# Patient Record
Sex: FEMALE | Race: WHITE | NOT HISPANIC OR LATINO | Employment: OTHER | URBAN - METROPOLITAN AREA
[De-identification: names, ages, dates, MRNs, and addresses within clinical notes are randomized per-mention and may not be internally consistent; named-entity substitution may affect disease eponyms.]

---

## 2018-04-21 ENCOUNTER — OFFICE VISIT (OUTPATIENT)
Dept: URGENT CARE | Facility: URGENT CARE | Age: 68
End: 2018-04-21
Payer: COMMERCIAL

## 2018-04-21 VITALS
TEMPERATURE: 97.7 F | BODY MASS INDEX: 34.95 KG/M2 | OXYGEN SATURATION: 96 % | DIASTOLIC BLOOD PRESSURE: 82 MMHG | WEIGHT: 210 LBS | SYSTOLIC BLOOD PRESSURE: 144 MMHG | HEART RATE: 69 BPM

## 2018-04-21 DIAGNOSIS — L03.314 CELLULITIS OF GROIN: Primary | ICD-10-CM

## 2018-04-21 DIAGNOSIS — N76.4 VULVAR ABSCESS: ICD-10-CM

## 2018-04-21 PROCEDURE — 56405 I&D VULVA/PERINEAL ABSCESS: CPT | Performed by: FAMILY MEDICINE

## 2018-04-21 PROCEDURE — 99202 OFFICE O/P NEW SF 15 MIN: CPT | Mod: 25 | Performed by: FAMILY MEDICINE

## 2018-04-21 RX ORDER — SULFAMETHOXAZOLE/TRIMETHOPRIM 800-160 MG
1 TABLET ORAL 2 TIMES DAILY
Qty: 20 TABLET | Refills: 0 | Status: SHIPPED | OUTPATIENT
Start: 2018-04-21 | End: 2021-02-12

## 2018-04-21 NOTE — PROGRESS NOTES
SUBJECTIVE:   Ely Pollack is a 68 year old female presenting with a chief complaint of   Chief Complaint   Patient presents with     Urgent Care     Derm Problem     x 3 days and has getting bigger.        She is a new patient of [facility].    Fam hx is negative for cardiovascular disease, but positive for arthritis    Review of Systems   Skin:        Complains of left vulvar swelling and erythema.  Symptoms over the last week increasing in discomfort and signs   All other systems reviewed and are negative.      Past Medical History:   Diagnosis Date     Arthritis      Hypertension     Cardiology No     Other chronic pain     Pain for 10 years     PONV (postoperative nausea and vomiting)      No family history on file.  Current Outpatient Prescriptions   Medication Sig Dispense Refill     sulfamethoxazole-trimethoprim (BACTRIM DS/SEPTRA DS) 800-160 MG per tablet Take 1 tablet by mouth 2 times daily 20 tablet 0     acetaminophen 650 MG 8 hour tablet Take 650 mg by mouth every 4 hours as needed for other (surgical pain) 100 tablet 0     aspirin  MG tablet Take 1 tablet (325 mg) by mouth daily with food 35 tablet 0     ATENOLOL PO Take 50 mg by mouth daily       CITALOPRAM HYDROBROMIDE PO Take 40 mg by mouth daily       enoxaparin (LOVENOX) 40 MG/0.4ML syringe Inject 0.4 mLs (40 mg) Subcutaneous every 24 hours 12 Syringe 0     hydrochlorothiazide 12.5 MG TABS Take 25 mg by mouth daily       IBUPROFEN PO Take 800 mg by mouth every 6 hours as needed for moderate pain       loratadine (CLARITIN) 10 MG tablet Take 10 mg by mouth daily       multivitamin, therapeutic with minerals (THERA-VIT-M) TABS Take 1 tablet by mouth daily       ondansetron (ZOFRAN-ODT) 4 MG disintegrating tablet Take 1 tablet (4 mg) by mouth every 6 hours as needed for nausea 14 tablet 0     oxyCODONE (ROXICODONE) 5 MG immediate release tablet Take 1-2 tablets (5-10 mg) by mouth every 3 hours as needed for moderate to severe pain 90 tablet 0      potassium chloride jeevan er (K-DUR) Take 10 mEq by mouth daily       senna-docusate (SENOKOT-S;PERICOLACE) 8.6-50 MG per tablet Take 1-2 tablets by mouth 2 times daily 30 tablet 0     Social History   Substance Use Topics     Smoking status: Never Smoker     Smokeless tobacco: Never Used     Alcohol use Yes      Comment: 3 drinks weekly       OBJECTIVE  /82 (BP Location: Right arm, Patient Position: Chair, Cuff Size: Adult Large)  Pulse 69  Temp 97.7  F (36.5  C) (Oral)  Wt 210 lb (95.3 kg)  SpO2 96%  Breastfeeding? No  BMI 34.95 kg/m2    Physical Exam   Constitutional: She appears well-developed.   HENT:   Head: Normocephalic.   Eyes: EOM are normal. Pupils are equal, round, and reactive to light.   Neck: Normal range of motion.   Cardiovascular: Normal rate and regular rhythm.    Pulmonary/Chest: Effort normal.   Abdominal: Soft.   Genitourinary:   Genitourinary Comments: External genitalia appears to be abnormal swelling along the external labia on the left.  There is significant tenderness and swelling swelling that encompasses the upper to the lower labia.    More towards the inner aspect of the external labia there appears to be a gland is not draining.   Musculoskeletal: Normal range of motion.   Neurological: She is alert.   Skin: She is not diaphoretic.       Labs:  No results found for this or any previous visit (from the past 24 hour(s)).    X-Ray was not done.    ASSESSMENT:      ICD-10-CM    1. Cellulitis of groin L03.314 sulfamethoxazole-trimethoprim (BACTRIM DS/SEPTRA DS) 800-160 MG per tablet   2. Vulvar abscess N76.4 sulfamethoxazole-trimethoprim (BACTRIM DS/SEPTRA DS) 800-160 MG per tablet    3. Erythema    She is taking the procedure room there she was placed in lithotomy position and the area was cleaned prepped and draped.    It was anesthetized with lidocaine without epinephrine.  We then entered the area using a #11 blade and were able to move significant amount of pustular  material.    No packing was placed in the wound              Medical Decision Making:    Differential Diagnosis:  Bartholin gland infection, cellulitis, vulvar infection.    Serious Comorbid Conditions:  Adult:  None    PLAN:    1. Antibiotics  2. Sitz baths  3. Call in am with update.    Followup:    She comes in because of the infection and the fact that she will be going on a trip within the short period of time.    I would have this rechecked in the next few days.    There are no Patient Instructions on file for this visit.

## 2018-04-21 NOTE — NURSING NOTE
"Chief Complaint   Patient presents with     Urgent Care     Derm Problem     x 3 days and has getting bigger.        Initial /82 (BP Location: Right arm, Patient Position: Chair, Cuff Size: Adult Large)  Pulse 69  Temp 97.7  F (36.5  C) (Oral)  Wt 210 lb (95.3 kg)  SpO2 96%  Breastfeeding? No  BMI 34.95 kg/m2 Estimated body mass index is 34.95 kg/(m^2) as calculated from the following:    Height as of 3/29/16: 5' 5\" (1.651 m).    Weight as of this encounter: 210 lb (95.3 kg).  Medication Reconciliation: complete     Deepika Grissom MA   "

## 2018-04-21 NOTE — MR AVS SNAPSHOT
"              After Visit Summary   2018    Ely Pollack    MRN: [id number]           Patient Information     Date Of Birth          1950        Visit Information        Provider Department      2018 11:25 AM Simon Shaw MD [facility]        Today's Diagnoses     Cellulitis of groin    -  1    Vulvar abscess           Follow-ups after your visit        Who to contact     If you have questions or need follow up information about today's clinic visit or your schedule please contact [facility] directly at [phone].  Normal or non-critical lab and imaging results will be communicated to you by Definienshart, letter or phone within 4 business days after the clinic has received the results. If you do not hear from us within 7 days, please contact the clinic through BladeLogict or phone. If you have a critical or abnormal lab result, we will notify you by phone as soon as possible.  Submit refill requests through [email or web address] or call your pharmacy and they will forward the refill request to us. Please allow 3 business days for your refill to be completed.          Additional Information About Your Visit        MyChart Information     [email or web address] lets you send messages to your doctor, view your test results, renew your prescriptions, schedule appointments and more. To sign up, go to [email or web address] . Click on \"Log in\" on the left side of the screen, which will take you to the Welcome page. Then click on \"Sign up Now\" on the right side of the page.     You will be asked to enter the access code listed below, as well as some personal information. Please follow the directions to create your username and password.     Your access code is: [email or web address]  Expires: 2018  3:02 PM     Your access code will  in 90 days. If you need help or a new code, please call your [facility] clinic or [phone].        Care EveryWhere ID     This is your Care EveryWhere ID. This " could be used by other organizations to access your [facility] medical records  [id number]        Your Vitals Were     Pulse Temperature Pulse Oximetry Breastfeeding? BMI (Body Mass Index)       69 97.7  F (36.5  C) (Oral) 96% No 34.95 kg/m2        Blood Pressure from Last 3 Encounters:   04/21/18 144/82   04/01/16 101/61    Weight from Last 3 Encounters:   04/21/18 210 lb (95.3 kg)   03/29/16 201 lb (91.2 kg)              We Performed the Following     DRAIN SKIN ABSCESS SIMPLE/SINGLE          Today's Medication Changes          These changes are accurate as of 4/21/18 11:59 PM.  If you have any questions, ask your nurse or doctor.               Start taking these medicines.        Dose/Directions    sulfamethoxazole-trimethoprim 800-160 MG per tablet   Commonly known as:  BACTRIM DS/SEPTRA DS   Used for:  Cellulitis of groin, Vulvar abscess   Started by:  Simon Shaw MD        Dose:  1 tablet   Take 1 tablet by mouth 2 times daily   Quantity:  20 tablet   Refills:  0            Where to get your medicines      These medications were sent to [id number] - [place], [place] - [address] [address]   [address] [facility] [address]     Phone:  [phone]     sulfamethoxazole-trimethoprim 800-160 MG per tablet                Primary Care Provider Office Phone # Fax #    Rakesh Ortiz [phone]       [facility] [id number] [place] [id number]        Equal Access to Services     RONALD GANDARA AH: [facility], adiel ch, qaybta kaalradha michaels alan rodriguez [facility] vaughn. So [facility] [phone].    ATENCIÓN: Si habla español, tiene a zaidi disposición servicios gratuitos de asistencia lingüística. Llame al [phone].    We comply with applicable federal civil rights laws and [place] laws. We do not discriminate on the basis of race, color, national origin, age, disability, sex, sexual orientation, or gender  identity.            Thank you!     Thank you for choosing [facility]  for your care. Our goal is always to provide you with excellent care. Hearing back from our patients is one way we can continue to improve our services. Please take a few minutes to complete the written survey that you may receive in the mail after your visit with us. Thank you!             Your Updated Medication List - Protect others around you: Learn how to safely use, store and throw away your medicines at www.disposemymeds.org.          This list is accurate as of 4/21/18 11:59 PM.  Always use your most recent med list.                   Brand Name Dispense Instructions for use Diagnosis    acetaminophen 650 MG 8 hour tablet     100 tablet    Take 650 mg by mouth every 4 hours as needed for other (surgical pain)    History of bilateral knee arthroplasty       aspirin 325 MG EC tablet     35 tablet    Take 1 tablet (325 mg) by mouth daily with food    History of bilateral knee arthroplasty       ATENOLOL PO      Take 50 mg by mouth daily        CITALOPRAM HYDROBROMIDE PO      Take 40 mg by mouth daily        enoxaparin 40 MG/0.4ML injection    LOVENOX    12 Syringe    Inject 0.4 mLs (40 mg) Subcutaneous every 24 hours    History of bilateral knee arthroplasty       hydrochlorothiazide 12.5 MG Tabs tablet      Take 25 mg by mouth daily        IBUPROFEN PO      Take 800 mg by mouth every 6 hours as needed for moderate pain        loratadine 10 MG tablet    CLARITIN     Take 10 mg by mouth daily        multivitamin, therapeutic with minerals Tabs tablet      Take 1 tablet by mouth daily        ondansetron 4 MG ODT tab    ZOFRAN-ODT    14 tablet    Take 1 tablet (4 mg) by mouth every 6 hours as needed for nausea    History of bilateral knee arthroplasty       oxyCODONE IR 5 MG tablet    ROXICODONE    90 tablet    Take 1-2 tablets (5-10 mg) by mouth every 3 hours as needed for moderate to severe pain    History of bilateral  knee arthroplasty       potassium chloride jeevan er    K-DUR     Take 10 mEq by mouth daily        senna-docusate 8.6-50 MG per tablet    SENOKOT-S;PERICOLACE    30 tablet    Take 1-2 tablets by mouth 2 times daily    History of bilateral knee arthroplasty       sulfamethoxazole-trimethoprim 800-160 MG per tablet    BACTRIM DS/SEPTRA DS    20 tablet    Take 1 tablet by mouth 2 times daily    Cellulitis of groin, Vulvar abscess

## 2019-12-06 ENCOUNTER — HOSPITAL ENCOUNTER (OUTPATIENT)
Dept: MAMMOGRAPHY | Facility: CLINIC | Age: 69
Discharge: HOME | End: 2019-12-06
Attending: FAMILY MEDICINE | Admitting: FAMILY MEDICINE
Payer: COMMERCIAL

## 2019-12-06 DIAGNOSIS — Z12.31 VISIT FOR SCREENING MAMMOGRAM: ICD-10-CM

## 2019-12-06 PROCEDURE — 77063 BREAST TOMOSYNTHESIS BI: CPT

## 2021-01-25 ENCOUNTER — TRANSFERRED RECORDS (OUTPATIENT)
Dept: FAMILY MEDICINE | Facility: CLINIC | Age: 71
End: 2021-01-25

## 2021-02-12 ENCOUNTER — OFFICE VISIT (OUTPATIENT)
Dept: FAMILY MEDICINE | Facility: CLINIC | Age: 71
End: 2021-02-12

## 2021-02-12 VITALS
HEIGHT: 66 IN | SYSTOLIC BLOOD PRESSURE: 136 MMHG | DIASTOLIC BLOOD PRESSURE: 72 MMHG | BODY MASS INDEX: 32.98 KG/M2 | HEART RATE: 64 BPM | WEIGHT: 205.2 LBS | RESPIRATION RATE: 20 BRPM | TEMPERATURE: 97.8 F

## 2021-02-12 DIAGNOSIS — Z12.11 SCREEN FOR COLON CANCER: ICD-10-CM

## 2021-02-12 DIAGNOSIS — M54.2 CHRONIC NECK PAIN: ICD-10-CM

## 2021-02-12 DIAGNOSIS — Z91.030 BEE STING ALLERGY: ICD-10-CM

## 2021-02-12 DIAGNOSIS — Z00.00 ROUTINE GENERAL MEDICAL EXAMINATION AT A HEALTH CARE FACILITY: Primary | ICD-10-CM

## 2021-02-12 DIAGNOSIS — R25.2 LEG CRAMPS: ICD-10-CM

## 2021-02-12 DIAGNOSIS — Z78.0 ASYMPTOMATIC POSTMENOPAUSAL STATUS: ICD-10-CM

## 2021-02-12 DIAGNOSIS — I10 ESSENTIAL HYPERTENSION: ICD-10-CM

## 2021-02-12 DIAGNOSIS — G89.29 CHRONIC BACK PAIN, UNSPECIFIED BACK LOCATION, UNSPECIFIED BACK PAIN LATERALITY: ICD-10-CM

## 2021-02-12 DIAGNOSIS — G89.29 CHRONIC NECK PAIN: ICD-10-CM

## 2021-02-12 DIAGNOSIS — F32.89 OTHER DEPRESSION: ICD-10-CM

## 2021-02-12 DIAGNOSIS — M54.9 CHRONIC BACK PAIN, UNSPECIFIED BACK LOCATION, UNSPECIFIED BACK PAIN LATERALITY: ICD-10-CM

## 2021-02-12 DIAGNOSIS — F41.9 ANXIETY: ICD-10-CM

## 2021-02-12 DIAGNOSIS — Z11.59 ENCOUNTER FOR HEPATITIS C SCREENING TEST FOR LOW RISK PATIENT: ICD-10-CM

## 2021-02-12 DIAGNOSIS — Z12.31 VISIT FOR SCREENING MAMMOGRAM: ICD-10-CM

## 2021-02-12 LAB
BUN SERPL-MCNC: 16 MG/DL (ref 7–25)
BUN/CREATININE RATIO: 21.3 (ref 6–22)
CALCIUM SERPL-MCNC: 9.8 MG/DL (ref 8.6–10.3)
CHLORIDE SERPLBLD-SCNC: 96.7 MMOL/L (ref 98–110)
CHOLEST SERPL-MCNC: 180 MG/DL (ref 0–199)
CHOLEST/HDLC SERPL: 3 {RATIO} (ref 0–5)
CO2 SERPL-SCNC: 31.8 MMOL/L (ref 20–32)
CREAT SERPL-MCNC: 0.75 MG/DL (ref 0.6–1.3)
GLUCOSE SERPL-MCNC: 108 MG/DL (ref 60–99)
HDLC SERPL-MCNC: 61 MG/DL (ref 40–150)
LDLC SERPL CALC-MCNC: 94 MG/DL (ref 0–130)
POTASSIUM SERPL-SCNC: 4.74 MMOL/L (ref 3.5–5.3)
SODIUM SERPL-SCNC: 135.9 MMOL/L (ref 135–146)
TRIGL SERPL-MCNC: 125 MG/DL (ref 0–149)

## 2021-02-12 PROCEDURE — 99387 INIT PM E/M NEW PAT 65+ YRS: CPT | Performed by: FAMILY MEDICINE

## 2021-02-12 PROCEDURE — 80061 LIPID PANEL: CPT | Performed by: FAMILY MEDICINE

## 2021-02-12 PROCEDURE — 36415 COLL VENOUS BLD VENIPUNCTURE: CPT | Performed by: FAMILY MEDICINE

## 2021-02-12 PROCEDURE — 80048 BASIC METABOLIC PNL TOTAL CA: CPT | Performed by: FAMILY MEDICINE

## 2021-02-12 RX ORDER — BUSPIRONE HYDROCHLORIDE 15 MG/1
7.5 TABLET ORAL 2 TIMES DAILY
Qty: 90 TABLET | Refills: 1 | Status: SHIPPED | OUTPATIENT
Start: 2021-02-12 | End: 2021-10-13

## 2021-02-12 RX ORDER — ATENOLOL 50 MG/1
50 TABLET ORAL DAILY
Qty: 90 TABLET | Refills: 1 | Status: SHIPPED | OUTPATIENT
Start: 2021-02-12 | End: 2021-10-13

## 2021-02-12 RX ORDER — BUSPIRONE HYDROCHLORIDE 15 MG/1
0.5 TABLET ORAL 2 TIMES DAILY
COMMUNITY
Start: 2021-01-27 | End: 2021-02-12

## 2021-02-12 RX ORDER — POTASSIUM CHLORIDE 1500 MG/1
20 TABLET, EXTENDED RELEASE ORAL DAILY
COMMUNITY
Start: 2020-12-06 | End: 2021-02-12

## 2021-02-12 RX ORDER — CITALOPRAM HYDROBROMIDE 40 MG/1
40 TABLET ORAL DAILY
COMMUNITY
Start: 2020-11-23 | End: 2021-02-12

## 2021-02-12 RX ORDER — CITALOPRAM HYDROBROMIDE 40 MG/1
40 TABLET ORAL DAILY
Qty: 90 TABLET | Refills: 1 | Status: SHIPPED | OUTPATIENT
Start: 2021-02-12 | End: 2021-10-13

## 2021-02-12 RX ORDER — EPINEPHRINE 0.3 MG/.3ML
3 INJECTION SUBCUTANEOUS
COMMUNITY
Start: 2021-02-05 | End: 2021-02-12

## 2021-02-12 RX ORDER — HYDROCHLOROTHIAZIDE 25 MG/1
25 TABLET ORAL DAILY
COMMUNITY
Start: 2021-01-27 | End: 2021-02-12

## 2021-02-12 RX ORDER — POTASSIUM CHLORIDE 1500 MG/1
20 TABLET, EXTENDED RELEASE ORAL DAILY
Qty: 90 TABLET | Refills: 1 | Status: SHIPPED | OUTPATIENT
Start: 2021-02-12 | End: 2021-10-13

## 2021-02-12 RX ORDER — HYDROCHLOROTHIAZIDE 25 MG/1
25 TABLET ORAL DAILY
Qty: 90 TABLET | Refills: 1 | Status: SHIPPED | OUTPATIENT
Start: 2021-02-12 | End: 2021-10-13

## 2021-02-12 RX ORDER — ATENOLOL 50 MG/1
50 TABLET ORAL DAILY
COMMUNITY
Start: 2019-09-24 | End: 2021-02-12

## 2021-02-12 RX ORDER — EPINEPHRINE 0.3 MG/.3ML
0.3 INJECTION SUBCUTANEOUS PRN
COMMUNITY
Start: 2021-02-12 | End: 2024-04-30

## 2021-02-12 SDOH — HEALTH STABILITY: MENTAL HEALTH: HOW OFTEN DO YOU HAVE A DRINK CONTAINING ALCOHOL?: 2-3 TIMES A WEEK

## 2021-02-12 SDOH — HEALTH STABILITY: MENTAL HEALTH: HOW MANY STANDARD DRINKS CONTAINING ALCOHOL DO YOU HAVE ON A TYPICAL DAY?: 1 OR 2

## 2021-02-12 SDOH — HEALTH STABILITY: MENTAL HEALTH: HOW OFTEN DO YOU HAVE 6 OR MORE DRINKS ON ONE OCCASION?: NEVER

## 2021-02-12 ASSESSMENT — ANXIETY QUESTIONNAIRES
6. BECOMING EASILY ANNOYED OR IRRITABLE: SEVERAL DAYS
7. FEELING AFRAID AS IF SOMETHING AWFUL MIGHT HAPPEN: NOT AT ALL
5. BEING SO RESTLESS THAT IT IS HARD TO SIT STILL: NOT AT ALL
1. FEELING NERVOUS, ANXIOUS, OR ON EDGE: SEVERAL DAYS
3. WORRYING TOO MUCH ABOUT DIFFERENT THINGS: SEVERAL DAYS
GAD7 TOTAL SCORE: 3
2. NOT BEING ABLE TO STOP OR CONTROL WORRYING: NOT AT ALL
IF YOU CHECKED OFF ANY PROBLEMS ON THIS QUESTIONNAIRE, HOW DIFFICULT HAVE THESE PROBLEMS MADE IT FOR YOU TO DO YOUR WORK, TAKE CARE OF THINGS AT HOME, OR GET ALONG WITH OTHER PEOPLE: NOT DIFFICULT AT ALL

## 2021-02-12 ASSESSMENT — PATIENT HEALTH QUESTIONNAIRE - PHQ9
SUM OF ALL RESPONSES TO PHQ QUESTIONS 1-9: 2
5. POOR APPETITE OR OVEREATING: NOT AT ALL

## 2021-02-12 ASSESSMENT — MIFFLIN-ST. JEOR: SCORE: 1469.59

## 2021-02-12 NOTE — PATIENT INSTRUCTIONS
Preventive Health Recommendations    See your health care provider every year to    Review health changes.     Discuss preventive care.      Review your medicines if your doctor has prescribed any.      You no longer need a yearly Pap test unless you've had an abnormal Pap test in the past 10 years. If you have vaginal symptoms, such as bleeding or discharge, be sure to talk with your provider about a Pap test.      Every 1 to 2 years, have a mammogram.  If you are over 69, talk with your health care provider about whether or not you want to continue having screening mammograms.      Every 10 years, have a colonoscopy. Or, have a yearly FIT test (stool test). These exams will check for colon cancer.       Have a cholesterol test every 5 years, or more often if your doctor advises it.       Have a diabetes test (fasting glucose) every three years. If you are at risk for diabetes, you should have this test more often.       At age 65, have a bone density scan (DEXA) to check for osteoporosis (brittle bone disease).    Shots:    Get a flu shot each year.    Get a tetanus shot every 10 years.    Talk to your doctor about your pneumonia vaccines. There are now two you should receive - Pneumovax (PPSV 23) and Prevnar (PCV 13).    Talk to your pharmacist about the shingles vaccine.    Talk to your doctor about the hepatitis B vaccine.    Nutrition:     Eat at least 5 servings of fruits and vegetables each day.      Eat whole-grain bread, whole-wheat pasta and brown rice instead of white grains and rice.      Get adequate about Calcium and Vitamin D.     Lifestyle    Exercise at least 150 minutes a week (30 minutes a day, 5 days a week). This will help you control your weight and prevent disease.      Limit alcohol to one drink per day.      No smoking.       Wear sunscreen to prevent skin cancer.       See your dentist twice a year for an exam and cleaning.      See your eye doctor every 1 to 2 years to screen for  conditions such as glaucoma, macular degeneration, cataracts, etc.    Personalized Prevention Plan  You are due for the preventive services outlined below.  Your care team is available to assist you in scheduling these services.  If you have already completed any of these items, please share that information with your care team to update in your medical record.    Health Maintenance Due   Topic Date Due     Osteoporosis Screening  1950     Discuss Advance Care Planning  1950     Colorectal Cancer Screening  04/13/1962     Hepatitis C Screening  04/13/1970     Diptheria Tetanus Pertussis (DTAP/TDAP/TD) Vaccine (1 - Tdap) 04/13/1977     Cholesterol Lab  04/13/1997     Annual Wellness Visit  04/13/2017     FALL RISK ASSESSMENT  04/13/2017     Pneumococcal Vaccine (1 of 1 - PPSV23) 04/13/2017     PHQ-2  01/01/2021     ASSESSMENT/PLAN:   1. Routine general medical examination at a health care facility    - Lipid Panel (BFP)    2. Essential hypertension  Blood pressure is controlled, continue medications. Refilled. checklabs.  - VENOUS COLLECTION  - Basic Metabolic Panel (BFP)  - atenolol (TENORMIN) 50 MG tablet; Take 1 tablet (50 mg) by mouth daily  Dispense: 90 tablet; Refill: 1  - hydrochlorothiazide (HYDRODIURIL) 25 MG tablet; Take 1 tablet (25 mg) by mouth daily  Dispense: 90 tablet; Refill: 1  - KLOR-CON 20 MEQ CR tablet; Take 1 tablet (20 mEq) by mouth daily  Dispense: 90 tablet; Refill: 1    3. Chronic back pain, unspecified back location, unspecified back pain laterality  Sees ortho.    4. Bee sting allergy  epipen.    5. Other depression  Controlled on medications, refilled.  - busPIRone (BUSPAR) 15 MG tablet; Take 0.5 tablets (7.5 mg) by mouth 2 times daily  Dispense: 90 tablet; Refill: 1  - citalopram (CELEXA) 40 MG tablet; Take 1 tablet (40 mg) by mouth daily  Dispense: 90 tablet; Refill: 1    6. Anxiety  Controlled on medications, refilled.  - busPIRone (BUSPAR) 15 MG tablet; Take 0.5 tablets  "(7.5 mg) by mouth 2 times daily  Dispense: 90 tablet; Refill: 1  - citalopram (CELEXA) 40 MG tablet; Take 1 tablet (40 mg) by mouth daily  Dispense: 90 tablet; Refill: 1    7. Leg cramps  Taking mg. Check labs.  - Magnesium (QUEST)    8. Chronic neck pain  Seeing ortho.    9. Screen for colon cancer    - COLOGNEVIN(EXACT SCIENCES)    Patient has been advised of split billing requirements and indicates understanding: Yes  COUNSELING:   Reviewed preventive health counseling, as reflected in patient instructions       Regular exercise       Healthy diet/nutrition    Estimated body mass index is 33.63 kg/m  as calculated from the following:    Height as of this encounter: 1.664 m (5' 5.5\").    Weight as of this encounter: 93.1 kg (205 lb 3.2 oz).    Weight management plan: Discussed healthy diet and exercise guidelines    She reports that she has never smoked. She has never used smokeless tobacco.    Recommended tetanus shot.  dexa scan.      Noemi Kirkpatrick MD  [facility]    "

## 2021-02-12 NOTE — PROGRESS NOTES
SUBJECTIVE:   CC: Ely Pollack is an 70 year old woman who presents for preventive health visit.       Patient has been advised of split billing requirements and indicates understanding: Yes  Healthy Habits:    Do you get at least three servings of calcium containing foods daily (dairy, green leafy vegetables, etc.)? yes    Amount of exercise or daily activities, outside of work: water aerobics, walking, bike    Problems taking medications regularly No    Medication side effects: No    Have you had an eye exam in the past two years? No, will go in soon    Do you see a dentist twice per year? yes    Do you have sleep apnea, excessive snoring or daytime drowsiness?no  Today's PHQ-2 Score:   PHQ-2 ( 1999 [employer]) 2/12/2021   Q1: Little interest or pleasure in doing things 0   Q2: Feeling down, depressed or hopeless 0   PHQ-2 Score 0       Do you feel safe in your environment? Yes    Have you ever done Advance Care Planning? (For example, a Health Directive, POLST, or a discussion with a medical provider or your loved ones about your wishes): Yes, patient states has an Advance Care Planning document and will bring a copy to the clinic.    Social History     Tobacco Use     Smoking status: Never Smoker     Smokeless tobacco: Never Used   Substance Use Topics     Alcohol use: Yes     Frequency: 2-3 times a week     Drinks per session: 1 or 2     Binge frequency: Never     Comment: 3 drinks weekly     If you drink alcohol do you typically have >3 drinks per day or >7 drinks per week? No  1 glass wine 5x/week                   Reviewed orders with patient.  Reviewed health maintenance and updated orders accordingly - Yes  BP Readings from Last 3 Encounters:   02/12/21 136/72   04/21/18 144/82   04/01/16 101/61    Wt Readings from Last 3 Encounters:   02/12/21 93.1 kg (205 lb 3.2 oz)   04/21/18 95.3 kg (210 lb)   03/29/16 91.2 kg (201 lb)                  Patient Active Problem List   Diagnosis     History of bilateral  knee arthroplasty     Essential hypertension     Anxiety     Other depression     Chronic back pain, unspecified back location, unspecified back pain laterality     Bee sting allergy     Leg cramps     Chronic neck pain     Past Surgical History:   Procedure Laterality Date     ARTHROPLASTY KNEE BILATERAL Bilateral 3/29/2016    Procedure: ARTHROPLASTY KNEE BILATERAL;  Surgeon: Darryn Cortez MD;  Location: [facility]     AS DECOMPRESS SPINAL CORD,1 SEG      Thorasic     AS SPINAL FUSION,ANT,EA ADNL LEVEL      Thorasic     BUNIONECTOMY Left        Social History     Tobacco Use     Smoking status: Never Smoker     Smokeless tobacco: Never Used   Substance Use Topics     Alcohol use: Yes     Frequency: 2-3 times a week     Drinks per session: 1 or 2     Binge frequency: Never     Comment: 3 drinks weekly     No family history on file.      Current Outpatient Medications   Medication Sig Dispense Refill     Albuterol Sulfate (VENTOLIN HFA IN) Inhale 2 puffs into the lungs every 4 hours as needed       aspirin (ASA) 81 MG chewable tablet 2 tablets daily       atenolol (TENORMIN) 50 MG tablet Take 1 tablet (50 mg) by mouth daily 90 tablet 1     busPIRone (BUSPAR) 15 MG tablet Take 0.5 tablets (7.5 mg) by mouth 2 times daily 90 tablet 1     citalopram (CELEXA) 40 MG tablet Take 1 tablet (40 mg) by mouth daily 90 tablet 1     EPINEPHrine (ANY BX GENERIC EQUIV) 0.3 MG/0.3ML injection 2-pack Inject 0.3 mLs (0.3 mg) into the muscle as needed for anaphylaxis       hydrochlorothiazide (HYDRODIURIL) 25 MG tablet Take 1 tablet (25 mg) by mouth daily 90 tablet 1     KLOR-CON 20 MEQ CR tablet Take 1 tablet (20 mEq) by mouth daily 90 tablet 1     MAGNESIUM GLUCONATE  mg daily       potassium chloride jeevan er (K-DUR) Take 10 mEq by mouth daily         Breast CA Risk Screening:    Pertinent mammograms are reviewed under the imaging tab.    Pertinent mammograms are reviewed under the imaging tab.  History of abnormal Pap smear: NO  "- age 67 - no recent vaginal bleeding.     Reviewed and updated as needed this visit by clinical staff  Tobacco  Allergies               Reviewed and updated as needed this visit by Provider                Past Medical History:   Diagnosis Date     Arthritis      Hypertension     Cardiology No     Other chronic pain     Pain for 10 years     PONV (postoperative nausea and vomiting)       Past Surgical History:   Procedure Laterality Date     ARTHROPLASTY KNEE BILATERAL Bilateral 3/29/2016    Procedure: ARTHROPLASTY KNEE BILATERAL;  Surgeon: Darryn Cortez MD;  Location: [facility]     AS DECOMPRESS SPINAL CORD,1 SEG      Thorasic     AS SPINAL FUSION,ANT,EA ADNL LEVEL      Thorasic     BUNIONECTOMY Left        ROS:  CONSTITUTIONAL: NEGATIVE for fever, chills, change in weight  INTEGUMENTARY/SKIN: NEGATIVE for worrisome rashes, moles or lesions  EYES: NEGATIVE for vision changes or irritation  ENT: NEGATIVE for ear, mouth and throat problems  RESP: NEGATIVE for significant cough or SOB  BREAST: NEGATIVE for masses, tenderness or discharge  CV: NEGATIVE for chest pain, palpitations or peripheral edema  GI: NEGATIVE for nausea, abdominal pain, heartburn, or change in bowel habits  : NEGATIVE for unusual urinary or vaginal symptoms. No vaginal bleeding.  MUSCULOSKELETAL: NEGATIVE for significant arthralgias or myalgia  NEURO: NEGATIVE for weakness, dizziness or paresthesias  PSYCHIATRIC: NEGATIVE for changes in mood or affect  Except sleep problems, vision changes, back muscle, armnumbness.     OBJECTIVE:   /72 (BP Location: Left arm, Patient Position: Chair, Cuff Size: Adult Large)   Pulse 64   Temp 97.8  F (36.6  C)   Resp 20   Ht 1.664 m (5' 5.5\")   Wt 93.1 kg (205 lb 3.2 oz)   BMI 33.63 kg/m    EXAM:  GENERAL: healthy, alert and no distress  EYES: Eyes grossly normal to inspection, PERRL and conjunctivae and sclerae normal  HENT: ear canals and TM's normal, nose and mouth without ulcers or " lesions  NECK: no adenopathy, no asymmetry, masses, or scars and thyroid normal to palpation  RESP: lungs clear to auscultation - no rales, rhonchi or wheezes  BREAST: normal without masses, tenderness or nipple discharge and no palpable axillary masses or adenopathy  CV: regular rate and rhythm, normal S1 S2, no S3 or S4, no murmur, click or rub, no peripheral edema and peripheral pulses strong  ABDOMEN: soft, nontender, no hepatosplenomegaly, no masses and bowel sounds normal   (female): normal female external genitalia, normal urethral meatus, vaginal mucosa pink, moist, well rugated, and normal cervix/adnexa/uterus without masses or discharge  MS: no gross musculoskeletal defects noted, no edema  SKIN: no suspicious lesions or rashes  NEURO: Normal strength and tone, mentation intact and speech normal  PSYCH: mentation appears normal, affect normal/bright  LYMPH: no cervical, supraclavicular, axillary, or inguinal adenopathy    Diagnostic Test Results:  Labs reviewed in Epic  No results found for this or any previous visit (from the past 24 hour(s)).    ASSESSMENT/PLAN:   1. Routine general medical examination at a health care facility    - Lipid Panel (BFP)    2. Essential hypertension  Blood pressure is controlled, continue medications. Refilled. checklabs.  - VENOUS COLLECTION  - Basic Metabolic Panel (BFP)  - atenolol (TENORMIN) 50 MG tablet; Take 1 tablet (50 mg) by mouth daily  Dispense: 90 tablet; Refill: 1  - hydrochlorothiazide (HYDRODIURIL) 25 MG tablet; Take 1 tablet (25 mg) by mouth daily  Dispense: 90 tablet; Refill: 1  - KLOR-CON 20 MEQ CR tablet; Take 1 tablet (20 mEq) by mouth daily  Dispense: 90 tablet; Refill: 1    3. Chronic back pain, unspecified back location, unspecified back pain laterality  Sees ortho.    4. Bee sting allergy  epipen.    5. Other depression  Controlled on medications, refilled.  - busPIRone (BUSPAR) 15 MG tablet; Take 0.5 tablets (7.5 mg) by mouth 2 times daily   "Dispense: 90 tablet; Refill: 1  - citalopram (CELEXA) 40 MG tablet; Take 1 tablet (40 mg) by mouth daily  Dispense: 90 tablet; Refill: 1    6. Anxiety  Controlled on medications, refilled.  - busPIRone (BUSPAR) 15 MG tablet; Take 0.5 tablets (7.5 mg) by mouth 2 times daily  Dispense: 90 tablet; Refill: 1  - citalopram (CELEXA) 40 MG tablet; Take 1 tablet (40 mg) by mouth daily  Dispense: 90 tablet; Refill: 1    7. Leg cramps  Taking mg. Check labs.  - Magnesium (QUEST)    8. Chronic neck pain  Seeing ortho.    9. Screen for colon cancer    - CHUY[employer])    Patient has been advised of split billing requirements and indicates understanding: Yes  COUNSELING:   Reviewed preventive health counseling, as reflected in patient instructions       Regular exercise       Healthy diet/nutrition    Estimated body mass index is 33.63 kg/m  as calculated from the following:    Height as of this encounter: 1.664 m (5' 5.5\").    Weight as of this encounter: 93.1 kg (205 lb 3.2 oz).    Weight management plan: Discussed healthy diet and exercise guidelines    She reports that she has never smoked. She has never used smokeless tobacco.    Recommended tetanus shot.  dexa scan.      Noemi Kirkpatrick MD  [facility]  "

## 2021-02-13 ASSESSMENT — ANXIETY QUESTIONNAIRES: GAD7 TOTAL SCORE: 3

## 2021-02-15 LAB
HCV AB - QUEST: NORMAL
MAGNESIUM SERPL-MCNC: 1.9 MG/DL (ref 1.5–2.5)
SIGNAL TO CUT OFF - QUEST: 0.01

## 2021-02-23 ENCOUNTER — TRANSFERRED RECORDS (OUTPATIENT)
Dept: FAMILY MEDICINE | Facility: CLINIC | Age: 71
End: 2021-02-23

## 2021-02-23 LAB — COLOGUARD-ABSTRACT: NEGATIVE

## 2021-07-02 DIAGNOSIS — F32.89 OTHER DEPRESSION: ICD-10-CM

## 2021-07-02 DIAGNOSIS — I10 ESSENTIAL HYPERTENSION: ICD-10-CM

## 2021-07-02 DIAGNOSIS — F41.9 ANXIETY: ICD-10-CM

## 2021-07-02 RX ORDER — CITALOPRAM HYDROBROMIDE 40 MG/1
TABLET ORAL
Qty: 90 TABLET | Refills: 1 | COMMUNITY
Start: 2021-07-02

## 2021-07-02 RX ORDER — ATENOLOL 50 MG/1
TABLET ORAL
Qty: 90 TABLET | Refills: 1 | COMMUNITY
Start: 2021-07-02

## 2021-07-02 NOTE — TELEPHONE ENCOUNTER
Received incoming refill request for  Pending Prescriptions:                       Disp   Refills    atenolol (TENORMIN) 50 MG tablet [Pharmac*90 tab*1            Sig: TAKE 1 TABLET BY MOUTH EVERY DAY    citalopram (CELEXA) 40 MG tablet [Pharmac*90 tab*1            Sig: TAKE 1 TABLET BY MOUTH EVERY DAY    Patient last had a refill of these medications on 2/12/2021 for a 6 month supply so no refill should be needed until August.

## 2021-08-08 DIAGNOSIS — F41.9 ANXIETY: ICD-10-CM

## 2021-08-08 DIAGNOSIS — F32.89 OTHER DEPRESSION: ICD-10-CM

## 2021-08-08 DIAGNOSIS — I10 ESSENTIAL HYPERTENSION: ICD-10-CM

## 2021-08-09 RX ORDER — BUSPIRONE HYDROCHLORIDE 15 MG/1
TABLET ORAL
Qty: 90 TABLET | Refills: 1 | COMMUNITY
Start: 2021-08-09

## 2021-08-09 RX ORDER — POTASSIUM CHLORIDE 1500 MG/1
TABLET, EXTENDED RELEASE ORAL
Qty: 90 TABLET | Refills: 1 | COMMUNITY
Start: 2021-08-09

## 2021-08-09 NOTE — TELEPHONE ENCOUNTER
2/12/2021, pt is now due for 6 month OV.     Refused Prescriptions:                       Disp   Refills    busPIRone (BUSPAR) 15 MG tablet [Pharmacy *90 tab*1        Sig: TAKE 1/2 TABLET (7.5 MG) BY MOUTH 2 TIMES DAILY  Refused By: DALI TAY  Reason for Refusal: Patient needs appointment  Reason for Refusal Comment: should call if needs a short supply     KLOR-CON 20 MEQ CR tablet [Pharmacy Med Na*90 tab*1        Sig: TAKE 1 TABLET BY MOUTH DAILY  Refused By: DALI TAY  Reason for Refusal: Patient needs appointment

## 2021-09-04 ENCOUNTER — HEALTH MAINTENANCE LETTER (OUTPATIENT)
Age: 71
End: 2021-09-04

## 2021-10-11 NOTE — PROGRESS NOTES
"Assessment & Plan   Problem List Items Addressed This Visit        Circulatory    Essential hypertension    Relevant Medications    KLOR-CON 20 MEQ CR tablet    hydrochlorothiazide (HYDRODIURIL) 25 MG tablet    atenolol (TENORMIN) 50 MG tablet    Other Relevant Orders    Basic Metabolic Panel (BFP)    VENOUS COLLECTION (Completed)       Behavioral    Other depression    Relevant Medications    citalopram (CELEXA) 40 MG tablet    busPIRone (BUSPAR) 15 MG tablet       Other    Anxiety    Relevant Medications    citalopram (CELEXA) 40 MG tablet    busPIRone (BUSPAR) 15 MG tablet         1. Essential hypertension  Check labs, continue medications.  - KLOR-CON 20 MEQ CR tablet; Take 1 tablet (20 mEq) by mouth daily  Dispense: 90 tablet; Refill: 1  - hydrochlorothiazide (HYDRODIURIL) 25 MG tablet; Take 1 tablet (25 mg) by mouth daily  Dispense: 90 tablet; Refill: 1  - atenolol (TENORMIN) 50 MG tablet; Take 1 tablet (50 mg) by mouth daily  Dispense: 90 tablet; Refill: 1  - Basic Metabolic Panel (BFP)  - VENOUS COLLECTION    2. Other depression  Controlled, refilled.  - citalopram (CELEXA) 40 MG tablet; Take 1 tablet (40 mg) by mouth daily  Dispense: 90 tablet; Refill: 1  - busPIRone (BUSPAR) 15 MG tablet; Take 0.5 tablets (7.5 mg) by mouth 2 times daily  Dispense: 90 tablet; Refill: 1    3. Anxiety  Controlled.  - citalopram (CELEXA) 40 MG tablet; Take 1 tablet (40 mg) by mouth daily  Dispense: 90 tablet; Refill: 1  - busPIRone (BUSPAR) 15 MG tablet; Take 0.5 tablets (7.5 mg) by mouth 2 times daily  Dispense: 90 tablet; Refill: 1           BMI:   Estimated body mass index is 34.45 kg/m  as calculated from the following:    Height as of this encounter: 1.664 m (5' 5.5\").    Weight as of this encounter: 95.3 kg (210 lb 3.2 oz).         FUTURE APPOINTMENTS:       - Follow-up visit in 6 months    No follow-ups on file.    Noemi Kirkpatrick MD  [facility]    Subjective     Nursing Notes:   China Vasques CMA " " 10/13/2021 10:01 AM  Signed  Chief Complaint   Patient presents with     Recheck Medication     fasting today, refill medications     Pre-visit Screening:  Immunizations:  up to date  Colonoscopy:  is up to date  Mammogram: is up to date  Asthma Action Test/Plan:  NA  PHQ9:  Done today  GAD7:  DOne today  Questioned patient about current smoking habits Pt. has never smoked.  Ok to leave detailed message on voice mail for today's visit only Yes, phone # [phone]           Ely Pollack is a 71 year old female who presents to clinic today for the following health issues   HPI     Here to followup on her medications and do labs. Is doing well on medications for blood pressure, potassium, depression/anxiety. Would like refills    Review of Systems   Constitutional, HEENT, cardiovascular, pulmonary, gi and gu systems are negative, except as otherwise noted.  Except easy bruising, joint pain      Objective    /76 (BP Location: Right arm, Patient Position: Sitting, Cuff Size: Adult Large)   Pulse 59   Temp 97.2  F (36.2  C) (Temporal)   Ht 1.664 m (5' 5.5\")   Wt 95.3 kg (210 lb 3.2 oz)   SpO2 97%   BMI 34.45 kg/m    Body mass index is 34.45 kg/m .  Physical Exam   GENERAL: healthy, alert and no distress  RESP: lungs clear to auscultation - no rales, rhonchi or wheezes  CV: regular rate and rhythm, normal S1 S2, no S3 or S4, no murmur, click or rub, no peripheral edema and peripheral pulses strong  ABDOMEN: soft, nontender, no hepatosplenomegaly, no masses and bowel sounds normal  MS: no gross musculoskeletal defects noted, no edema  NEURO: Normal strength and tone, mentation intact and speech normal  PSYCH: mentation appears normal, affect normal/bright    No results found for any visits on 10/13/21.      "

## 2021-10-13 ENCOUNTER — OFFICE VISIT (OUTPATIENT)
Dept: FAMILY MEDICINE | Facility: CLINIC | Age: 71
End: 2021-10-13

## 2021-10-13 VITALS
WEIGHT: 210.2 LBS | TEMPERATURE: 97.2 F | BODY MASS INDEX: 33.78 KG/M2 | OXYGEN SATURATION: 97 % | SYSTOLIC BLOOD PRESSURE: 128 MMHG | DIASTOLIC BLOOD PRESSURE: 76 MMHG | HEIGHT: 66 IN | HEART RATE: 59 BPM

## 2021-10-13 DIAGNOSIS — F41.9 ANXIETY: ICD-10-CM

## 2021-10-13 DIAGNOSIS — I10 ESSENTIAL HYPERTENSION: ICD-10-CM

## 2021-10-13 DIAGNOSIS — F32.89 OTHER DEPRESSION: ICD-10-CM

## 2021-10-13 LAB
BUN SERPL-MCNC: 20 MG/DL (ref 7–25)
BUN/CREATININE RATIO: 27.4 (ref 6–22)
CALCIUM SERPL-MCNC: 9.3 MG/DL (ref 8.6–10.3)
CHLORIDE SERPLBLD-SCNC: 98.6 MMOL/L (ref 98–110)
CO2 SERPL-SCNC: 31.2 MMOL/L (ref 20–32)
CREAT SERPL-MCNC: 0.73 MG/DL (ref 0.6–1.3)
GLUCOSE SERPL-MCNC: 107 MG/DL (ref 60–99)
POTASSIUM SERPL-SCNC: 4.75 MMOL/L (ref 3.5–5.3)
SODIUM SERPL-SCNC: 136.7 MMOL/L (ref 135–146)

## 2021-10-13 PROCEDURE — 80048 BASIC METABOLIC PNL TOTAL CA: CPT | Performed by: FAMILY MEDICINE

## 2021-10-13 PROCEDURE — 99213 OFFICE O/P EST LOW 20 MIN: CPT | Performed by: FAMILY MEDICINE

## 2021-10-13 PROCEDURE — 36415 COLL VENOUS BLD VENIPUNCTURE: CPT | Performed by: FAMILY MEDICINE

## 2021-10-13 RX ORDER — BUSPIRONE HYDROCHLORIDE 15 MG/1
7.5 TABLET ORAL 2 TIMES DAILY
Qty: 90 TABLET | Refills: 1 | Status: SHIPPED | OUTPATIENT
Start: 2021-10-13 | End: 2022-04-05

## 2021-10-13 RX ORDER — ATENOLOL 50 MG/1
50 TABLET ORAL DAILY
Qty: 90 TABLET | Refills: 1 | Status: SHIPPED | OUTPATIENT
Start: 2021-10-13 | End: 2022-04-05

## 2021-10-13 RX ORDER — HYDROCHLOROTHIAZIDE 25 MG/1
25 TABLET ORAL DAILY
Qty: 90 TABLET | Refills: 1 | Status: SHIPPED | OUTPATIENT
Start: 2021-10-13 | End: 2022-04-05

## 2021-10-13 RX ORDER — CITALOPRAM HYDROBROMIDE 40 MG/1
40 TABLET ORAL DAILY
Qty: 90 TABLET | Refills: 1 | Status: SHIPPED | OUTPATIENT
Start: 2021-10-13 | End: 2022-04-05

## 2021-10-13 RX ORDER — POTASSIUM CHLORIDE 1500 MG/1
20 TABLET, EXTENDED RELEASE ORAL DAILY
Qty: 90 TABLET | Refills: 1 | Status: SHIPPED | OUTPATIENT
Start: 2021-10-13 | End: 2022-04-05

## 2021-10-13 ASSESSMENT — PATIENT HEALTH QUESTIONNAIRE - PHQ9
SUM OF ALL RESPONSES TO PHQ QUESTIONS 1-9: 1
5. POOR APPETITE OR OVEREATING: NOT AT ALL

## 2021-10-13 ASSESSMENT — ANXIETY QUESTIONNAIRES
IF YOU CHECKED OFF ANY PROBLEMS ON THIS QUESTIONNAIRE, HOW DIFFICULT HAVE THESE PROBLEMS MADE IT FOR YOU TO DO YOUR WORK, TAKE CARE OF THINGS AT HOME, OR GET ALONG WITH OTHER PEOPLE: NOT DIFFICULT AT ALL
2. NOT BEING ABLE TO STOP OR CONTROL WORRYING: SEVERAL DAYS
7. FEELING AFRAID AS IF SOMETHING AWFUL MIGHT HAPPEN: SEVERAL DAYS
3. WORRYING TOO MUCH ABOUT DIFFERENT THINGS: SEVERAL DAYS
5. BEING SO RESTLESS THAT IT IS HARD TO SIT STILL: NOT AT ALL
GAD7 TOTAL SCORE: 4
1. FEELING NERVOUS, ANXIOUS, OR ON EDGE: SEVERAL DAYS
6. BECOMING EASILY ANNOYED OR IRRITABLE: NOT AT ALL

## 2021-10-13 ASSESSMENT — MIFFLIN-ST. JEOR: SCORE: 1487.27

## 2021-10-13 NOTE — NURSING NOTE
Chief Complaint   Patient presents with     Recheck Medication     fasting today, refill medications     Pre-visit Screening:  Immunizations:  up to date  Colonoscopy:  is up to date  Mammogram: is up to date  Asthma Action Test/Plan:  NA  PHQ9:  Done today  GAD7:  DOne today  Questioned patient about current smoking habits Pt. has never smoked.  Ok to leave detailed message on voice mail for today's visit only Yes, phone # [phone]

## 2021-10-13 NOTE — PATIENT INSTRUCTIONS
"Assessment & Plan   Problem List Items Addressed This Visit        Circulatory    Essential hypertension    Relevant Medications    KLOR-CON 20 MEQ CR tablet    hydrochlorothiazide (HYDRODIURIL) 25 MG tablet    atenolol (TENORMIN) 50 MG tablet    Other Relevant Orders    Basic Metabolic Panel (BFP)    VENOUS COLLECTION (Completed)       Behavioral    Other depression    Relevant Medications    citalopram (CELEXA) 40 MG tablet    busPIRone (BUSPAR) 15 MG tablet       Other    Anxiety    Relevant Medications    citalopram (CELEXA) 40 MG tablet    busPIRone (BUSPAR) 15 MG tablet         1. Essential hypertension  Check labs, continue medications.  - KLOR-CON 20 MEQ CR tablet; Take 1 tablet (20 mEq) by mouth daily  Dispense: 90 tablet; Refill: 1  - hydrochlorothiazide (HYDRODIURIL) 25 MG tablet; Take 1 tablet (25 mg) by mouth daily  Dispense: 90 tablet; Refill: 1  - atenolol (TENORMIN) 50 MG tablet; Take 1 tablet (50 mg) by mouth daily  Dispense: 90 tablet; Refill: 1  - Basic Metabolic Panel (BFP)  - VENOUS COLLECTION    2. Other depression  Controlled, refilled.  - citalopram (CELEXA) 40 MG tablet; Take 1 tablet (40 mg) by mouth daily  Dispense: 90 tablet; Refill: 1  - busPIRone (BUSPAR) 15 MG tablet; Take 0.5 tablets (7.5 mg) by mouth 2 times daily  Dispense: 90 tablet; Refill: 1    3. Anxiety  Controlled.  - citalopram (CELEXA) 40 MG tablet; Take 1 tablet (40 mg) by mouth daily  Dispense: 90 tablet; Refill: 1  - busPIRone (BUSPAR) 15 MG tablet; Take 0.5 tablets (7.5 mg) by mouth 2 times daily  Dispense: 90 tablet; Refill: 1           BMI:   Estimated body mass index is 34.45 kg/m  as calculated from the following:    Height as of this encounter: 1.664 m (5' 5.5\").    Weight as of this encounter: 95.3 kg (210 lb 3.2 oz).         FUTURE APPOINTMENTS:       - Follow-up visit in 6 months    No follow-ups on file.    Noemi Kirkpatrick MD  [facility]  "

## 2021-10-14 ASSESSMENT — ANXIETY QUESTIONNAIRES: GAD7 TOTAL SCORE: 4

## 2021-12-10 ENCOUNTER — IMMUNIZATION (OUTPATIENT)
Dept: FAMILY MEDICINE | Facility: CLINIC | Age: 71
End: 2021-12-10
Payer: COMMERCIAL

## 2021-12-10 DIAGNOSIS — Z23 NEED FOR PROPHYLACTIC VACCINATION AND INOCULATION AGAINST INFLUENZA: Primary | ICD-10-CM

## 2021-12-10 PROCEDURE — 90662 IIV NO PRSV INCREASED AG IM: CPT

## 2021-12-10 PROCEDURE — G0008 ADMIN INFLUENZA VIRUS VAC: HCPCS

## 2022-02-01 DIAGNOSIS — J30.89 SEASONAL ALLERGIC RHINITIS DUE TO OTHER ALLERGIC TRIGGER: Primary | ICD-10-CM

## 2022-02-01 RX ORDER — ALBUTEROL SULFATE 90 UG/1
2 AEROSOL, METERED RESPIRATORY (INHALATION) EVERY 6 HOURS
Qty: 18 G | Refills: 1 | Status: SHIPPED | OUTPATIENT
Start: 2022-02-01 | End: 2022-04-05

## 2022-02-01 NOTE — TELEPHONE ENCOUNTER
Patient called in and is asking for a refill of  Pending Prescriptions:                       Disp   Refills    albuterol (PROAIR HFA/PROVENTIL HFA/LOU*18 g                Sig: Inhale 2 puffs into the lungs every 6 hours    Sent into the pharmacy. She is going to [place] for a month and uses her inhaler for allergies and they become severe when she is in [place]. She was getting it filled by Dr. Ortiz at her old clinic but the one that she has is  and the pharmacy needs a new prescription in order to fill it for her.

## 2022-03-07 ENCOUNTER — OFFICE VISIT (OUTPATIENT)
Dept: FAMILY MEDICINE | Facility: CLINIC | Age: 72
End: 2022-03-07

## 2022-03-07 ENCOUNTER — TELEPHONE (OUTPATIENT)
Dept: FAMILY MEDICINE | Facility: CLINIC | Age: 72
End: 2022-03-07

## 2022-03-07 VITALS
HEIGHT: 66 IN | DIASTOLIC BLOOD PRESSURE: 80 MMHG | OXYGEN SATURATION: 97 % | SYSTOLIC BLOOD PRESSURE: 126 MMHG | WEIGHT: 210.4 LBS | BODY MASS INDEX: 33.82 KG/M2 | HEART RATE: 65 BPM | TEMPERATURE: 97.7 F

## 2022-03-07 DIAGNOSIS — L02.212 ABSCESS OF BACK: Primary | ICD-10-CM

## 2022-03-07 PROBLEM — H52.223 REGULAR ASTIGMATISM, BILATERAL: Status: ACTIVE | Noted: 2019-11-26

## 2022-03-07 PROBLEM — C44.729 SQUAMOUS CELL CARCINOMA OF LOWER LEG, LEFT: Status: ACTIVE | Noted: 2017-12-06

## 2022-03-07 PROBLEM — H35.363: Status: ACTIVE | Noted: 2019-11-26

## 2022-03-07 PROBLEM — M54.12 CERVICAL RADICULOPATHY AT C7: Status: ACTIVE | Noted: 2020-12-13

## 2022-03-07 PROBLEM — Z85.828 HISTORY OF SQUAMOUS CELL CARCINOMA OF SKIN: Status: ACTIVE | Noted: 2017-11-02

## 2022-03-07 PROBLEM — F33.1 MAJOR DEPRESSIVE DISORDER, RECURRENT EPISODE, MODERATE (H): Status: ACTIVE | Noted: 2017-09-29

## 2022-03-07 PROBLEM — H52.4 PRESBYOPIA: Status: ACTIVE | Noted: 2019-11-26

## 2022-03-07 PROBLEM — H02.831 DERMATOCHALASIS OF BOTH UPPER EYELIDS: Status: ACTIVE | Noted: 2019-11-26

## 2022-03-07 PROBLEM — H25.13 NUCLEAR SCLEROTIC CATARACT, BILATERAL: Status: ACTIVE | Noted: 2021-05-04

## 2022-03-07 PROBLEM — H02.834 DERMATOCHALASIS OF BOTH UPPER EYELIDS: Status: ACTIVE | Noted: 2019-11-26

## 2022-03-07 PROBLEM — D31.31 CHOROIDAL NEVUS, RIGHT EYE: Status: ACTIVE | Noted: 2019-11-26

## 2022-03-07 PROCEDURE — 10060 I&D ABSCESS SIMPLE/SINGLE: CPT | Performed by: STUDENT IN AN ORGANIZED HEALTH CARE EDUCATION/TRAINING PROGRAM

## 2022-03-07 PROCEDURE — 99213 OFFICE O/P EST LOW 20 MIN: CPT | Mod: 25 | Performed by: STUDENT IN AN ORGANIZED HEALTH CARE EDUCATION/TRAINING PROGRAM

## 2022-03-07 NOTE — PROGRESS NOTES
"Assessment & Plan     1. Abscess of back  Loculated abscess, no systemic sx. Discussed tx options, pt elected to proceed with drainage. Successful I&D here today, patient to return in 3-4 days for reeval and packing exchange. Reaosns to follow-up sooner reviewed.   - DRAIN SKIN ABSCESS SIMPLE/SINGLE     Ramo Zamora MD, [facility]       Subjective     Ely Pollack is a 71 year old female who presents to clinic today for the following health issues:    HPI   Chief Complaint   Patient presents with     Derm Problem     boil like lump on upper back near right shoulder blade, red bump that is now coming to a \"head\", tender and rubs on clothing, painful to sleep on, slight tinglin on left shoulder, noticed a few weeks ag       Skin Lesion  Onset/Duration: last week noted painful bump left upper back. Seems to be more painful, possibly coming to a head now. No f/c, n/v. No hx of similar. No recent swimming or known exposures. Was in [place] but no international travel.         Objective    /80 (BP Location: Left arm, Patient Position: Sitting, Cuff Size: Adult Large)   Pulse 65   Temp 97.7  F (36.5  C) (Temporal)   Ht 1.664 m (5' 5.5\")   Wt 95.4 kg (210 lb 6.4 oz)   SpO2 97%   BMI 34.48 kg/m    Body mass index is 34.48 kg/m .  Physical Exam   Alert, NAD nontoxic  NC/AT  Sclerae anicteric  Regular  Resp nonlabored  Skin warm and dry, Indurated lesion over left scapula.  Size 4x6 cm. There is   surrounding erythema and tenderness.   No focal neuro deficits. Speech intact. Normal gait.  Appropriate affect    Bedside ultrasound demonstrates discrete hypoechoic fluid collection with loculations.             PROCEDURE:   After discussion of treatment options, patient elected to proceed with I&D.   Written informed consent was obtained, using alcohol for cleansing and 1% Lidocaine with epinephrine for anesthetic, with sterile technique, a 1.5 cm incision was made into the abscess " cavity which was then drained of purulent material. Blunt probe used to break up loculations.  The cavity was then packed with iodoform guaze. Procedure well tolerated.  Dressing applied and wound care instructions provided. Patient will return for packing exchange in 3-4 days, sooner with any concerns.

## 2022-03-07 NOTE — TELEPHONE ENCOUNTER
Ely R Anderson called the clinic support line with the following:    Has a few painful lumps the size of quarters on her back since coming back from [place].     Helped her make an OV

## 2022-03-07 NOTE — Clinical Note
I just entered simple abscess [place] code, but do the loculations and packing increase coding to complicated abscess?

## 2022-03-07 NOTE — NURSING NOTE
"Chief Complaint   Patient presents with     Derm Problem     boil like lump on upper back near right shoulder blade, red bump that is now coming to a \"head\", tender and rubs on clothing, painful to sleep on, slight tinglin on left shoulder, noticed a few weeks ag     Pre-visit Screening:  Immunizations:  up to date  Colonoscopy:  is up to date  Mammogram: is up to date  Asthma Action Test/Plan:  NA  PHQ9:  NA  GAD7:  NA  Questioned patient about current smoking habits Pt. has never smoked.  Ok to leave detailed message on voice mail for today's visit only Yes, phone # [phone]      "

## 2022-03-11 ENCOUNTER — OFFICE VISIT (OUTPATIENT)
Dept: FAMILY MEDICINE | Facility: CLINIC | Age: 72
End: 2022-03-11

## 2022-03-11 VITALS
OXYGEN SATURATION: 95 % | SYSTOLIC BLOOD PRESSURE: 140 MMHG | DIASTOLIC BLOOD PRESSURE: 80 MMHG | TEMPERATURE: 96.4 F | HEART RATE: 98 BPM

## 2022-03-11 DIAGNOSIS — Z48.01 ENCOUNTER FOR CHANGE OR REMOVAL OF SURGICAL WOUND DRESSING: Primary | ICD-10-CM

## 2022-03-11 NOTE — PROGRESS NOTES
"    Assessment & Plan     Encounter for change or removal of surgical wound dressing  Reminded she can shower with Tegaderm but to avoid scrubbing area    Use OTC Delsym and PRN albuterol for her cough, reminded to deep breathe to improve lungs, will re-evaluate Tuesday if this isn't improving. Informed this is likely due to her changing climate from returning from [place].    RTC Tuesday 3/15 for possible repacking and dressing change    No follow-ups on file.    Ben Jewell PA-C  [facility]    Subjective   Ely is a 71 year old who presents for the following health issues     HPI     Patient had I and D of her right upper back with Dr. Zamora on 3/7/22. She hasn't changed the bandage since visit. No fevers, chills, chest pain, SOB or other abnormal symptom. The bandage is intact. She does report some subjective wheezing and slight cough with she attributes to her asthma. She recently returned from [place] from a vacation.     Cleaned area with alcohol wipe. Removed Tegaderm, gauze covering and iodoform gauze. Repacked wound with approx 2\" iodoform gauze, covered with sterile gauze, and replaced Tegaderm dressing. Reminded to go to ER or call clinic if she experiences fevers/chills, night sweats, SOB or chest pain.    Review of Systems   CONSTITUTIONAL: NEGATIVE for fever, chills, change in weight  RESP:cough-productive and Hx asthma  CV: NEGATIVE for chest pain, palpitations or peripheral edema  GI: NEGATIVE for nausea, abdominal pain, heartburn, or change in bowel habits      Objective    BP (!) 140/80 (BP Location: Right arm, Patient Position: Sitting, Cuff Size: Adult Large)   Pulse 98   Temp (!) 96.4  F (35.8  C)   SpO2 95%   There is no height or weight on file to calculate BMI.  Physical Exam   GENERAL: healthy, alert and no distress  RESP: few crackles noted in R lower lung field, decreased with coughing. Patient has mild cough during exam  MS: no gross musculoskeletal defects noted, no " edema  Wound: mild erythema noted around incision site. No pus or bleeding noted. Bandage is intact with Tegaderm covering site.

## 2022-03-14 ENCOUNTER — OFFICE VISIT (OUTPATIENT)
Dept: FAMILY MEDICINE | Facility: CLINIC | Age: 72
End: 2022-03-14

## 2022-03-14 VITALS
HEART RATE: 59 BPM | HEIGHT: 66 IN | DIASTOLIC BLOOD PRESSURE: 77 MMHG | TEMPERATURE: 97.2 F | SYSTOLIC BLOOD PRESSURE: 143 MMHG | BODY MASS INDEX: 33.75 KG/M2 | OXYGEN SATURATION: 97 % | WEIGHT: 210 LBS

## 2022-03-14 DIAGNOSIS — Z48.01 ENCOUNTER FOR CHANGE OR REMOVAL OF SURGICAL WOUND DRESSING: Primary | ICD-10-CM

## 2022-03-14 NOTE — PROGRESS NOTES
"  Assessment & Plan     Encounter for change or removal of surgical wound dressing  Reminded she can shower with Tegaderm but to avoid scrubbing area    -Reminded to go to ER or call clinic if she experiences fevers/chills, night sweats, SOB, pain at site of dressing or chest pain.    -Cough continued over the weekend, she has had success with doubling her antihistamine in the past which she states she will do now. Also encouraged to try Allegra along with current antihistamine. Consider office visit if this doesn't help her cough.    RTC Friday 3/18 for wound packing and dressing change    No follow-ups on file.    Ben Jewell PA-C  [facility]    Subjective   Ely is a 71 year old who presents for the following health issues     HPI     Patient had I and D of her right upper back with Dr. Zamora on 3/7/22. She hasn't changed the bandage since visit. No fevers, chills, chest pain, SOB or other abnormal symptom. The bandage is intact. She reports her cough continued over the weekend but is dry now. Is using her albuterol 3x a day for the last few days and Delsym as needed. She feels this is due to her allergies and is wondering about doubling her antihistamine dosage. She recently returned from [place] from a vacation.      Cleaned area with alcohol wipe. Removed Tegaderm, gauze covering and iodoform gauze (around 4\"). Repacked wound with approx 2\" iodoform gauze, covered with non-stick pad and replaced Tegaderm dressing.     Review of Systems     CONSTITUTIONAL: NEGATIVE for fever, chills, change in weight  RESP:cough-dry and Hx asthma  CV: NEGATIVE for chest pain, palpitations or peripheral edema  GI: NEGATIVE for nausea, abdominal pain, heartburn, or change in bowel habits      Objective    BP (!) 143/77 (BP Location: Right arm, Patient Position: Sitting, Cuff Size: Adult Large)   Pulse 59   Temp 97.2  F (36.2  C) (Temporal)   Ht 1.664 m (5' 5.5\")   Wt 95.3 kg (210 lb)   SpO2 97%   BMI 34.41 " kg/m    Body mass index is 34.41 kg/m .  Physical Exam   GENERAL: healthy, alert and no distress  RESP: Lungs clear to auscultation RERE, no abnormal sounds noted. Patient has mild dry cough during exam  MS: no gross musculoskeletal defects noted, no edema  Wound: mild erythema noted around incision site. No pus or bleeding noted. Bandage is intact with Tegaderm covering site.

## 2022-03-14 NOTE — NURSING NOTE
Chief Complaint   Patient presents with     Wound Check     recheck/ possibly repack abcess on the back

## 2022-03-18 ENCOUNTER — OFFICE VISIT (OUTPATIENT)
Dept: FAMILY MEDICINE | Facility: CLINIC | Age: 72
End: 2022-03-18

## 2022-03-18 VITALS
DIASTOLIC BLOOD PRESSURE: 82 MMHG | BODY MASS INDEX: 34.41 KG/M2 | SYSTOLIC BLOOD PRESSURE: 136 MMHG | TEMPERATURE: 98 F | HEART RATE: 91 BPM | RESPIRATION RATE: 20 BRPM | OXYGEN SATURATION: 95 % | WEIGHT: 210 LBS

## 2022-03-18 DIAGNOSIS — L02.212 ABSCESS OF BACK: Primary | ICD-10-CM

## 2022-03-18 PROCEDURE — 99213 OFFICE O/P EST LOW 20 MIN: CPT | Performed by: STUDENT IN AN ORGANIZED HEALTH CARE EDUCATION/TRAINING PROGRAM

## 2022-03-18 NOTE — NURSING NOTE
Chief Complaint   Patient presents with     Follow Up     follow up on wound on back, needs to be repacked, still having some drainage       Pre-visit Screening:  Immunizations:  up to date  Colonoscopy:  is up to date  Mammogram: is up to date  Asthma Action Test/Plan:  Will do another time  PHQ9:  Will do another time   GAD7:  Will do another time  Questioned patient about current smoking habits Pt. has never smoked.  Ok to leave detailed message on voice mail for today's visit only Yes, phone # [phone]

## 2022-03-22 ENCOUNTER — OFFICE VISIT (OUTPATIENT)
Dept: FAMILY MEDICINE | Facility: CLINIC | Age: 72
End: 2022-03-22

## 2022-03-22 VITALS
DIASTOLIC BLOOD PRESSURE: 78 MMHG | BODY MASS INDEX: 34.41 KG/M2 | RESPIRATION RATE: 20 BRPM | WEIGHT: 210 LBS | OXYGEN SATURATION: 98 % | SYSTOLIC BLOOD PRESSURE: 134 MMHG | HEART RATE: 58 BPM | TEMPERATURE: 97.1 F

## 2022-03-22 DIAGNOSIS — L02.212 ABSCESS OF BACK: Primary | ICD-10-CM

## 2022-03-22 DIAGNOSIS — Z48.01 ENCOUNTER FOR CHANGE OR REMOVAL OF SURGICAL WOUND DRESSING: ICD-10-CM

## 2022-03-22 PROCEDURE — 99213 OFFICE O/P EST LOW 20 MIN: CPT | Performed by: STUDENT IN AN ORGANIZED HEALTH CARE EDUCATION/TRAINING PROGRAM

## 2022-03-22 NOTE — PROGRESS NOTES
Assessment & Plan     1. Abscess of back  Continues to improve, clinically doing well. Cavity size decreasing. Ongoing wound cares reviewed, follow-up early next week for recheck.     Ramo Zamora MD, [facility]       Subjective     Ely Pollack is a 71 year old female who presents to clinic today for the following health issues:    HPI   Chief Complaint   Patient presents with     Follow Up     follow up on wound on back, needs to be repacked, still having some drainage      Follow-up abscess. Doing well, no fevers/chills. Pain improving. Compliant with wound cares.         Objective    /82 (BP Location: Left arm, Patient Position: Sitting, Cuff Size: Adult Large)   Pulse 91   Temp 98  F (36.7  C) (Temporal)   Resp 20   Wt 95.3 kg (210 lb)   SpO2 95%   BMI 34.41 kg/m    Body mass index is 34.41 kg/m .  Physical Exam   Alert, NAD  NC/AT  Sclerae anicteric  Regular  Resp nonlabored  No focal neuro deficits. Speech intact. Normal gait.  Appropriate affect    Skin warm and dry, minimal erythema surrounding incision with packing intact. Minimal surrounding ttp. Area was cleansed with alcohol and iodoform guaze packing exchanged. Tolerated well.

## 2022-03-22 NOTE — NURSING NOTE
Chief Complaint   Patient presents with     Follow Up     follow up on wound of back, possibly needs a repacking           Statement Selected

## 2022-03-22 NOTE — PROGRESS NOTES
Assessment & Plan     1. Abscess of back  2. Encounter for change or removal of surgical wound dressing  Continues to improve, clinically doing well. Cavity size decreasing. Ongoing wound cares reviewed, follow-up 3-4 days for recheck or sooner prn.     Ramo Zamora MD, [facility]       Subjective     Ely Pollack is a 71 year old female who presents to clinic today for the following health issues:    HPI   Chief Complaint   Patient presents with     Follow Up     follow up on wound of back, possibly needs a repacking       Follow-up abscess. Last seen 3/18. Doing well, no fevers/chills. Pain improving. Compliant with wound cares, had some scant drainage out of bandage last night.         Objective    /78 (BP Location: Left arm, Patient Position: Sitting, Cuff Size: Adult Large)   Pulse 58   Temp 97.1  F (36.2  C) (Temporal)   Resp 20   Wt 95.3 kg (210 lb)   SpO2 98%   BMI 34.41 kg/m    Body mass index is 34.41 kg/m .  Physical Exam   Alert, NAD  NC/AT  Sclerae anicteric  Regular  Resp nonlabored  No focal neuro deficits. Speech intact. Normal gait.  Appropriate affect    Skin warm and dry, decreased erythema surrounding incision with packing intact. No surrounding ttp. Area was cleansed with alcohol and iodoform guaze packing exchanged, ~3cm used today. Tolerated well.

## 2022-03-25 ENCOUNTER — OFFICE VISIT (OUTPATIENT)
Dept: FAMILY MEDICINE | Facility: CLINIC | Age: 72
End: 2022-03-25

## 2022-03-25 VITALS
RESPIRATION RATE: 20 BRPM | SYSTOLIC BLOOD PRESSURE: 126 MMHG | OXYGEN SATURATION: 95 % | HEART RATE: 57 BPM | DIASTOLIC BLOOD PRESSURE: 86 MMHG | TEMPERATURE: 98 F | BODY MASS INDEX: 34.41 KG/M2 | WEIGHT: 210 LBS

## 2022-03-25 DIAGNOSIS — L02.212 ABSCESS OF BACK: Primary | ICD-10-CM

## 2022-03-25 DIAGNOSIS — Z48.01 ENCOUNTER FOR CHANGE OR REMOVAL OF SURGICAL WOUND DRESSING: ICD-10-CM

## 2022-03-25 DIAGNOSIS — I10 ESSENTIAL HYPERTENSION: ICD-10-CM

## 2022-03-25 DIAGNOSIS — F32.89 OTHER DEPRESSION: ICD-10-CM

## 2022-03-25 DIAGNOSIS — F41.9 ANXIETY: ICD-10-CM

## 2022-03-25 PROCEDURE — 99213 OFFICE O/P EST LOW 20 MIN: CPT | Performed by: STUDENT IN AN ORGANIZED HEALTH CARE EDUCATION/TRAINING PROGRAM

## 2022-03-25 RX ORDER — BUSPIRONE HYDROCHLORIDE 15 MG/1
TABLET ORAL
Qty: 90 TABLET | Refills: 1 | COMMUNITY
Start: 2022-03-25

## 2022-03-25 RX ORDER — ATENOLOL 50 MG/1
TABLET ORAL
Qty: 90 TABLET | Refills: 1 | COMMUNITY
Start: 2022-03-25

## 2022-03-25 RX ORDER — CITALOPRAM HYDROBROMIDE 40 MG/1
TABLET ORAL
Qty: 90 TABLET | Refills: 1 | COMMUNITY
Start: 2022-03-25

## 2022-03-25 NOTE — PROGRESS NOTES
Assessment & Plan     1. Abscess of back  2. Encounter for change or removal of surgical wound dressing  Continued improvement, clinically doing well. Cavity size decreasing. Ongoing wound cares reviewed, follow-up 3-4 days for recheck or sooner prn. Anticipate 1-2 additional visits.     Ramo Zamora MD, [facility]       Subjective     Ely Pollack is a 71 year old female who presents to clinic today for the following health issues:    HPI   Chief Complaint   Patient presents with     Follow Up     repack of open wound on back       Follow-up abscess. Last seen 3/22. Doing well, no fevers/chills. Pain minimal. Compliant with wound cares, no drainage or redness.         Objective    /86 (BP Location: Left arm, Patient Position: Sitting, Cuff Size: Adult Large)   Pulse 57   Temp 98  F (36.7  C) (Temporal)   Resp 20   Wt 95.3 kg (210 lb)   SpO2 95%   BMI 34.41 kg/m    Body mass index is 34.41 kg/m .  Physical Exam   Alert, NAD  NC/AT  Sclerae anicteric  Regular  Resp nonlabored  No focal neuro deficits. Speech intact. Normal gait.  Appropriate affect    Skin warm and dry, decreased erythema surrounding incision with packing intact. No surrounding ttp. Area was cleansed with alcohol and iodoform guaze packing exchanged, ~2cm used today. Tolerated well.

## 2022-03-25 NOTE — NURSING NOTE
Chief Complaint   Patient presents with     Follow Up     repack of open wound on back      Pre-visit Screening:  Immunizations:  up to date  Colonoscopy:  is up to date  Mammogram: is up to date  Asthma Action Test/Plan:  NA  PHQ9:  NA  GAD7:  NA  Questioned patient about current smoking habits Pt. has never smoked.  Ok to leave detailed message on voice mail for today's visit only Yes, phone # [phone]

## 2022-03-25 NOTE — TELEPHONE ENCOUNTER
Ely Pollack is requesting a refill of:    Refused Prescriptions:                       Disp   Refills    atenolol (TENORMIN) 50 MG tablet [Pharmacy*90 tab*1        Sig: TAKE 1 TABLET BY MOUTH EVERY DAY  Refused By: HECTOR QUIÑONEZ  Reason for Refusal: Patient needs appointment    citalopram (CELEXA) 40 MG tablet [Pharmacy*90 tab*1        Sig: TAKE 1 TABLET BY MOUTH EVERY DAY  Refused By: HECTOR QUIÑONEZ  Reason for Refusal: Patient needs appointment    busPIRone (BUSPAR) 15 MG tablet [Pharmacy *90 tab*1        Sig: TAKE 0.5 TABLETS BY MOUTH 2 TIMES DAILY  Refused By: HECTOR QUIÑONEZ  Reason for Refusal: Patient needs appointment    Will check with pt if she needs extension

## 2022-03-29 ENCOUNTER — OFFICE VISIT (OUTPATIENT)
Dept: FAMILY MEDICINE | Facility: CLINIC | Age: 72
End: 2022-03-29

## 2022-03-29 VITALS
DIASTOLIC BLOOD PRESSURE: 86 MMHG | OXYGEN SATURATION: 95 % | TEMPERATURE: 98 F | HEART RATE: 53 BPM | WEIGHT: 210 LBS | BODY MASS INDEX: 34.41 KG/M2 | SYSTOLIC BLOOD PRESSURE: 134 MMHG | RESPIRATION RATE: 20 BRPM

## 2022-03-29 DIAGNOSIS — L02.212 ABSCESS OF BACK: Primary | ICD-10-CM

## 2022-03-29 DIAGNOSIS — Z48.01 ENCOUNTER FOR CHANGE OR REMOVAL OF SURGICAL WOUND DRESSING: ICD-10-CM

## 2022-03-29 PROCEDURE — 99213 OFFICE O/P EST LOW 20 MIN: CPT | Performed by: STUDENT IN AN ORGANIZED HEALTH CARE EDUCATION/TRAINING PROGRAM

## 2022-03-29 NOTE — NURSING NOTE
Chief Complaint   Patient presents with     Follow Up     repacking of wound on back, starting to become better, no concerns     Pre-visit Screening:  Immunizations:  up to date  Colonoscopy:  is up to date  Mammogram: is up to date  Asthma Action Test/Plan:  NA  PHQ9:  NA  GAD7:  NA  Questioned patient about current smoking habits Pt. has never smoked.  Ok to leave detailed message on voice mail for today's visit only yes, phone # [phone]

## 2022-03-29 NOTE — PROGRESS NOTES
Assessment & Plan     1. Abscess of back  2. Encounter for change or removal of surgical wound dressing  Continued improvement, clinically doing well. Cavity size continues to decrease. Ongoing wound cares reviewed, follow-up 3-4 days for recheck or sooner prn. Anticipate 1 additional visit.     Ramo Zamora MD, [facility]       Subjective     Ely Pollack is a 71 year old female who presents to clinic today for the following health issues:    HPI   Chief Complaint   Patient presents with     Follow Up     repacking of wound on back, starting to become better, no concerns      Follow-up abscess. Last seen 3/25. Doing well, no fevers/chills. Pain none. Compliant with wound cares, no drainage or redness.         Objective    /86 (BP Location: Left arm, Patient Position: Sitting, Cuff Size: Adult Large)   Pulse 53   Temp 98  F (36.7  C) (Temporal)   Resp 20   Wt 95.3 kg (210 lb)   SpO2 95%   BMI 34.41 kg/m    Body mass index is 34.41 kg/m .  Physical Exam   Alert, NAD  NC/AT  Sclerae anicteric  Regular  Resp nonlabored  No focal neuro deficits. Speech intact. Normal gait.  Appropriate affect    Skin warm and dry, decreased erythema surrounding incision with packing intact. No surrounding ttp. Area was cleansed with alcohol and iodoform guaze packing exchanged, ~2cm used today. Tolerated well.

## 2022-04-01 ENCOUNTER — OFFICE VISIT (OUTPATIENT)
Dept: FAMILY MEDICINE | Facility: CLINIC | Age: 72
End: 2022-04-01

## 2022-04-01 VITALS
WEIGHT: 210 LBS | OXYGEN SATURATION: 98 % | TEMPERATURE: 98 F | SYSTOLIC BLOOD PRESSURE: 122 MMHG | DIASTOLIC BLOOD PRESSURE: 78 MMHG | BODY MASS INDEX: 33.75 KG/M2 | HEIGHT: 66 IN | HEART RATE: 53 BPM

## 2022-04-01 DIAGNOSIS — L02.212 ABSCESS OF BACK: Primary | ICD-10-CM

## 2022-04-01 DIAGNOSIS — Z48.01 ENCOUNTER FOR CHANGE OR REMOVAL OF SURGICAL WOUND DRESSING: ICD-10-CM

## 2022-04-01 PROCEDURE — 99213 OFFICE O/P EST LOW 20 MIN: CPT | Performed by: STUDENT IN AN ORGANIZED HEALTH CARE EDUCATION/TRAINING PROGRAM

## 2022-04-01 NOTE — PROGRESS NOTES
"Assessment & Plan     1. Abscess of back  2. Encounter for change or removal of surgical wound dressing  Continued improvement, clinically doing well. Cavity size continues to decrease but progress slowed, discussed option of stopping packing vs continuing and pt would prefer to continue to ensure complete healing by secondary intention. Ongoing wound cares reviewed, follow-up next week for recheck or sooner prn. Anticipate 1 additional visit.     Ramo Zamora MD, [facility]       Subjective     Ely Pollack is a 71 year old female who presents to clinic today for the following health issues:    HPI   Chief Complaint   Patient presents with     Derm Problem     recheck wound on her back, continued packing it      Follow-up abscess. Last seen 3/29. Doing well, no fevers/chills. No pain. Some light discharge noted on guaze. Compliant with wound cares, no drainage or redness.         Objective    /78 (BP Location: Left arm, Patient Position: Sitting, Cuff Size: Adult Regular)   Pulse 53   Temp 98  F (36.7  C) (Temporal)   Ht 1.664 m (5' 5.5\")   Wt 95.3 kg (210 lb)   SpO2 98%   BMI 34.41 kg/m    Body mass index is 34.41 kg/m .  Physical Exam   Alert, NAD  NC/AT  Sclerae anicteric  Regular  Resp nonlabored  No focal neuro deficits. Speech intact. Normal gait.  Appropriate affect    Skin warm and dry, no surrounding erythema now, incision with packing intact. No surrounding ttp. Area was cleansed with alcohol and iodoform guaze packing exchanged, ~2cm again used today. Tolerated well.        " Other (Free Text): Picture taken to send to Dr. Marquez's office. Information faxed to his office. Detail Level: Simple Note Text (......Xxx Chief Complaint.): This diagnosis correlates with the

## 2022-04-01 NOTE — PROGRESS NOTES
SUBJECTIVE:   CC: Ely Pollack is an 71 year old woman who presents for preventive health visit.       Patient has been advised of split billing requirements and indicates understanding: Yes  HPI    Here for a physical. She is doing well.    Depression: and anxiety, controlled on the medications, due for refills.  Asthma--using albuterol as needed. Using it once a day, some days not at all.  Hypertension--controlled today, doesn't check it at home. Is doing well on the medications, due for labs and refills. No problems with side effectc.        Today's PHQ-2 Score:   PHQ-2 ( 1999 [employer]) 3/7/2022   Q1: Little interest or pleasure in doing things 0   Q2: Feeling down, depressed or hopeless 0   PHQ-2 Score 0   PHQ-2 Total Score (12-17 Years)- Positive if 3 or more points; Administer PHQ-A if positive -         Do you feel safe in your environment? Yes        Social History     Tobacco Use     Smoking status: Never Smoker     Smokeless tobacco: Never Used   Substance Use Topics     Alcohol use: Yes     Comment: 3 drinks weekly     No concerns about alcohol use.    No flowsheet data found.    Reviewed orders with patient.  Reviewed health maintenance and updated orders accordingly - Yes  BP Readings from Last 3 Encounters:   04/05/22 128/82   04/01/22 122/78   03/29/22 134/86    Wt Readings from Last 3 Encounters:   04/05/22 95.3 kg (210 lb)   04/01/22 95.3 kg (210 lb)   03/29/22 95.3 kg (210 lb)                  Patient Active Problem List   Diagnosis     History of bilateral knee arthroplasty     Essential hypertension     Anxiety     Other depression     Chronic back pain, unspecified back location, unspecified back pain laterality     Bee sting allergy     Leg cramps     Chronic neck pain     Asthma     Cervical radiculopathy at C7     Choroidal nevus, right eye     Degenerative arthritis of lumbar spine     Degenerative drusen, bilateral     Dermatochalasis of both upper eyelids     History of squamous cell  carcinoma of skin     Major depressive disorder, recurrent episode, moderate (H)     Nuclear sclerotic cataract, bilateral     Presbyopia     Regular astigmatism, bilateral     Squamous cell carcinoma of lower leg, left     Past Surgical History:   Procedure Laterality Date     ARTHROPLASTY KNEE BILATERAL Bilateral 3/29/2016    Procedure: ARTHROPLASTY KNEE BILATERAL;  Surgeon: Darryn Cortez MD;  Location: [facility] OR     AS DECOMPRESS SPINAL CORD,1 SEG      Thorasic     AS SPINAL FUSION,ANT,EA ADNL LEVEL      Thorasic     BUNIONECTOMY Left        Social History     Tobacco Use     Smoking status: Never Smoker     Smokeless tobacco: Never Used   Substance Use Topics     Alcohol use: Yes     Comment: 3 drinks weekly     No family history on file.      Current Outpatient Medications   Medication Sig Dispense Refill     albuterol (PROAIR HFA/PROVENTIL HFA/VENTOLIN HFA) 108 (90 Base) MCG/ACT inhaler Inhale 2 puffs into the lungs every 6 hours 18 g 1     aspirin (ASA) 81 MG chewable tablet 2 tablets daily       atenolol (TENORMIN) 50 MG tablet Take 1 tablet (50 mg) by mouth daily 90 tablet 1     busPIRone (BUSPAR) 15 MG tablet Take 0.5 tablets (7.5 mg) by mouth 2 times daily 90 tablet 1     citalopram (CELEXA) 40 MG tablet Take 1 tablet (40 mg) by mouth daily 90 tablet 1     EPINEPHrine (ANY BX GENERIC EQUIV) 0.3 MG/0.3ML injection 2-pack Inject 0.3 mg into the muscle as needed for anaphylaxis        hydrochlorothiazide (HYDRODIURIL) 25 MG tablet Take 1 tablet (25 mg) by mouth daily 90 tablet 1     KLOR-CON 20 MEQ CR tablet Take 1 tablet (20 mEq) by mouth daily 90 tablet 1     MAGNESIUM GLUCONATE  mg daily       potassium chloride jeevan er (K-DUR) Take 10 mEq by mouth daily         Breast Cancer Screening:    FHS-7: No flowsheet data found.  Mammogram--last week    Pertinent mammograms are reviewed under the imaging tab.    History of abnormal Pap smear: history normal. No recent vaginal bleeding.     Reviewed and  "updated as needed this visit by clinical staff   Tobacco  Allergies   Problems             Reviewed and updated as needed this visit by Provider                 Past Medical History:   Diagnosis Date     Arthritis      Hypertension     Cardiology No     Other chronic pain     Pain for 10 years     PONV (postoperative nausea and vomiting)       Past Surgical History:   Procedure Laterality Date     ARTHROPLASTY KNEE BILATERAL Bilateral 3/29/2016    Procedure: ARTHROPLASTY KNEE BILATERAL;  Surgeon: Darryn Cortez MD;  Location: [facility] OR     AS DECOMPRESS SPINAL CORD,1 SEG      Thorasic     AS SPINAL FUSION,ANT,EA ADNL LEVEL      Thorasic     BUNIONECTOMY Left        Review of Systems  CONSTITUTIONAL: NEGATIVE for fever, chills, change in weight  INTEGUMENTARY/SKIN: NEGATIVE for worrisome rashes, moles or lesions  EYES: NEGATIVE for vision changes or irritation  ENT: NEGATIVE for ear, mouth and throat problems  RESP: NEGATIVE for significant cough or SOB  BREAST: NEGATIVE for masses, tenderness or discharge  CV: NEGATIVE for chest pain, palpitations or peripheral edema  GI: NEGATIVE for nausea, abdominal pain, heartburn, or change in bowel habits  : NEGATIVE for unusual urinary or vaginal symptoms. No vaginal bleeding.  MUSCULOSKELETAL: NEGATIVE for significant arthralgias or myalgia  NEURO: NEGATIVE for weakness, dizziness or paresthesias  PSYCHIATRIC: NEGATIVE for changes in mood or affect      OBJECTIVE:   /82 (BP Location: Right arm, Patient Position: Sitting, Cuff Size: Adult Large)   Pulse 65   Temp 97.9  F (36.6  C) (Temporal)   Ht 1.664 m (5' 5.5\")   Wt 95.3 kg (210 lb)   SpO2 95%   BMI 34.41 kg/m    Physical Exam          ASSESSMENT/PLAN:   1. Essential hypertension  Controlled, refilled medications, check labs.  - KLOR-CON 20 MEQ CR tablet; Take 1 tablet (20 mEq) by mouth daily  Dispense: 90 tablet; Refill: 1  - hydrochlorothiazide (HYDRODIURIL) 25 MG tablet; Take 1 tablet (25 mg) by mouth " "daily  Dispense: 90 tablet; Refill: 1  - atenolol (TENORMIN) 50 MG tablet; Take 1 tablet (50 mg) by mouth daily  Dispense: 90 tablet; Refill: 1  - VENOUS COLLECTION  - Comprehensive Metobolic Panel (BFP)  - Lipid Panel (BFP)    2. Other depression  Controlled on medications, refilled.  - citalopram (CELEXA) 40 MG tablet; Take 1 tablet (40 mg) by mouth daily  Dispense: 90 tablet; Refill: 1  - busPIRone (BUSPAR) 15 MG tablet; Take 0.5 tablets (7.5 mg) by mouth 2 times daily  Dispense: 90 tablet; Refill: 1  - DEPRESSION ACTION PLAN (DAP)    3. Anxiety  Controlled.  - citalopram (CELEXA) 40 MG tablet; Take 1 tablet (40 mg) by mouth daily  Dispense: 90 tablet; Refill: 1  - busPIRone (BUSPAR) 15 MG tablet; Take 0.5 tablets (7.5 mg) by mouth 2 times daily  Dispense: 90 tablet; Refill: 1  - DEPRESSION ACTION PLAN (DAP)    4. Seasonal allergic rhinitis due to other allergic trigger  Controlled. If she is needing the albuterol inhaler consistently daily come in to start a steroid inhaler.discussed.  - albuterol (PROAIR HFA/PROVENTIL HFA/VENTOLIN HFA) 108 (90 Base) MCG/ACT inhaler; Inhale 2 puffs into the lungs every 6 hours  Dispense: 18 g; Refill: 1  - Asthma Action Plan (AAP)        COUNSELING:  Reviewed preventive health counseling, as reflected in patient instructions       Regular exercise       Healthy diet/nutrition    Estimated body mass index is 34.41 kg/m  as calculated from the following:    Height as of this encounter: 1.664 m (5' 5.5\").    Weight as of this encounter: 95.3 kg (210 lb).    Weight management plan: Discussed healthy diet and exercise guidelines    She reports that she has never smoked. She has never used smokeless tobacco.        Noemi Kirkpatrick MD  [facility]  "

## 2022-04-04 DIAGNOSIS — I10 ESSENTIAL HYPERTENSION: ICD-10-CM

## 2022-04-05 ENCOUNTER — OFFICE VISIT (OUTPATIENT)
Dept: FAMILY MEDICINE | Facility: CLINIC | Age: 72
End: 2022-04-05

## 2022-04-05 VITALS
HEIGHT: 66 IN | SYSTOLIC BLOOD PRESSURE: 128 MMHG | HEART RATE: 65 BPM | BODY MASS INDEX: 33.75 KG/M2 | OXYGEN SATURATION: 95 % | TEMPERATURE: 97.9 F | DIASTOLIC BLOOD PRESSURE: 82 MMHG | WEIGHT: 210 LBS

## 2022-04-05 DIAGNOSIS — F41.9 ANXIETY: ICD-10-CM

## 2022-04-05 DIAGNOSIS — Z00.00 ROUTINE GENERAL MEDICAL EXAMINATION AT A HEALTH CARE FACILITY: Primary | ICD-10-CM

## 2022-04-05 DIAGNOSIS — R73.09 HIGH GLUCOSE: ICD-10-CM

## 2022-04-05 DIAGNOSIS — F32.89 OTHER DEPRESSION: ICD-10-CM

## 2022-04-05 DIAGNOSIS — J30.89 SEASONAL ALLERGIC RHINITIS DUE TO OTHER ALLERGIC TRIGGER: ICD-10-CM

## 2022-04-05 DIAGNOSIS — I10 ESSENTIAL HYPERTENSION: ICD-10-CM

## 2022-04-05 LAB
ALBUMIN SERPL-MCNC: 4.4 G/DL (ref 3.6–5.1)
ALBUMIN/GLOB SERPL: 1.5 {RATIO} (ref 1–2.5)
ALP SERPL-CCNC: 85 U/L (ref 33–130)
ALT 1742-6: 25 U/L (ref 0–32)
AST 1920-8: 20 U/L (ref 0–35)
BILIRUB SERPL-MCNC: 1 MG/DL (ref 0.2–1.2)
BUN SERPL-MCNC: 13 MG/DL (ref 7–25)
BUN/CREATININE RATIO: 18.6 (ref 6–22)
CALCIUM SERPL-MCNC: 9.2 MG/DL (ref 8.6–10.3)
CHLORIDE SERPLBLD-SCNC: 95.5 MMOL/L (ref 98–110)
CHOLEST SERPL-MCNC: 185 MG/DL (ref 0–199)
CHOLEST/HDLC SERPL: 3 {RATIO} (ref 0–5)
CO2 SERPL-SCNC: 30.9 MMOL/L (ref 20–32)
CREAT SERPL-MCNC: 0.7 MG/DL (ref 0.6–1.3)
GLOBULIN, CALCULATED - QUEST: 2.9 (ref 1.9–3.7)
GLUCOSE SERPL-MCNC: 113 MG/DL (ref 60–99)
HBA1C MFR BLD: 5.9 % (ref 4–7)
HDLC SERPL-MCNC: 68 MG/DL (ref 40–150)
LDLC SERPL CALC-MCNC: 98 MG/DL (ref 0–130)
POTASSIUM SERPL-SCNC: 4.62 MMOL/L (ref 3.5–5.3)
PROT SERPL-MCNC: 7.3 G/DL (ref 6.1–8.1)
SODIUM SERPL-SCNC: 134.4 MMOL/L (ref 135–146)
TRIGL SERPL-MCNC: 95 MG/DL (ref 0–149)

## 2022-04-05 PROCEDURE — 83036 HEMOGLOBIN GLYCOSYLATED A1C: CPT | Performed by: FAMILY MEDICINE

## 2022-04-05 PROCEDURE — 36415 COLL VENOUS BLD VENIPUNCTURE: CPT | Performed by: FAMILY MEDICINE

## 2022-04-05 PROCEDURE — 99397 PER PM REEVAL EST PAT 65+ YR: CPT | Performed by: FAMILY MEDICINE

## 2022-04-05 PROCEDURE — 80061 LIPID PANEL: CPT | Performed by: FAMILY MEDICINE

## 2022-04-05 PROCEDURE — 80053 COMPREHEN METABOLIC PANEL: CPT | Performed by: FAMILY MEDICINE

## 2022-04-05 RX ORDER — CITALOPRAM HYDROBROMIDE 40 MG/1
40 TABLET ORAL DAILY
Qty: 90 TABLET | Refills: 1 | Status: SHIPPED | OUTPATIENT
Start: 2022-04-05 | End: 2022-10-13

## 2022-04-05 RX ORDER — HYDROCHLOROTHIAZIDE 25 MG/1
25 TABLET ORAL DAILY
Qty: 90 TABLET | Refills: 1 | Status: SHIPPED | OUTPATIENT
Start: 2022-04-05 | End: 2022-10-13

## 2022-04-05 RX ORDER — ATENOLOL 50 MG/1
50 TABLET ORAL DAILY
Qty: 90 TABLET | Refills: 1 | Status: SHIPPED | OUTPATIENT
Start: 2022-04-05 | End: 2022-10-13

## 2022-04-05 RX ORDER — POTASSIUM CHLORIDE 1500 MG/1
20 TABLET, EXTENDED RELEASE ORAL DAILY
Qty: 90 TABLET | Refills: 1 | Status: SHIPPED | OUTPATIENT
Start: 2022-04-05 | End: 2023-06-05

## 2022-04-05 RX ORDER — BUSPIRONE HYDROCHLORIDE 15 MG/1
7.5 TABLET ORAL 2 TIMES DAILY
Qty: 90 TABLET | Refills: 1 | Status: SHIPPED | OUTPATIENT
Start: 2022-04-05 | End: 2022-10-13

## 2022-04-05 RX ORDER — POTASSIUM CHLORIDE 1500 MG/1
TABLET, EXTENDED RELEASE ORAL
Qty: 90 TABLET | Refills: 1 | COMMUNITY
Start: 2022-04-05

## 2022-04-05 RX ORDER — ALBUTEROL SULFATE 90 UG/1
2 AEROSOL, METERED RESPIRATORY (INHALATION) EVERY 6 HOURS
Qty: 18 G | Refills: 1 | Status: SHIPPED | OUTPATIENT
Start: 2022-04-05 | End: 2022-11-29

## 2022-04-05 ASSESSMENT — ANXIETY QUESTIONNAIRES
7. FEELING AFRAID AS IF SOMETHING AWFUL MIGHT HAPPEN: NOT AT ALL
5. BEING SO RESTLESS THAT IT IS HARD TO SIT STILL: NOT AT ALL
IF YOU CHECKED OFF ANY PROBLEMS ON THIS QUESTIONNAIRE, HOW DIFFICULT HAVE THESE PROBLEMS MADE IT FOR YOU TO DO YOUR WORK, TAKE CARE OF THINGS AT HOME, OR GET ALONG WITH OTHER PEOPLE: NOT DIFFICULT AT ALL
6. BECOMING EASILY ANNOYED OR IRRITABLE: SEVERAL DAYS
3. WORRYING TOO MUCH ABOUT DIFFERENT THINGS: SEVERAL DAYS
2. NOT BEING ABLE TO STOP OR CONTROL WORRYING: NOT AT ALL
1. FEELING NERVOUS, ANXIOUS, OR ON EDGE: SEVERAL DAYS
GAD7 TOTAL SCORE: 3

## 2022-04-05 ASSESSMENT — PATIENT HEALTH QUESTIONNAIRE - PHQ9
5. POOR APPETITE OR OVEREATING: NOT AT ALL
SUM OF ALL RESPONSES TO PHQ QUESTIONS 1-9: 2

## 2022-04-05 ASSESSMENT — ASTHMA QUESTIONNAIRES: ACT_TOTALSCORE: 19

## 2022-04-05 NOTE — PATIENT INSTRUCTIONS
Preventive Health Recommendations    See your health care provider every year to    Review health changes.     Discuss preventive care.      Review your medicines if your doctor has prescribed any.      You no longer need a yearly Pap test unless you've had an abnormal Pap test in the past 10 years. If you have vaginal symptoms, such as bleeding or discharge, be sure to talk with your provider about a Pap test.      Every 1 to 2 years, have a mammogram.  If you are over 69, talk with your health care provider about whether or not you want to continue having screening mammograms.      Every 10 years, have a colonoscopy. Or, have a yearly FIT test (stool test). These exams will check for colon cancer.       Have a cholesterol test every 5 years, or more often if your doctor advises it.       Have a diabetes test (fasting glucose) every three years. If you are at risk for diabetes, you should have this test more often.       At age 65, have a bone density scan (DEXA) to check for osteoporosis (brittle bone disease).    Shots:    Get a flu shot each year.    Get a tetanus shot every 10 years.    Talk to your doctor about your pneumonia vaccines. There are now two you should receive - Pneumovax (PPSV 23) and Prevnar (PCV 13).    Talk to your pharmacist about the shingles vaccine.    Talk to your doctor about the hepatitis B vaccine.    Nutrition:     Eat at least 5 servings of fruits and vegetables each day.      Eat whole-grain bread, whole-wheat pasta and brown rice instead of white grains and rice.      Get adequate about Calcium and Vitamin D.     Lifestyle    Exercise at least 150 minutes a week (30 minutes a day, 5 days a week). This will help you control your weight and prevent disease.      Limit alcohol to one drink per day.      No smoking.       Wear sunscreen to prevent skin cancer.       See your dentist twice a year for an exam and cleaning.      See your eye doctor every 1 to 2 years to screen for  conditions such as glaucoma, macular degeneration, cataracts, etc.    Personalized Prevention Plan  You are due for the preventive services outlined below.  Your care team is available to assist you in scheduling these services.  If you have already completed any of these items, please share that information with your care team to update in your medical record.    Health Maintenance Due   Topic Date Due     Annual Wellness Visit  02/12/2022     ASSESSMENT/PLAN:   1. Essential hypertension  Controlled, refilled medications, check labs.  - KLOR-CON 20 MEQ CR tablet; Take 1 tablet (20 mEq) by mouth daily  Dispense: 90 tablet; Refill: 1  - hydrochlorothiazide (HYDRODIURIL) 25 MG tablet; Take 1 tablet (25 mg) by mouth daily  Dispense: 90 tablet; Refill: 1  - atenolol (TENORMIN) 50 MG tablet; Take 1 tablet (50 mg) by mouth daily  Dispense: 90 tablet; Refill: 1  - VENOUS COLLECTION  - Comprehensive Metobolic Panel (BFP)  - Lipid Panel (BFP)    2. Other depression  Controlled on medications, refilled.  - citalopram (CELEXA) 40 MG tablet; Take 1 tablet (40 mg) by mouth daily  Dispense: 90 tablet; Refill: 1  - busPIRone (BUSPAR) 15 MG tablet; Take 0.5 tablets (7.5 mg) by mouth 2 times daily  Dispense: 90 tablet; Refill: 1  - DEPRESSION ACTION PLAN (DAP)    3. Anxiety  Controlled.  - citalopram (CELEXA) 40 MG tablet; Take 1 tablet (40 mg) by mouth daily  Dispense: 90 tablet; Refill: 1  - busPIRone (BUSPAR) 15 MG tablet; Take 0.5 tablets (7.5 mg) by mouth 2 times daily  Dispense: 90 tablet; Refill: 1  - DEPRESSION ACTION PLAN (DAP)    4. Seasonal allergic rhinitis due to other allergic trigger  Controlled. If she is needing the albuterol inhaler consistently daily come in to start a steroid inhaler.discussed.  - albuterol (PROAIR HFA/PROVENTIL HFA/VENTOLIN HFA) 108 (90 Base) MCG/ACT inhaler; Inhale 2 puffs into the lungs every 6 hours  Dispense: 18 g; Refill: 1  - Asthma Action Plan (AAP)        COUNSELING:  Reviewed preventive  "health counseling, as reflected in patient instructions       Regular exercise       Healthy diet/nutrition    Estimated body mass index is 34.41 kg/m  as calculated from the following:    Height as of this encounter: 1.664 m (5' 5.5\").    Weight as of this encounter: 95.3 kg (210 lb).    Weight management plan: Discussed healthy diet and exercise guidelines    She reports that she has never smoked. She has never used smokeless tobacco.        Noemi Kirkpatrick MD  [facility]  "

## 2022-04-05 NOTE — NURSING NOTE
Chief Complaint   Patient presents with     Physical     fasting today      Recheck Medication     refill medications      Pre-visit Screening:  Immunizations:  up to date  Colonoscopy:  is up to date  Mammogram: is up to date  Asthma Action Test/Plan:  NA  PHQ9:  NA  GAD7:  NA  Questioned patient about current smoking habits Pt. has never smoked.  Ok to leave detailed message on voice mail for today's visit only Yes, phone # [phone]

## 2022-04-05 NOTE — LETTER
My Asthma Action Plan    Name: Ely Pollack   YOB: 1950  Date: 4/5/2022   My doctor: Noemi Kirkpatrick MD   My clinic: [facility]        My Rescue Medicine:   Albuterol inhaler (Proair/Ventolin/Proventil HFA)  2-4 puffs EVERY 4 HOURS as needed. Use a spacer if recommended by your provider.   My Asthma Severity:   Intermittent / Exercise Induced  Know your asthma triggers: .             GREEN ZONE   Good Control    I feel good    No cough or wheeze    Can work, sleep and play without asthma symptoms       Take your asthma control medicine every day.     1. If exercise triggers your asthma, take your rescue medication    15 minutes before exercise or sports, and    During exercise if you have asthma symptoms  2. Spacer to use with inhaler: If you have a spacer, make sure to use it with your inhaler             YELLOW ZONE Getting Worse  I have ANY of these:    I do not feel good    Cough or wheeze    Chest feels tight    Wake up at night   1. Keep taking your Green Zone medications  2. Start taking your rescue medicine:    every 20 minutes for up to 1 hour. Then every 4 hours for 24-48 hours.  3. If you stay in the Yellow Zone for more than 12-24 hours, contact your doctor.  4. If you do not return to the Green Zone in 12-24 hours or you get worse, start taking your oral steroid medicine if prescribed by your provider.           RED ZONE Medical Alert - Get Help  I have ANY of these:    I feel awful    Medicine is not helping    Breathing getting harder    Trouble walking or talking    Nose opens wide to breathe       1. Take your rescue medicine NOW  2. If your provider has prescribed an oral steroid medicine, start taking it NOW  3. Call your doctor NOW  4. If you are still in the Red Zone after 20 minutes and you have not reached your doctor:    Take your rescue medicine again and    Call 911 or go to the emergency room right away    See your regular doctor within 2 weeks of an  Emergency Room or Urgent Care visit for follow-up treatment.          Annual Reminders:  Meet with Asthma Educator,  Flu Shot in the Fall, consider Pneumonia Vaccination for patients with asthma (aged 19 and older).    Pharmacy: [facility] [id number] [place] [address]    Electronically signed by Noemi Kirkpatrick MD   Date: 04/05/22                    Asthma Triggers  How To Control Things That Make Your Asthma Worse    Triggers are things that make your asthma worse.  Look at the list below to help you find your triggers and   what you can do about them. You can help prevent asthma flare-ups by staying away from your triggers.      Trigger                                                          What you can do   Cigarette Smoke  Tobacco smoke can make asthma worse. Do not allow smoking in your home, car or around you.  Be sure no one smokes at a child s day care or school.  If you smoke, ask your health care provider for ways to help you quit.  Ask family members to quit too.  Ask your health care provider for a referral to Quit Plan to help you quit smoking, or call 6-869-974-PLAN.     Colds, Flu, Bronchitis  These are common triggers of asthma. Wash your hands often.  Don t touch your eyes, nose or mouth.  Get a flu shot every year.     Dust Mites  These are tiny bugs that live in cloth or carpet. They are too small to see. Wash sheets and blankets in hot water every week.   Encase pillows and mattress in dust mite proof covers.  Avoid having carpet if you can. If you have carpet, vacuum weekly.   Use a dust mask and HEPA vacuum.   Pollen and Outdoor Mold  Some people are allergic to trees, grass, or weed pollen, or molds. Try to keep your windows closed.  Limit time out doors when pollen count is high.   Ask you health care provider about taking medicine during allergy season.     Animal Dander  Some people are allergic to skin flakes, urine or saliva from pets with fur or feathers. Keep pets with  fur or feathers out of your home.    If you can t keep the pet outdoors, then keep the pet out of your bedroom.  Keep the bedroom door closed.  Keep pets off cloth furniture and away from stuffed toys.     Mice, Rats, and Cockroaches  Some people are allergic to the waste from these pests.   Cover food and garbage.  Clean up spills and food crumbs.  Store grease in the refrigerator.   Keep food out of the bedroom.   Indoor Mold  This can be a trigger if your home has high moisture. Fix leaking faucets, pipes, or other sources of water.   Clean moldy surfaces.  Dehumidify basement if it is damp and smelly.   Smoke, Strong Odors, and Sprays  These can reduce air quality. Stay away from strong odors and sprays, such as perfume, powder, hair spray, paints, smoke incense, paint, cleaning products, candles and new carpet.   Exercise or Sports  Some people with asthma have this trigger. Be active!  Ask your doctor about taking medicine before sports or exercise to prevent symptoms.    Warm up for 5-10 minutes before and after sports or exercise.     Other Triggers of Asthma  Cold air:  Cover your nose and mouth with a scarf.  Sometimes laughing or crying can be a trigger.  Some medicines and food can trigger asthma.

## 2022-04-05 NOTE — TELEPHONE ENCOUNTER
Ely Plolack is requesting a refill of:    Refused Prescriptions:                       Disp   Refills    KLOR-CON 20 MEQ CR tablet [Pharmacy Med Na*90 tab*1        Sig: TAKE 1 TABLET BY MOUTH DAILY  Refused By: HECTOR QUIÑONEZ  Reason for Refusal: Patient needs appointment    Pt has appt today, will refill then

## 2022-04-05 NOTE — LETTER
My Depression Action Plan  Name: Ely Pollack   Date of Birth 1950  Date: 4/5/2022    My doctor: Noemi Kirkpatrick   My clinic: [facility]  [address] [place] [address]  [address]  [facility] [id number] [phone]          GREEN    ZONE   Good Control    What it looks like:     Things are going generally well. You have normal ups and downs. You may even feel depressed from time to time, but bad moods usually last less than a day.   What you need to do:  1. Continue to care for yourself (see self care plan)  2. Check your depression survival kit and update it as needed  3. Follow your physician s recommendations including any medication.  4. Do not stop taking medication unless you consult with your physician first.           YELLOW         ZONE Getting Worse    What it looks like:     Depression is starting to interfere with your life.     It may be hard to get out of bed; you may be starting to isolate yourself from others.    Symptoms of depression are starting to last most all day and this has happened for several days.     You may have suicidal thoughts but they are not constant.   What you need to do:     1. Call your care team. Your response to treatment will improve if you keep your care team informed of your progress. Yellow periods are signs an adjustment may need to be made.     2. Continue your self-care.  Just get dressed and ready for the day.  Don't give yourself time to talk yourself out of it.    3. Talk to someone in your support network.    4. Open up your Depression Self-Care Plan/Wellness Kit.           RED    ZONE Medical Alert - Get Help    What it looks like:     Depression is seriously interfering with your life.     You may experience these or other symptoms: You can t get out of bed most days, can t work or engage in other necessary activities, you have trouble taking care of basic hygiene, or basic responsibilities, thoughts of suicide or death that  will not go away, self-injurious behavior.     What you need to do:  1. Call your care team and request a same-day appointment. If they are not available (weekends or after hours) call your local crisis line, emergency room or 911.          Depression Self-Care Plan / Wellness Kit    Many people find that medication and therapy are helpful treatments for managing depression. In addition, making small changes to your everyday life can help to boost your mood and improve your wellbeing. Below are some tips for you to consider. Be sure to talk with your medical provider and/or behavioral health consultant if your symptoms are worsening or not improving.     Sleep   Sleep hygiene  means all of the habits that support good, restful sleep. It includes maintaining a consistent bedtime and wake time, using your bedroom only for sleeping or sex, and keeping the bedroom dark and free of distractions like a computer, smartphone, or television.     Develop a Healthy Routine  Maintain good hygiene. Get out of bed in the morning, make your bed, brush your teeth, take a shower, and get dressed. Don t spend too much time viewing media that makes you feel stressed. Find time to relax each day.    Exercise  Get some form of exercise every day. This will help reduce pain and release endorphins, the  feel good  chemicals in your brain. It can be as simple as just going for a walk or doing some gardening, anything that will get you moving.      Diet  Strive to eat healthy foods, including fruits and vegetables. Drink plenty of water. Avoid excessive sugar, caffeine, alcohol, and other mood-altering substances.     Stay Connected with Others  Stay in touch with friends and family members.    Manage Your Mood  Try deep breathing, massage therapy, biofeedback, or meditation. Take part in fun activities when you can. Try to find something to smile about each day.     Psychotherapy  Be open to working with a therapist if your provider  recommends it.     Medication  Be sure to take your medication as prescribed. Most anti-depressants need to be taken every day. It usually takes several weeks for medications to work. Not all medicines work for all people. It is important to follow-up with your provider to make sure you have a treatment plan that is working for you. Do not stop your medication abruptly without first discussing it with your provider.    Crisis Resources   These hotlines are for both adults and children. They and are open 24 hours a day, 7 days a week unless noted otherwise.      National Suicide Prevention Lifeline   9-792-889-QZOI (4772)      Crisis Text Line    www.crisistextline.org  Text HOME to 269186 from anywhere in the [place], anytime, about any type of crisis. A live, trained crisis counselor will receive the text and respond quickly.      [place] Lifeline for LGBTQ Youth  A national crisis intervention and suicide lifeline for LGBTQ youth under 25. Provides a safe place to talk without judgement. Call 1-211.672.4147; text START to 074565 or visit www.thetrevorproject.org to talk to a trained counselor.      For [facility] crisis numbers, visit the [facility] website at:  https://[place].gov/dhs/people-we-serve/adults/health-care/mental-health/resources/crisis-contacts.jsp

## 2022-04-06 ASSESSMENT — ANXIETY QUESTIONNAIRES: GAD7 TOTAL SCORE: 3

## 2022-04-07 ENCOUNTER — OFFICE VISIT (OUTPATIENT)
Dept: FAMILY MEDICINE | Facility: CLINIC | Age: 72
End: 2022-04-07

## 2022-04-07 VITALS
OXYGEN SATURATION: 97 % | WEIGHT: 210 LBS | SYSTOLIC BLOOD PRESSURE: 132 MMHG | HEART RATE: 66 BPM | DIASTOLIC BLOOD PRESSURE: 72 MMHG | RESPIRATION RATE: 20 BRPM | TEMPERATURE: 98 F | BODY MASS INDEX: 34.41 KG/M2

## 2022-04-07 DIAGNOSIS — L02.212 ABSCESS OF BACK: Primary | ICD-10-CM

## 2022-04-07 DIAGNOSIS — Z48.01 ENCOUNTER FOR CHANGE OR REMOVAL OF SURGICAL WOUND DRESSING: ICD-10-CM

## 2022-04-07 PROCEDURE — 99213 OFFICE O/P EST LOW 20 MIN: CPT | Performed by: STUDENT IN AN ORGANIZED HEALTH CARE EDUCATION/TRAINING PROGRAM

## 2022-04-07 NOTE — NURSING NOTE
Chief Complaint   Patient presents with     Follow Up     repacking wound on back      Pre-visit Screening:  Immunizations:  up to date  Colonoscopy:  is up to date  Mammogram: is up to date  Asthma Action Test/Plan:  NA  PHQ9:  NA  GAD7:  NA  Questioned patient about current smoking habits Pt. has never smoked.  Ok to leave detailed message on voice mail for today's visit only yes, phone # [phone]

## 2022-04-07 NOTE — PATIENT INSTRUCTIONS
Remove bandage and gauze strip in 3-4 days, then just keep covered with bandaid until healed    Follow-up with any new pain, drainage, etc

## 2022-04-07 NOTE — PROGRESS NOTES
Assessment & Plan     1. Abscess of back  2. Encounter for change or removal of surgical wound dressing  Continued improvement, clinically doing well. Cavity size continues to decrease, repacked today with small strip but pt ok to remove at home and not follow-up again unless concerns.    Ramo Zamora MD, [facility]       Subjective     Ely Pollack is a 71 year old female who presents to clinic today for the following health issues:    HPI   Chief Complaint   Patient presents with     Follow Up     repacking wound on back       Follow-up abscess. Last seen 4/1. Doing well, no fevers/chills. No pain. Minimal discharge noted on guaze. Compliant with wound cares, no drainage or redness.         Objective    /72 (BP Location: Left arm, Patient Position: Sitting, Cuff Size: Adult Large)   Pulse 66   Temp 98  F (36.7  C) (Temporal)   Resp 20   Wt 95.3 kg (210 lb)   SpO2 97%   BMI 34.41 kg/m    Body mass index is 34.41 kg/m .  Physical Exam   Alert, NAD  NC/AT  Sclerae anicteric  Regular  Resp nonlabored  No focal neuro deficits. Speech intact. Normal gait.  Appropriate affect    Skin warm and dry, no surrounding erythema now, incision with packing intact. No surrounding ttp. Area was cleansed with alcohol and iodoform guaze packing exchanged, ~2cm again used today. Tolerated well.

## 2022-07-06 ENCOUNTER — OFFICE VISIT (OUTPATIENT)
Dept: FAMILY MEDICINE | Facility: CLINIC | Age: 72
End: 2022-07-06

## 2022-07-06 VITALS
SYSTOLIC BLOOD PRESSURE: 130 MMHG | BODY MASS INDEX: 33.75 KG/M2 | OXYGEN SATURATION: 97 % | WEIGHT: 210 LBS | HEIGHT: 66 IN | DIASTOLIC BLOOD PRESSURE: 70 MMHG | HEART RATE: 62 BPM | TEMPERATURE: 97.2 F

## 2022-07-06 DIAGNOSIS — J01.00 ACUTE NON-RECURRENT MAXILLARY SINUSITIS: ICD-10-CM

## 2022-07-06 DIAGNOSIS — R09.81 NASAL CONGESTION: Primary | ICD-10-CM

## 2022-07-06 LAB — COVID-19: NEGATIVE

## 2022-07-06 PROCEDURE — 99213 OFFICE O/P EST LOW 20 MIN: CPT | Performed by: PHYSICIAN ASSISTANT

## 2022-07-06 PROCEDURE — 87635 SARS-COV-2 COVID-19 AMP PRB: CPT | Performed by: PHYSICIAN ASSISTANT

## 2022-07-06 PROCEDURE — G2023 SPECIMEN COLLECT COVID-19: HCPCS | Performed by: PHYSICIAN ASSISTANT

## 2022-07-06 NOTE — NURSING NOTE
Chief Complaint   Patient presents with     Sinus Problem     Pt has had sinus congestion and cough for two weeks.   Symptoms have gotten worse, sleeping is very uncomfortable regarding the coughing.  Pt tested neg for covid yesterday.  No fever.     Cough         Pre-visit Screening:  Immunizations:  up to date  Colonoscopy:  is up to date  Mammogram: is up to date  Asthma Action Test/Plan:  NA  PHQ9:  NA  GAD7:  NA  Questioned patient about current smoking habits Pt. has never smoked.  Ok to leave detailed message on voice mail for today's visit only Yes, phone # [phone]

## 2022-07-06 NOTE — PROGRESS NOTES
"CC: Sick    History:  2 weeks ago, developed sinus congestion and cough. As time has gone on, has gotten worse, getting drainage, headache, pressure over both cheeks.  Cough is dry, but feels like it should be productive from drainage. No teeth pain. Sleeping has been difficult due to cough. No fever, sweats, chills, body aches, sore throat.     Did take 2 negative Covid-19 at home.     Has been using generic Claritin, albuterol inhaler. Did try Zrytec in the past, which wasn't as effective. Has been trying Flonase for the past several days.     Does have asthma, that tends to be seasonal. Has been using albuterol inhaler more.    PMH, MEDICATIONS, ALLERGIES, SOCIAL AND FAMILY HISTORY in [facility] and reviewed by me personally.    ROS negative other than the symptoms noted above in the HPI.        Examination   /70 (BP Location: Left arm, Patient Position: Sitting, Cuff Size: Adult Large)   Pulse 62   Temp 97.2  F (36.2  C) (Temporal)   Ht 1.664 m (5' 5.5\")   Wt 95.3 kg (210 lb)   SpO2 97%   BMI 34.41 kg/m         Constitutional: Sitting comfortably, in no acute distress. Vital signs noted  Ears: external canals and TMs free of abnormalities  Nose: patent, without mucosal abnormalities  Mouth and throat: without erythema or lesions of the mucosa  Neck:  no adenopathy, trachea midline and normal to palpation, thyroid normal to palpation  Cardiovascular:  regular rate and rhythm, no murmurs, clicks, or gallops  Respiratory:  normal respiratory rate and rhythm, lungs clear to auscultation  SKIN: No jaundice/pallor/rash.   Psychiatric: mentation appears normal and affect normal/bright        A/P    ICD-10-CM    1. Nasal congestion  R09.81 COVID-19 (BFP)   2. Acute non-recurrent maxillary sinusitis  J01.00 amoxicillin-clavulanate (AUGMENTIN) 875-125 MG tablet       DISCUSSION:  Covid-19 test today is negative.    Given duration of symptoms, recommended she complete 10 day course of Augmentin. She should take this " twice daily with food, and should consider taking probiotic or eating yogurt in between. Warned her of possible side effects including loose stool.  Provided her with recommendations on OTC therapies based on symptoms. She should contact me in 1 week if symptoms are not improving, or sooner with any intolerable side effects or worsening symptoms.    follow up visit: As needed    Melissa Montaño PA-C  [facility]

## 2022-07-08 ENCOUNTER — TELEPHONE (OUTPATIENT)
Dept: FAMILY MEDICINE | Facility: CLINIC | Age: 72
End: 2022-07-08

## 2022-07-08 DIAGNOSIS — J01.00 ACUTE NON-RECURRENT MAXILLARY SINUSITIS: Primary | ICD-10-CM

## 2022-07-08 RX ORDER — AMOXICILLIN 875 MG
875 TABLET ORAL 2 TIMES DAILY
Qty: 20 TABLET | Refills: 0 | Status: SHIPPED | OUTPATIENT
Start: 2022-07-08 | End: 2022-07-18

## 2022-07-08 NOTE — TELEPHONE ENCOUNTER
Ely called regarding the message below.   Melissa, could you send in the amoxicillin when you get a chance? Thanks!

## 2022-07-08 NOTE — TELEPHONE ENCOUNTER
Pt called and reported diarrhea on Augmentin. Will switch to amoxicillin, and recommended Imodium, BRAT diet. Contact us next week if not improving, and could switch again if needed. Added clavulanic acid to allergies. Has done amoxicillin in the past.

## 2022-09-20 ENCOUNTER — LAB (OUTPATIENT)
Dept: LAB | Facility: CLINIC | Age: 72
End: 2022-09-20
Payer: COMMERCIAL

## 2022-09-20 DIAGNOSIS — Z96.659 PAIN DUE TO TOTAL KNEE REPLACEMENT (H): Primary | ICD-10-CM

## 2022-09-20 DIAGNOSIS — T84.84XA PAIN DUE TO TOTAL KNEE REPLACEMENT (H): Primary | ICD-10-CM

## 2022-09-20 LAB
CRP SERPL-MCNC: 4.42 MG/L
ERYTHROCYTE [SEDIMENTATION RATE] IN BLOOD BY WESTERGREN METHOD: 10 MM/HR (ref 0–30)

## 2022-09-20 PROCEDURE — 85652 RBC SED RATE AUTOMATED: CPT

## 2022-09-20 PROCEDURE — 36415 COLL VENOUS BLD VENIPUNCTURE: CPT

## 2022-09-20 PROCEDURE — 86140 C-REACTIVE PROTEIN: CPT

## 2022-10-08 DIAGNOSIS — F32.89 OTHER DEPRESSION: ICD-10-CM

## 2022-10-08 DIAGNOSIS — I10 ESSENTIAL HYPERTENSION: ICD-10-CM

## 2022-10-08 DIAGNOSIS — F41.9 ANXIETY: ICD-10-CM

## 2022-10-10 RX ORDER — BUSPIRONE HYDROCHLORIDE 15 MG/1
7.5 TABLET ORAL 2 TIMES DAILY
Qty: 90 TABLET | Refills: 1 | COMMUNITY
Start: 2022-10-10

## 2022-10-10 RX ORDER — CITALOPRAM HYDROBROMIDE 40 MG/1
TABLET ORAL
Qty: 90 TABLET | Refills: 1 | COMMUNITY
Start: 2022-10-10

## 2022-10-10 RX ORDER — HYDROCHLOROTHIAZIDE 25 MG/1
TABLET ORAL
Qty: 90 TABLET | Refills: 1 | COMMUNITY
Start: 2022-10-10

## 2022-10-10 RX ORDER — ATENOLOL 50 MG/1
TABLET ORAL
Qty: 90 TABLET | Refills: 1 | COMMUNITY
Start: 2022-10-10

## 2022-10-10 NOTE — TELEPHONE ENCOUNTER
Ely Pollack is requesting a refill of:    Refused Prescriptions:                       Disp   Refills    hydrochlorothiazide (HYDRODIURIL) 25 MG ta*90 tab*1        Sig: TAKE 1 TABLET BY MOUTH EVERY DAY  Refused By: HECTOR QUIÑONEZ  Reason for Refusal: Patient needs appointment    citalopram (CELEXA) 40 MG tablet [Pharmacy*90 tab*1        Sig: TAKE 1 TABLET BY MOUTH EVERY DAY  Refused By: HECTOR QUIÑONEZ  Reason for Refusal: Patient needs appointment    atenolol (TENORMIN) 50 MG tablet [Pharmacy*90 tab*1        Sig: TAKE 1 TABLET BY MOUTH EVERY DAY  Refused By: HECTOR QUIÑONEZ  Reason for Refusal: Patient needs appointment    busPIRone (BUSPAR) 15 MG tablet [Pharmacy *90 tab*1        Sig: TAKE 0.5 TABLETS (7.5 MG) BY MOUTH 2 TIMES DAILY  Refused By: HECTOR QUIÑONEZ  Reason for Refusal: Patient needs appointment    Pt due for OV, sent [employer] message

## 2022-10-13 ENCOUNTER — OFFICE VISIT (OUTPATIENT)
Dept: FAMILY MEDICINE | Facility: CLINIC | Age: 72
End: 2022-10-13

## 2022-10-13 VITALS
WEIGHT: 209.8 LBS | OXYGEN SATURATION: 95 % | HEART RATE: 54 BPM | DIASTOLIC BLOOD PRESSURE: 84 MMHG | HEIGHT: 68 IN | SYSTOLIC BLOOD PRESSURE: 134 MMHG | BODY MASS INDEX: 31.8 KG/M2 | TEMPERATURE: 96.4 F

## 2022-10-13 DIAGNOSIS — R73.03 PRE-DIABETES: Primary | ICD-10-CM

## 2022-10-13 DIAGNOSIS — F41.9 ANXIETY: ICD-10-CM

## 2022-10-13 DIAGNOSIS — F32.89 OTHER DEPRESSION: ICD-10-CM

## 2022-10-13 DIAGNOSIS — I10 ESSENTIAL HYPERTENSION: ICD-10-CM

## 2022-10-13 LAB
ALBUMIN SERPL-MCNC: 4 G/DL (ref 3.6–5.1)
ALBUMIN/GLOB SERPL: 1.3 {RATIO} (ref 1–2.5)
ALP SERPL-CCNC: 78 U/L (ref 33–130)
ALT 1742-6: 24 U/L (ref 0–32)
AST 1920-8: 23 U/L (ref 0–35)
BILIRUB SERPL-MCNC: 0.7 MG/DL (ref 0.2–1.2)
BUN SERPL-MCNC: 15 MG/DL (ref 7–25)
BUN/CREATININE RATIO: 19 (ref 6–22)
CALCIUM SERPL-MCNC: 9.1 MG/DL (ref 8.6–10.3)
CHLORIDE SERPLBLD-SCNC: 99.2 MMOL/L (ref 98–110)
CO2 SERPL-SCNC: 31.6 MMOL/L (ref 20–32)
CREAT SERPL-MCNC: 0.79 MG/DL (ref 0.6–1.3)
GLOBULIN, CALCULATED - QUEST: 3 (ref 1.9–3.7)
GLUCOSE SERPL-MCNC: 110 MG/DL (ref 60–99)
HBA1C MFR BLD: 5.5 % (ref 4–7)
POTASSIUM SERPL-SCNC: 4.84 MMOL/L (ref 3.5–5.3)
PROT SERPL-MCNC: 7 G/DL (ref 6.1–8.1)
SODIUM SERPL-SCNC: 137 MMOL/L (ref 135–146)

## 2022-10-13 PROCEDURE — 90662 IIV NO PRSV INCREASED AG IM: CPT | Performed by: FAMILY MEDICINE

## 2022-10-13 PROCEDURE — 99213 OFFICE O/P EST LOW 20 MIN: CPT | Mod: 25 | Performed by: FAMILY MEDICINE

## 2022-10-13 PROCEDURE — 80053 COMPREHEN METABOLIC PANEL: CPT | Performed by: FAMILY MEDICINE

## 2022-10-13 PROCEDURE — 36415 COLL VENOUS BLD VENIPUNCTURE: CPT | Performed by: FAMILY MEDICINE

## 2022-10-13 PROCEDURE — 83036 HEMOGLOBIN GLYCOSYLATED A1C: CPT | Performed by: FAMILY MEDICINE

## 2022-10-13 PROCEDURE — G0008 ADMIN INFLUENZA VIRUS VAC: HCPCS | Performed by: FAMILY MEDICINE

## 2022-10-13 RX ORDER — CITALOPRAM HYDROBROMIDE 40 MG/1
40 TABLET ORAL DAILY
Qty: 90 TABLET | Refills: 1 | Status: SHIPPED | OUTPATIENT
Start: 2022-10-13 | End: 2023-06-01

## 2022-10-13 RX ORDER — HYDROCHLOROTHIAZIDE 25 MG/1
25 TABLET ORAL DAILY
Qty: 90 TABLET | Refills: 1 | Status: SHIPPED | OUTPATIENT
Start: 2022-10-13 | End: 2023-03-07

## 2022-10-13 RX ORDER — BUSPIRONE HYDROCHLORIDE 15 MG/1
7.5 TABLET ORAL 2 TIMES DAILY
Qty: 90 TABLET | Refills: 1 | Status: SHIPPED | OUTPATIENT
Start: 2022-10-13 | End: 2023-06-14

## 2022-10-13 RX ORDER — ATENOLOL 50 MG/1
50 TABLET ORAL DAILY
Qty: 90 TABLET | Refills: 1 | Status: SHIPPED | OUTPATIENT
Start: 2022-10-13 | End: 2023-06-05

## 2022-10-13 ASSESSMENT — ASTHMA QUESTIONNAIRES: ACT_TOTALSCORE: 12

## 2022-10-13 ASSESSMENT — PATIENT HEALTH QUESTIONNAIRE - PHQ9: SUM OF ALL RESPONSES TO PHQ QUESTIONS 1-9: 1

## 2022-10-13 NOTE — NURSING NOTE
Chief Complaint   Patient presents with     Refill Request     Pt came in for a refill, and to check a1c.     Pre-visit Screening:  Immunizations:  Flu done today.  Colonoscopy:    Mammogram:   Asthma Action Test/Plan:    PHQ9:    GAD7:    Questioned patient about current smoking habits Pt. has never smoked.  Ok to leave detailed message on voice mail for today's visit only yes, phone # [phone]

## 2022-10-13 NOTE — PROGRESS NOTES
"Assessment & Plan   Problem List Items Addressed This Visit        Circulatory    Essential hypertension    Relevant Medications    atenolol (TENORMIN) 50 MG tablet    hydrochlorothiazide (HYDRODIURIL) 25 MG tablet       Behavioral    Other depression    Relevant Medications    busPIRone (BUSPAR) 15 MG tablet    citalopram (CELEXA) 40 MG tablet       Other    Anxiety    Relevant Medications    busPIRone (BUSPAR) 15 MG tablet    citalopram (CELEXA) 40 MG tablet   Other Visit Diagnoses     Pre-diabetes    -  Primary    Relevant Orders    HEMOGLOBIN A1C ([facility]) (Completed)    VENOUS COLLECTION (Completed)    Comprehensive Metobolic Panel ([facility])         1. Essential hypertension  Controlled, refilled medications, check labs.  - atenolol (TENORMIN) 50 MG tablet; Take 1 tablet (50 mg) by mouth daily  Dispense: 90 tablet; Refill: 1  - hydrochlorothiazide (HYDRODIURIL) 25 MG tablet; Take 1 tablet (25 mg) by mouth daily  Dispense: 90 tablet; Refill: 1    2. Other depression  Controlled on medications, refilled.  - busPIRone (BUSPAR) 15 MG tablet; Take 0.5 tablets (7.5 mg) by mouth 2 times daily  Dispense: 90 tablet; Refill: 1  - citalopram (CELEXA) 40 MG tablet; Take 1 tablet (40 mg) by mouth daily  Dispense: 90 tablet; Refill: 1    3. Anxiety  Controlled on medications, refilled.  - busPIRone (BUSPAR) 15 MG tablet; Take 0.5 tablets (7.5 mg) by mouth 2 times daily  Dispense: 90 tablet; Refill: 1  - citalopram (CELEXA) 40 MG tablet; Take 1 tablet (40 mg) by mouth daily  Dispense: 90 tablet; Refill: 1    4. Pre-diabetes  This has improved. Continue diet and exercise changes, recheck in 6 months.  - HEMOGLOBIN A1C ([facility])  - VENOUS COLLECTION  - Comprehensive Metobolic Panel ([facility])           BMI:   Estimated body mass index is 31.9 kg/m  as calculated from the following:    Height as of this encounter: 1.727 m (5' 8\").    Weight as of this encounter: 95.2 kg (209 lb 12.8 oz).         FUTURE APPOINTMENTS:       - Follow-up visit in 6 " "months    No follow-ups on file.    Noemi Kirkpatrick MD  [facility]    Subjective     Nursing Notes:   China Vasques CMA  10/13/2022 10:06 AM  Signed  Chief Complaint   Patient presents with     Refill Request     Pt came in for a refill, and to check a1c.     Pre-visit Screening:  Immunizations:  Flu done today.  Colonoscopy:    Mammogram:   Asthma Action Test/Plan:    PHQ9:    GAD7:    Questioned patient about current smoking habits Pt. has never smoked.  Ok to leave detailed message on voice mail for today's visit only yes, phone # [phone]             Ely Pollack is a 72 year old female who presents to clinic today for the following health issues   HPI     Here to followup on blood pressure. Isn't checking at home.   Only if she feels stressed out. Trying to do mediation  Borderline diabetes--watching this, not on medications.  Depression--is doing very well on the medications.doesn't feel like she has to fight this anymore.          Review of Systems   Constitutional, HEENT, cardiovascular, pulmonary, gi and gu systems are negative, except as otherwise noted.      Objective    /84 (BP Location: Left arm, Patient Position: Sitting, Cuff Size: Adult Large)   Pulse 54   Temp (!) 96.4  F (35.8  C) (Temporal)   Ht 1.727 m (5' 8\")   Wt 95.2 kg (209 lb 12.8 oz)   SpO2 95%   BMI 31.90 kg/m    Body mass index is 31.9 kg/m .  Physical Exam   GENERAL: healthy, alert and no distress  RESP: lungs clear to auscultation - no rales, rhonchi or wheezes  CV: regular rate and rhythm, normal S1 S2, no S3 or S4, no murmur, click or rub, no peripheral edema and peripheral pulses strong  MS: no gross musculoskeletal defects noted, no edema  NEURO: Normal strength and tone, mentation intact and speech normal  PSYCH: mentation appears normal, affect normal/bright    Results for orders placed or performed in visit on 10/13/22   HEMOGLOBIN A1C ([facility])     Status: None   Result Value Ref Range    " Hemoglobin A1C 5.5 4.0 - 7.0 %

## 2022-10-19 ENCOUNTER — HOSPITAL ENCOUNTER (INPATIENT)
Facility: CLINIC | Age: 72
Setting detail: SURGERY ADMIT
End: 2022-10-19
Attending: ORTHOPAEDIC SURGERY | Admitting: ORTHOPAEDIC SURGERY
Payer: COMMERCIAL

## 2022-12-01 RX ORDER — LEVOCETIRIZINE DIHYDROCHLORIDE 5 MG/1
5 TABLET, FILM COATED ORAL EVERY EVENING
COMMUNITY
End: 2022-12-09 | Stop reason: HOSPADM

## 2022-12-01 RX ORDER — ALBUTEROL SULFATE 90 UG/1
2 AEROSOL, METERED RESPIRATORY (INHALATION) EVERY 6 HOURS PRN
COMMUNITY
End: 2022-12-02

## 2022-12-01 NOTE — PROVIDER NOTIFICATION
12/01/22 1030   Living Arrangements   Number of Stairs, Within Home, Primary greater than 10 stairs   Stair Railings, Within Home, Primary railings safe and in good condition   Once home, are you able to live on one level? Yes   Bathroom Shower/Tub Walk-in shower   Support System   Do you have someone available to stay with you one or two nights once you are home? Yes   Education   Patient attended total joint pre-op class/received pre-op teaching  online

## 2022-12-02 ENCOUNTER — OFFICE VISIT (OUTPATIENT)
Dept: FAMILY MEDICINE | Facility: CLINIC | Age: 72
End: 2022-12-02

## 2022-12-02 DIAGNOSIS — U07.1 INFECTION DUE TO 2019 NOVEL CORONAVIRUS: Primary | ICD-10-CM

## 2022-12-02 DIAGNOSIS — I10 ESSENTIAL HYPERTENSION: ICD-10-CM

## 2022-12-02 DIAGNOSIS — J45.20 INTERMITTENT ASTHMA, UNSPECIFIED ASTHMA SEVERITY, UNSPECIFIED WHETHER COMPLICATED: ICD-10-CM

## 2022-12-02 PROCEDURE — 99213 OFFICE O/P EST LOW 20 MIN: CPT | Performed by: STUDENT IN AN ORGANIZED HEALTH CARE EDUCATION/TRAINING PROGRAM

## 2022-12-02 RX ORDER — ALBUTEROL SULFATE 90 UG/1
2 AEROSOL, METERED RESPIRATORY (INHALATION) EVERY 6 HOURS PRN
Qty: 18 G | Refills: 3 | Status: SHIPPED | OUTPATIENT
Start: 2022-12-02

## 2022-12-02 NOTE — NURSING NOTE
Chief Complaint   Patient presents with     Covid Concern     Tested positive for COVID yesterday, has had a cough for awhile, started having a severe headache over this last weekend     I called the patient to review her chart over the phone to make sure everything is up to date.

## 2022-12-02 NOTE — PROGRESS NOTES
Assessment & Plan       ICD-10-CM    1. Infection due to 2019 novel coronavirus  U07.1 nirmatrelvir and ritonavir (PAXLOVID) therapy pack      2. Essential hypertension  I10       3. Intermittent asthma, unspecified asthma severity, unspecified whether complicated  J45.20 albuterol (PROAIR HFA/PROVENTIL HFA/VENTOLIN HFA) 108 (90 Base) MCG/ACT inhaler          Positive home covid test, meets criteria for paxlovid, r/b/a reviewed. Continue albuterol prn. Discussed supportive cares and reasons to follow-up or seek emergent care.     Ramo Zamora MD, [facility]     _______________________    Ely Pollack is a 72 year old female who is being evaluated via a billable video visit.      How would you like to obtain your AVS? MyChart  Will anyone else be joining your video visit? No      Subjective     Ely Pollack is a 72 year old female who presents today for the following   health issues:    Chief Complaint   Patient presents with     Covid Concern     Tested positive for COVID yesterday, has had a cough for awhile, started having a severe headache over this last weekend       Sx really started Tuesday night/Wed morning. Worsening cough and headache. HA resolved. Cough worse at night. +fatigue.   Home O2 readings 96%.   Using albuterol a few times daily  Has had 3 doses of covid vaccine  Also has new dog, wondering if element of allergies.   No CP, hemoptysis or LE edema.    Objective   There were no vitals taken for this visit.  There is no height or weight on file to calculate BMI.    Physical Exam  GENERAL: Healthy, alert and no distress  EYES: Eyes grossly normal to inspection.  No discharge or erythema, or obvious scleral/conjunctival abnormalities.  RESP: occas dry cough. No audible wheeze or visible cyanosis.  No visible retractions or increased work of breathing.    SKIN: Visible skin clear. No significant rash, abnormal pigmentation or lesions.  NEURO: Cranial nerves grossly  intact.  Mentation and speech appropriate for age.  PSYCH: Mentation appears normal, affect normal/bright, judgement and insight intact, normal speech and appearance well-groomed.      Office Visit on 10/13/2022   Component Date Value Ref Range Status     Hemoglobin A1C 10/13/2022 5.5  4.0 - 7.0 % Final     Carbon Dioxide 10/13/2022 31.6  20 - 32 mmol/L Final     Creatinine 10/13/2022 0.79  0.60 - 1.30 mg/dL Final     Glucose 10/13/2022 110 (A)  60 - 99 mg/dL Final     Sodium 10/13/2022 137.0  135 - 146 mmol/L Final     Potassium 10/13/2022 4.84  3.5 - 5.3 mmol/L Final     Chloride 10/13/2022 99.2  98 - 110 mmol/L Final     Protein Total 10/13/2022 7.0  6.1 - 8.1 g/dL Final     Albumin 10/13/2022 4.0  3.6 - 5.1 g/dL Final     Alkaline Phosphatase 10/13/2022 78  33 - 130 U/L Final     ALT 10/13/2022 24  0 - 32 U/L Final     AST 10/13/2022 23  0 - 35 U/L Final     Bilirubin Total 10/13/2022 0.7  0.2 - 1.2 mg/dL Final     Urea Nitrogen 10/13/2022 15  7 - 25 mg/dL Final     Calcium 10/13/2022 9.1  8.6 - 10.3 mg/dL Final     BUN/Creatinine Ratio 10/13/2022 19.0  6 - 22 Final     Globulin Calculated 10/13/2022 3.0  1.9 - 3.7 Final     A/G Ratio 10/13/2022 1.3  1 - 2.5 Final              Video-Visit Details    Type of service:  Video Visit    Video Start Time: 10:31 AM  Video End Time: 10:53 AM    Originating Location (pt. Location): Home in [place]    Distant Location (provider location):  On-site    Platform used for Video Visit: Sonya

## 2022-12-06 ENCOUNTER — OFFICE VISIT (OUTPATIENT)
Dept: FAMILY MEDICINE | Facility: CLINIC | Age: 72
End: 2022-12-06

## 2022-12-06 DIAGNOSIS — J45.21 MILD INTERMITTENT ASTHMA WITH ACUTE EXACERBATION: ICD-10-CM

## 2022-12-06 DIAGNOSIS — U07.1 INFECTION DUE TO 2019 NOVEL CORONAVIRUS: Primary | ICD-10-CM

## 2022-12-06 PROCEDURE — 99213 OFFICE O/P EST LOW 20 MIN: CPT | Mod: GT | Performed by: STUDENT IN AN ORGANIZED HEALTH CARE EDUCATION/TRAINING PROGRAM

## 2022-12-06 RX ORDER — ALBUTEROL SULFATE 0.83 MG/ML
2.5 SOLUTION RESPIRATORY (INHALATION) EVERY 6 HOURS PRN
Qty: 90 ML | Refills: 3 | Status: SHIPPED | OUTPATIENT
Start: 2022-12-06

## 2022-12-06 RX ORDER — PREDNISONE 20 MG/1
40 TABLET ORAL
Qty: 10 TABLET | Refills: 0 | Status: SHIPPED | OUTPATIENT
Start: 2022-12-06 | End: 2022-12-11

## 2022-12-06 RX ORDER — CODEINE PHOSPHATE AND GUAIFENESIN 10; 100 MG/5ML; MG/5ML
1-2 SOLUTION ORAL
Qty: 118 ML | Refills: 0 | Status: SHIPPED | OUTPATIENT
Start: 2022-12-06 | End: 2022-12-09

## 2022-12-06 NOTE — PROGRESS NOTES
Assessment & Plan       ICD-10-CM    1. Infection due to 2019 novel coronavirus  U07.1 guaiFENesin-codeine (ROBITUSSIN AC) 100-10 MG/5ML solution      2. Mild intermittent asthma with acute exacerbation  J45.21 Nebulizer and Supplies Order for DME - ONLY FOR DME     predniSONE (DELTASONE) 20 MG tablet     albuterol (PROVENTIL) (2.5 MG/3ML) 0.083% neb solution         Completing paxlovid and feeling better except for cough. Given asthma hx and upcoming surgery pt would like to avoid postponing, will treat exacerbation aggressively as above.    Ramo Zamora MD, [facility]       Ely Pollack is a 72 year old female who is being evaluated via a billable video visit.      How would you like to obtain your AVS? MyChart       Subjective     Ely Pollack is a 72 year old female who presents today for the following   health issues:    Chief Complaint   Patient presents with     Covid Concern     Has COVID and being treated with Paxlovid but still has some concerns with sleeping due to coughing a lot at night, wants to talk about options for this, did start using a nebulizer and this has helped a little, wants to be prescribed the albuterol solution for this       Feeling better overall except cough. Started paxlovid 2/2.   Keeps her awake at night  Intermittent wheezing  O2 96+ at home.   No CP or palpitations.   No change in sputum.  Using family members nebulizer w good relief.     Objective   There were no vitals taken for this visit.  There is no height or weight on file to calculate BMI.    Physical Exam  GENERAL: Healthy, alert and no distress  EYES: Eyes grossly normal to inspection.  No discharge or erythema, or obvious scleral/conjunctival abnormalities.  RESP: occas harsh cough No audible wheeze, or visible cyanosis.  No visible retractions or increased work of breathing.    SKIN: Visible skin clear. No significant rash, abnormal pigmentation or lesions.  NEURO: Cranial nerves  grossly intact.  Mentation and speech appropriate for age.  PSYCH: Mentation appears normal, affect normal/bright, judgement and insight intact, normal speech and appearance well-groomed.        Video-Visit Details    Type of service:  Video Visit    Video visit duration: 15 min, additional 7 min documentation and chart review    Originating Location (pt. Location): Home in [place]    Distant Location (provider location):  On-site    Platform used for Video Visit: [employer]

## 2022-12-06 NOTE — NURSING NOTE
Chief Complaint   Patient presents with     Covid Concern     Has COVID and being treated with Paxlovid but still has some concerns with sleeping due to coughing a lot at night, wants to talk about options for this, did start using a nebulizer and this has helped a little, wants to be prescribed the albuterol solution for this      I called the patient to review her chart over the phone and make sure that we have everything up to date.

## 2022-12-07 RX ORDER — CEFAZOLIN SODIUM/WATER 2 G/20 ML
2 SYRINGE (ML) INTRAVENOUS SEE ADMIN INSTRUCTIONS
Status: CANCELLED | OUTPATIENT
Start: 2022-12-07

## 2022-12-07 RX ORDER — TRANEXAMIC ACID 650 MG/1
1950 TABLET ORAL ONCE
Status: CANCELLED | OUTPATIENT
Start: 2022-12-07 | End: 2022-12-07

## 2022-12-07 RX ORDER — CEFAZOLIN SODIUM/WATER 2 G/20 ML
2 SYRINGE (ML) INTRAVENOUS
Status: CANCELLED | OUTPATIENT
Start: 2022-12-07

## 2022-12-09 ENCOUNTER — OFFICE VISIT (OUTPATIENT)
Dept: FAMILY MEDICINE | Facility: CLINIC | Age: 72
End: 2022-12-09

## 2022-12-09 VITALS
DIASTOLIC BLOOD PRESSURE: 80 MMHG | HEART RATE: 60 BPM | WEIGHT: 201.2 LBS | OXYGEN SATURATION: 98 % | TEMPERATURE: 98.5 F | SYSTOLIC BLOOD PRESSURE: 130 MMHG | BODY MASS INDEX: 32.33 KG/M2 | HEIGHT: 66 IN

## 2022-12-09 DIAGNOSIS — J45.20 MILD INTERMITTENT ASTHMA WITHOUT COMPLICATION: ICD-10-CM

## 2022-12-09 DIAGNOSIS — G89.29 CHRONIC BACK PAIN, UNSPECIFIED BACK LOCATION, UNSPECIFIED BACK PAIN LATERALITY: ICD-10-CM

## 2022-12-09 DIAGNOSIS — Z01.818 PREOPERATIVE EXAMINATION: Primary | ICD-10-CM

## 2022-12-09 DIAGNOSIS — I10 ESSENTIAL HYPERTENSION: ICD-10-CM

## 2022-12-09 DIAGNOSIS — F41.9 ANXIETY: ICD-10-CM

## 2022-12-09 DIAGNOSIS — Z91.89 AT HIGH RISK FOR DEEP VENOUS THROMBOSIS: ICD-10-CM

## 2022-12-09 DIAGNOSIS — Z91.030 BEE STING ALLERGY: ICD-10-CM

## 2022-12-09 DIAGNOSIS — U07.1 COVID-19: ICD-10-CM

## 2022-12-09 DIAGNOSIS — M25.561 CHRONIC PAIN OF RIGHT KNEE: ICD-10-CM

## 2022-12-09 DIAGNOSIS — F32.89 OTHER DEPRESSION: ICD-10-CM

## 2022-12-09 DIAGNOSIS — M54.9 CHRONIC BACK PAIN, UNSPECIFIED BACK LOCATION, UNSPECIFIED BACK PAIN LATERALITY: ICD-10-CM

## 2022-12-09 DIAGNOSIS — G89.29 CHRONIC PAIN OF RIGHT KNEE: ICD-10-CM

## 2022-12-09 DIAGNOSIS — G89.29 CHRONIC NECK PAIN: ICD-10-CM

## 2022-12-09 DIAGNOSIS — M54.2 CHRONIC NECK PAIN: ICD-10-CM

## 2022-12-09 LAB
% GRANULOCYTES: 82 %
BUN SERPL-MCNC: 16 MG/DL (ref 7–25)
BUN/CREATININE RATIO: 20.5 (ref 6–22)
CALCIUM SERPL-MCNC: 9.3 MG/DL (ref 8.6–10.3)
CHLORIDE SERPLBLD-SCNC: 89.6 MMOL/L (ref 98–110)
CO2 SERPL-SCNC: 30.9 MMOL/L (ref 20–32)
CREAT SERPL-MCNC: 0.78 MG/DL (ref 0.6–1.3)
GLUCOSE SERPL-MCNC: 109 MG/DL (ref 60–99)
HCT VFR BLD AUTO: 41 % (ref 35–47)
HEMOGLOBIN: 13.6 G/DL (ref 11.7–15.7)
LYMPHOCYTES NFR BLD AUTO: 11.3 %
MCH RBC QN AUTO: 27.9 PG (ref 26–33)
MCHC RBC AUTO-ENTMCNC: 33.2 G/DL (ref 31–36)
MCV RBC AUTO: 84 FL (ref 78–100)
MONOCYTES NFR BLD AUTO: 6.7 %
PLATELET COUNT - QUEST: 304 10^9/L (ref 150–375)
POTASSIUM SERPL-SCNC: 3.85 MMOL/L (ref 3.5–5.3)
RBC # BLD AUTO: 4.88 10*12/L (ref 3.8–5.2)
SODIUM SERPL-SCNC: 130.5 MMOL/L (ref 135–146)
WBC # BLD AUTO: 9.7 10*9/L (ref 4–11)

## 2022-12-09 PROCEDURE — 36415 COLL VENOUS BLD VENIPUNCTURE: CPT | Performed by: FAMILY MEDICINE

## 2022-12-09 PROCEDURE — 93000 ELECTROCARDIOGRAM COMPLETE: CPT | Performed by: FAMILY MEDICINE

## 2022-12-09 PROCEDURE — 85025 COMPLETE CBC W/AUTO DIFF WBC: CPT | Performed by: FAMILY MEDICINE

## 2022-12-09 PROCEDURE — 99215 OFFICE O/P EST HI 40 MIN: CPT | Mod: 25 | Performed by: FAMILY MEDICINE

## 2022-12-09 PROCEDURE — 80048 BASIC METABOLIC PNL TOTAL CA: CPT | Performed by: FAMILY MEDICINE

## 2022-12-09 NOTE — PROGRESS NOTES
[facility]  [address]  [address]  [facility] [address]  Phone: [phone]  Fax: [phone]  Primary Provider: Noemi Kirkpatrick  Pre-op Performing Provider: NOEMI KIRKPATRICK      PREOPERATIVE EVALUATION:  Today's date: 12/9/2022    Ely Pollack is a 72 year old female who presents for a preoperative evaluation.     Surgical Information:  Surgery/Procedure: Revision right total arthropasty, polyethylene exchange versus total component revision for aseptic loosening  Surgery Location: [facility]   Surgeon: Dr. Lindsay  Surgery Date: 12/14/22  Time of Surgery: PM  Where patient plans to recover: Other: One night stay, then home to recover   Fax number for surgical facility: Note does not need to be faxed, will be available electronically in Epic.    Type of Anesthesia Anticipated: to be determined    Assessment & Plan     The proposed surgical procedure is considered INTERMEDIATE risk.    Problem List Items Addressed This Visit        Nervous and Auditory    Chronic back pain, unspecified back location, unspecified back pain laterality    Chronic neck pain       Respiratory    Mild intermittent asthma without complication       Circulatory    Essential hypertension       Behavioral    Other depression       Other    Anxiety    Bee sting allergy   Other Visit Diagnoses     Preoperative examination    -  Primary    Relevant Orders    VENOUS COLLECTION (Completed)    Basic Metabolic Panel (BFP) (Completed)    HEMOGRAM PLATELET DIFF (BFP) (Completed)    EKG 12-lead complete w/read - Clinics (Completed)    Chronic pain of right knee        COVID-19        At high risk for deep venous thrombosis            1. Preoperative examination    - VENOUS COLLECTION  - Basic Metabolic Panel (BFP)  - HEMOGRAM PLATELET DIFF (BFP)  - EKG 12-lead complete w/read - Clinics    2. Chronic pain of right knee      3. Essential hypertension  Controlled.    4. Mild intermittent asthma without  complication  Controlled.    5. Chronic neck pain  Improved.    6. Chronic back pain, unspecified back location, unspecified back pain laterality  Improved.    7. Other depression  Stable.    8. Anxiety  Controlled.    9. Bee sting allergy      10. COVID-19  Recent infection. Was on paxlovid. Is at risk for rebound covid infection.    11. At high risk for deep venous thrombosis  Is at risk for dvt due to recent covid infection, is off her aspirin for the surgery and will have knee surgery.  Discussed with surgeon, will postpone surgery until at least after a month after covid infection.  DVT prophylaxis.      Risks and Recommendations:  The patient has the following additional risks and recommendations for perioperative complications:   - No identified additional risk factors other than previously addressed    Medication Instructions:  Patient is to take all scheduled medications on the day of surgery EXCEPT for modifications listed below:  aspirin, hold prior    RECOMMENDATION:  Surgery postponed due to recent covid infection. Discussed with surgeon        Subjective     HPI related to upcoming procedure: here for preop for right knee replacement. Fell a couple of times due to implant. Has had knee surgery before. Bothering her for 3 years, getting worse.    1. No - Have you ever had a heart attack or stroke?  2. No - Have you ever had surgery on your heart or blood vessels, such as a stent, coronary (heart) bypass, or surgery on an artery in the head, neck, heart, or legs?  3. No - Do you have chest pain when you are physically active?  4. No - Do you have a history of heart failure?  5. Yes - Do you currently have a cold, bronchitis, or symptoms of other respiratory (head and chest) infections? Had covid last week, started paxlovid 8 days ago. Was also on prednisone, will finish this tomorrow.  6. No - Do you have a cough, shortness of breath, or wheezing?  7. No - Do you or anyone in your family have a history  of blood clots?  8. No - Do you or anyone in your family have a serious bleeding problem, such as long-lasting bleeding after surgeries or cuts?  9. No - Have you ever had anemia or been told to take iron pills?  10. No - Have you had any abnormal blood loss such as black, tarry or bloody stools, or abnormal vaginal bleeding?  11. No - Have you ever had a blood transfusion?  12. Yes - Are you willing to have a blood transfusion if it is medically needed before, during, or after your surgery?  13. No - Have you or anyone in your family ever had problems with anesthesia (sedation for surgery)?  14. No - Do you have sleep apnea, excessive snoring, or daytime drowsiness?   15. No - Do you have any artifical heart valves or other implanted medical devices, such as a pacemaker, defibrillator, or continuous glucose monitor?  16. Yes - Do you have any artifical joints? both knees and spinal fusion  17. No - Are you allergic to latex?  18. No - Is there any chance that you may be pregnant?    Health Care Directive:  Patient does not have a Health Care Directive or Living Will: Discussed advance care planning with patient; information given to patient to review.    Preoperative Review of :   reviewed - cough medication with codeine, no longer taking this      Status of Chronic Conditions:  ASTHMA - Patient has a longstanding history of moderate-severe Asthma . Patient has been doing well overall noting NO SYMPTOMS and continues on medication regimen consisting of daily inhalers without adverse reactions or side effects.     DEPRESSION - Patient has a long history of Depression of moderate severity requiring medication for control with recent symptoms being stable..Current symptoms of depression include none.     HYPERTENSION - Patient has longstanding history of HTN , currently denies any symptoms referable to elevated blood pressure. Specifically denies chest pain, palpitations, dyspnea, orthopnea, PND or peripheral  edema. Blood pressure readings have been in normal range. Current medication regimen is as listed below. Patient denies any side effects of medication.       Review of Systems  CONSTITUTIONAL: NEGATIVE for fever, chills, change in weight  INTEGUMENTARY/SKIN: NEGATIVE for worrisome rashes, moles or lesions  EYES: NEGATIVE for vision changes or irritation  ENT/MOUTH: NEGATIVE for ear, mouth and throat problems  RESP: NEGATIVE for significant cough or SOB  CV: NEGATIVE for chest pain, palpitations or peripheral edema  GI: NEGATIVE for nausea, abdominal pain, heartburn, or change in bowel habits  : NEGATIVE for frequency, dysuria, or hematuria  MUSCULOSKELETAL: NEGATIVE for significant arthralgias or myalgia  NEURO: NEGATIVE for weakness, dizziness or paresthesias  ENDOCRINE: NEGATIVE for temperature intolerance, skin/hair changes  HEME: NEGATIVE for bleeding problems  PSYCHIATRIC: NEGATIVE for changes in mood or affect    Patient Active Problem List    Diagnosis Date Noted     Nuclear sclerotic cataract, bilateral 05/04/2021     Priority: Medium     Essential hypertension 02/12/2021     Priority: Medium     Anxiety 02/12/2021     Priority: Medium     Other depression 02/12/2021     Priority: Medium     Chronic back pain, unspecified back location, unspecified back pain laterality 02/12/2021     Priority: Medium     Bee sting allergy 02/12/2021     Priority: Medium     Leg cramps 02/12/2021     Priority: Medium     Chronic neck pain 02/12/2021     Priority: Medium     Cervical radiculopathy at C7 12/13/2020     Priority: Medium     Choroidal nevus, right eye 11/26/2019     Priority: Medium     Degenerative drusen, bilateral 11/26/2019     Priority: Medium     Dermatochalasis of both upper eyelids 11/26/2019     Priority: Medium     Presbyopia 11/26/2019     Priority: Medium     Regular astigmatism, bilateral 11/26/2019     Priority: Medium     Squamous cell carcinoma of lower leg, left 12/06/2017     Priority: Medium      History of squamous cell carcinoma of skin 11/02/2017     Priority: Medium     Formatting of this note might be different from the original.  SCC, left mid lateral lower leg, s/p shave biopsy 10/20/2017 at outside clinic, awaiting treatment       Major depressive disorder, recurrent episode, moderate (H) 09/29/2017     Priority: Medium     History of bilateral knee arthroplasty 03/29/2016     Priority: Medium     Mild intermittent asthma without complication 12/12/2014     Priority: Medium     Degenerative arthritis of lumbar spine 12/12/2014     Priority: Medium      Past Medical History:   Diagnosis Date     Arthritis      Hypertension     Cardiology No     Other chronic pain     Pain for 10 years     PONV (postoperative nausea and vomiting)      Uncomplicated asthma      Past Surgical History:   Procedure Laterality Date     ARTHROPLASTY KNEE BILATERAL Bilateral 3/29/2016    Procedure: ARTHROPLASTY KNEE BILATERAL;  Surgeon: Darryn Cortez MD;  Location: [facility] OR     AS DECOMPRESS SPINAL CORD,1 SEG      Thorasic     AS SPINAL FUSION,ANT,EA ADNL LEVEL      Thorasic     BUNIONECTOMY Left      Current Outpatient Medications   Medication Sig Dispense Refill     albuterol (PROAIR HFA/PROVENTIL HFA/VENTOLIN HFA) 108 (90 Base) MCG/ACT inhaler Inhale 2 puffs into the lungs every 6 hours as needed for shortness of breath / dyspnea or wheezing 18 g 3     albuterol (PROVENTIL) (2.5 MG/3ML) 0.083% neb solution Take 1 vial (2.5 mg) by nebulization every 6 hours as needed for shortness of breath / dyspnea or wheezing 90 mL 3     atenolol (TENORMIN) 50 MG tablet Take 1 tablet (50 mg) by mouth daily 90 tablet 1     busPIRone (BUSPAR) 15 MG tablet Take 0.5 tablets (7.5 mg) by mouth 2 times daily 90 tablet 1     citalopram (CELEXA) 40 MG tablet Take 1 tablet (40 mg) by mouth daily 90 tablet 1     EPINEPHrine (ANY BX GENERIC EQUIV) 0.3 MG/0.3ML injection 2-pack Inject 0.3 mg into the muscle as needed for anaphylaxis         "hydrochlorothiazide (HYDRODIURIL) 25 MG tablet Take 1 tablet (25 mg) by mouth daily 90 tablet 1     KLOR-CON 20 MEQ CR tablet Take 1 tablet (20 mEq) by mouth daily 90 tablet 1     MAGNESIUM GLUCONATE  mg daily       aspirin (ASA) 81 MG chewable tablet 2 tablets daily         Allergies   Allergen Reactions     Bee Venom Anaphylaxis     Clavulanic Acid Diarrhea     Oxycodone Nausea and Vomiting     Does ok with 1/2 pill     Seasonal Allergies      Latex Rash        Social History     Tobacco Use     Smoking status: Never     Smokeless tobacco: Never   Substance Use Topics     Alcohol use: Yes     Comment: 3 drinks weekly     No family history on file.  History   Drug Use No         Objective     /80 (BP Location: Right arm, Patient Position: Sitting, Cuff Size: Adult Large)   Pulse 60   Temp 98.5  F (36.9  C) (Temporal)   Ht 1.664 m (5' 5.5\")   Wt 91.3 kg (201 lb 3.2 oz)   SpO2 98%   BMI 32.97 kg/m      Physical Exam    GENERAL APPEARANCE: healthy, alert and no distress     EYES: EOMI, PERRL     HENT: ear canals and TM's normal and nose and mouth without ulcers or lesions     NECK: no adenopathy, no asymmetry, masses, or scars and thyroid normal to palpation     RESP: lungs clear to auscultation - no rales, rhonchi or wheezes     CV: regular rates and rhythm, normal S1 S2, no S3 or S4 and no murmur, click or rub     ABDOMEN:  soft, nontender, no HSM or masses and bowel sounds normal     MS: extremities normal- no gross deformities noted, no evidence of inflammation in joints, FROM in all extremities.     SKIN: no suspicious lesions or rashes     NEURO: Normal strength and tone, sensory exam grossly normal, mentation intact and speech normal     PSYCH: mentation appears normal. and affect normal/bright     LYMPHATICS: No cervical adenopathy    Recent Labs   Lab Test 10/13/22  1258 10/13/22  1018 04/05/22  1216 04/05/22  1148   .0  --   --  134.4*   POTASSIUM 4.84  --   --  4.62   CR 0.79  --   " --  0.70   A1C  --  5.5 5.9  --         Diagnostics:  Recent Results (from the past 168 hour(s))   HEMOGRAM PLATELET DIFF (BFP)    Collection Time: 12/09/22 12:00 AM   Result Value Ref Range    WBC 9.7 4.0 - 11 10*9/L    RBC Count 4.88 3.8 - 5.2 10*12/L    Hemoglobin 13.6 11.7 - 15.7 g/dL    Hematocrit 41.0 35.0 - 47.0 %    MCV 84.0 78 - 100 fL    MCH 27.9 26 - 33 pg    MCHC 33.2 31 - 36 g/dL    Platelet Count 304 150 - 375 10^9/L    % Granulocytes 82.0 %    % Lymphocytes 11.3 %    % Monocytes 6.7 %   Basic Metabolic Panel (BFP)    Collection Time: 12/09/22  1:34 PM   Result Value Ref Range    Carbon Dioxide 30.9 20 - 32 mmol/L    Creatinine 0.78 0.60 - 1.30 mg/dL    Glucose 109 (A) 60 - 99 mg/dL    Sodium 130.5 (A) 135 - 146 mmol/L    Potassium 3.85 3.5 - 5.3 mmol/L    Chloride 89.6 (A) 98 - 110 mmol/L    Urea Nitrogen 16 7 - 25 mg/dL    Calcium 9.3 8.6 - 10.3 mg/dL    BUN/Creatinine Ratio 20.5 6 - 22      EKG--discussed with cardiologist on call. p axis abnormal but otherwise normal, could be artifact. Not concerning for surgery    Revised Cardiac Risk Index (RCRI):  The patient has the following serious cardiovascular risks for perioperative complications:   - No serious cardiac risks = 0 points     RCRI Interpretation: 0 points: Class I (very low risk - 0.4% complication rate)           Signed Electronically by: Noemi Kirkpatrick MD  Copy of this evaluation report is provided to requesting physician.

## 2022-12-09 NOTE — NURSING NOTE
Chief Complaint   Patient presents with     Pre-Op Exam     Surgery/Procedure: Revision right total arthropasty, polyethylene exchange versus total component revision for aseptic loosening  Surgery Location: [facility]   Surgeon: Dr. Lindsay  Surgery Date: 12/14/22  Time of Surgery: PM

## 2022-12-12 ENCOUNTER — HOSPITAL ENCOUNTER (INPATIENT)
Facility: CLINIC | Age: 72
Setting detail: SURGERY ADMIT
End: 2022-12-12
Attending: ORTHOPAEDIC SURGERY | Admitting: ORTHOPAEDIC SURGERY
Payer: COMMERCIAL

## 2023-01-04 PROBLEM — F32.89 OTHER DEPRESSION: Status: RESOLVED | Noted: 2021-02-12 | Resolved: 2023-01-04

## 2023-01-06 ENCOUNTER — OFFICE VISIT (OUTPATIENT)
Dept: FAMILY MEDICINE | Facility: CLINIC | Age: 73
End: 2023-01-06

## 2023-01-06 VITALS
HEIGHT: 66 IN | TEMPERATURE: 97.7 F | OXYGEN SATURATION: 98 % | DIASTOLIC BLOOD PRESSURE: 78 MMHG | HEART RATE: 59 BPM | WEIGHT: 203.4 LBS | BODY MASS INDEX: 32.69 KG/M2 | SYSTOLIC BLOOD PRESSURE: 126 MMHG

## 2023-01-06 DIAGNOSIS — M54.9 CHRONIC BACK PAIN, UNSPECIFIED BACK LOCATION, UNSPECIFIED BACK PAIN LATERALITY: ICD-10-CM

## 2023-01-06 DIAGNOSIS — G89.29 CHRONIC BACK PAIN, UNSPECIFIED BACK LOCATION, UNSPECIFIED BACK PAIN LATERALITY: ICD-10-CM

## 2023-01-06 DIAGNOSIS — M25.561 CHRONIC PAIN OF RIGHT KNEE: ICD-10-CM

## 2023-01-06 DIAGNOSIS — F33.1 MAJOR DEPRESSIVE DISORDER, RECURRENT EPISODE, MODERATE (H): ICD-10-CM

## 2023-01-06 DIAGNOSIS — I10 ESSENTIAL HYPERTENSION: ICD-10-CM

## 2023-01-06 DIAGNOSIS — J45.20 MILD INTERMITTENT ASTHMA WITHOUT COMPLICATION: ICD-10-CM

## 2023-01-06 DIAGNOSIS — Z01.818 PREOPERATIVE EXAMINATION: Primary | ICD-10-CM

## 2023-01-06 DIAGNOSIS — G89.29 CHRONIC PAIN OF RIGHT KNEE: ICD-10-CM

## 2023-01-06 DIAGNOSIS — E87.1 HYPONATREMIA: ICD-10-CM

## 2023-01-06 LAB
% GRANULOCYTES: 64.9 %
BUN SERPL-MCNC: 13 MG/DL (ref 7–25)
BUN/CREATININE RATIO: 17.1 (ref 6–22)
CALCIUM SERPL-MCNC: 9.3 MG/DL (ref 8.6–10.3)
CHLORIDE SERPLBLD-SCNC: 95.9 MMOL/L (ref 98–110)
CO2 SERPL-SCNC: 31.6 MMOL/L (ref 20–32)
CREAT SERPL-MCNC: 0.76 MG/DL (ref 0.6–1.3)
GLUCOSE SERPL-MCNC: 96 MG/DL (ref 60–99)
HCT VFR BLD AUTO: 43 % (ref 35–47)
HEMOGLOBIN: 13.8 G/DL (ref 11.7–15.7)
LYMPHOCYTES NFR BLD AUTO: 25.2 %
MCH RBC QN AUTO: 27.5 PG (ref 26–33)
MCHC RBC AUTO-ENTMCNC: 32.1 G/DL (ref 31–36)
MCV RBC AUTO: 85.9 FL (ref 78–100)
MONOCYTES NFR BLD AUTO: 9.9 %
PLATELET COUNT - QUEST: 257 10^9/L (ref 150–375)
POTASSIUM SERPL-SCNC: 4.72 MMOL/L (ref 3.5–5.3)
RBC # BLD AUTO: 5.01 10*12/L (ref 3.8–5.2)
SODIUM SERPL-SCNC: 134.3 MMOL/L (ref 135–146)
WBC # BLD AUTO: 5.5 10*9/L (ref 4–11)

## 2023-01-06 PROCEDURE — 99214 OFFICE O/P EST MOD 30 MIN: CPT | Performed by: FAMILY MEDICINE

## 2023-01-06 PROCEDURE — 85025 COMPLETE CBC W/AUTO DIFF WBC: CPT | Performed by: FAMILY MEDICINE

## 2023-01-06 PROCEDURE — 80048 BASIC METABOLIC PNL TOTAL CA: CPT | Performed by: FAMILY MEDICINE

## 2023-01-06 PROCEDURE — 36415 COLL VENOUS BLD VENIPUNCTURE: CPT | Performed by: FAMILY MEDICINE

## 2023-01-06 NOTE — H&P (VIEW-ONLY)
[facility]  [address] [place] [address]  [address]  [facility] [address]  Phone: [phone]  Fax: [phone]  Primary Provider: Noemi Kirkpatrick  Pre-op Performing Provider: NOEMI KIRKPATRICK      PREOPERATIVE EVALUATION:  Today's date: 1/6/2023    Ely Pollack is a 72 year old female who presents for a preoperative evaluation.    Surgical Information:  Surgery/Procedure: Revision right total knee arthroplasty   Surgery Location: [facility]   Surgeon: Dr. Lindsay   Surgery Date: 01/18/23  Time of Surgery: AM  Where patient plans to recover: Other: One night stay, then home to recover   Fax number for surgical facility: Note does not need to be faxed, will be available electronically in Epic.    Type of Anesthesia Anticipated: to be determined    Assessment & Plan     The proposed surgical procedure is considered INTERMEDIATE risk.    Problem List Items Addressed This Visit        Nervous and Auditory    Chronic back pain, unspecified back location, unspecified back pain laterality       Respiratory    Mild intermittent asthma without complication       Circulatory    Essential hypertension       Behavioral    Major depressive disorder, recurrent episode, moderate (H)   Other Visit Diagnoses     Preoperative examination    -  Primary    Relevant Orders    VENOUS COLLECTION (Completed)    Basic Metabolic Panel (BFP) (Completed)    HEMOGRAM PLATELET DIFF (BFP) (Completed)    Chronic pain of right knee        Hyponatremia            1. Preoperative examination    - VENOUS COLLECTION  - Basic Metabolic Panel (BFP)  - HEMOGRAM PLATELET DIFF (BFP)    2. Chronic pain of right knee      3. Major depressive disorder, recurrent episode, moderate (H)  Controlled on medications.    4. Essential hypertension  Controlled on medications.    5. Mild intermittent asthma without complication  Controlled.    6. Chronic back pain, unspecified back location, unspecified back pain laterality      7.  "Hyponatremia  Checked today, nearly in range.        Risks and Recommendations:  The patient has the following additional risks and recommendations for perioperative complications:   - No identified additional risk factors other than previously addressed    Medication Instructions:  Patient is to take all scheduled medications on the day of surgery EXCEPT for modifications listed below:  hold aspirin 1 week prior    RECOMMENDATION:  APPROVAL GIVEN to proceed with proposed procedure, without further diagnostic evaluation.      Subjective     HPI related to upcoming procedure: here for preop for right knee replacement. Fell a couple of times due to implant. Has had knee surgery before. Bothering her for 3 years, getting worse.    1. No - Have you ever had a heart attack or stroke?  2. No - Have you ever had surgery on your heart or blood vessels, such as a stent, coronary (heart) bypass, or surgery on an artery in the head, neck, heart, or legs?  3. No - Do you have chest pain when you are physically active?  4. No - Do you have a history of heart failure?  5. No - Do you currently have a cold, bronchitis, or symptoms of other respiratory (head and chest) infections?  6. No - Do you have a cough, shortness of breath, or wheezing?  7. No - Do you or anyone in your family have a history of blood clots?  8. No - Do you or anyone in your family have a serious bleeding problem, such as long-lasting bleeding after surgeries or cuts?  9. No - Have you ever had anemia or been told to take iron pills?  10. No - Have you had any abnormal blood loss such as black, tarry or bloody stools, or abnormal vaginal bleeding?  11. No - Have you ever had a blood transfusion?  12. Yes - Are you willing to have a blood transfusion if it is medically needed before, during, or after your surgery?  13. Yes - Have you or anyone in your family ever had problems with anesthesia (sedation for surgery)? \"it takes me a longer time to come out if " "it\"  14. No - Do you have sleep apnea, excessive snoring, or daytime drowsiness?   15. No - Do you have any artifical heart valves or other implanted medical devices, such as a pacemaker, defibrillator, or continuous glucose monitor?  16. Yes - Do you have any artifical joints? Both knees and back fusion  17. No - Are you allergic to latex?  18. No - Is there any chance that you may be pregnant?    Health Care Directive:  Patient does not have a Health Care Directive or Living Will: Discussed advance care planning with patient; information given to patient to review.    Preoperative Review of :   reviewed - codeine for cough in december, not currently taking      Status of Chronic Conditions:  ASTHMA - Patient has a longstanding history of moderate-severe Asthma . Patient has been doing well overall noting NO SYMPTOMS and continues on medication regimen consisting of daily inhalers without adverse reactions or side effects.      DEPRESSION - Patient has a long history of Depression of moderate severity requiring medication for control with recent symptoms being stable..Current symptoms of depression include none.      HYPERTENSION - Patient has longstanding history of HTN , currently denies any symptoms referable to elevated blood pressure. Specifically denies chest pain, palpitations, dyspnea, orthopnea, PND or peripheral edema. Blood pressure readings have been in normal range. Current medication regimen is as listed below. Patient denies any side effects of medication.     Review of Systems  CONSTITUTIONAL: NEGATIVE for fever, chills, change in weight  INTEGUMENTARY/SKIN: NEGATIVE for worrisome rashes, moles or lesions  EYES: NEGATIVE for vision changes or irritation  ENT/MOUTH: NEGATIVE for ear, mouth and throat problems  RESP: NEGATIVE for significant cough or SOB  CV: NEGATIVE for chest pain, palpitations or peripheral edema  GI: NEGATIVE for nausea, abdominal pain, heartburn, or change in bowel " habits  : NEGATIVE for frequency, dysuria, or hematuria  MUSCULOSKELETAL: NEGATIVE for significant arthralgias or myalgia  NEURO: NEGATIVE for weakness, dizziness or paresthesias  ENDOCRINE: NEGATIVE for temperature intolerance, skin/hair changes  HEME: NEGATIVE for bleeding problems  PSYCHIATRIC: NEGATIVE for changes in mood or affect    Patient Active Problem List    Diagnosis Date Noted     Nuclear sclerotic cataract, bilateral 05/04/2021     Priority: Medium     Essential hypertension 02/12/2021     Priority: Medium     Anxiety 02/12/2021     Priority: Medium     Chronic back pain, unspecified back location, unspecified back pain laterality 02/12/2021     Priority: Medium     Bee sting allergy 02/12/2021     Priority: Medium     Leg cramps 02/12/2021     Priority: Medium     Chronic neck pain 02/12/2021     Priority: Medium     Cervical radiculopathy at C7 12/13/2020     Priority: Medium     Choroidal nevus, right eye 11/26/2019     Priority: Medium     Degenerative drusen, bilateral 11/26/2019     Priority: Medium     Dermatochalasis of both upper eyelids 11/26/2019     Priority: Medium     Presbyopia 11/26/2019     Priority: Medium     Regular astigmatism, bilateral 11/26/2019     Priority: Medium     Squamous cell carcinoma of lower leg, left 12/06/2017     Priority: Medium     History of squamous cell carcinoma of skin 11/02/2017     Priority: Medium     Formatting of this note might be different from the original.  SCC, left mid lateral lower leg, s/p shave biopsy 10/20/2017 at outside clinic, awaiting treatment       Major depressive disorder, recurrent episode, moderate (H) 09/29/2017     Priority: Medium     History of bilateral knee arthroplasty 03/29/2016     Priority: Medium     Mild intermittent asthma without complication 12/12/2014     Priority: Medium     Degenerative arthritis of lumbar spine 12/12/2014     Priority: Medium      Past Medical History:   Diagnosis Date     Arthritis       Hypertension     Cardiology No     Other chronic pain     Pain for 10 years     PONV (postoperative nausea and vomiting)      Uncomplicated asthma      Past Surgical History:   Procedure Laterality Date     ARTHROPLASTY KNEE BILATERAL Bilateral 3/29/2016    Procedure: ARTHROPLASTY KNEE BILATERAL;  Surgeon: Darryn Cortez MD;  Location: [facility] OR     AS DECOMPRESS SPINAL CORD,1 SEG      Thorasic     AS SPINAL FUSION,ANT,EA ADNL LEVEL      Thorasic     BUNIONECTOMY Left      Current Outpatient Medications   Medication Sig Dispense Refill     albuterol (PROAIR HFA/PROVENTIL HFA/VENTOLIN HFA) 108 (90 Base) MCG/ACT inhaler Inhale 2 puffs into the lungs every 6 hours as needed for shortness of breath / dyspnea or wheezing 18 g 3     albuterol (PROVENTIL) (2.5 MG/3ML) 0.083% neb solution Take 1 vial (2.5 mg) by nebulization every 6 hours as needed for shortness of breath / dyspnea or wheezing 90 mL 3     aspirin (ASA) 81 MG chewable tablet 2 tablets daily       atenolol (TENORMIN) 50 MG tablet Take 1 tablet (50 mg) by mouth daily 90 tablet 1     busPIRone (BUSPAR) 15 MG tablet Take 0.5 tablets (7.5 mg) by mouth 2 times daily 90 tablet 1     citalopram (CELEXA) 40 MG tablet Take 1 tablet (40 mg) by mouth daily 90 tablet 1     EPINEPHrine (ANY BX GENERIC EQUIV) 0.3 MG/0.3ML injection 2-pack Inject 0.3 mg into the muscle as needed for anaphylaxis        hydrochlorothiazide (HYDRODIURIL) 25 MG tablet Take 1 tablet (25 mg) by mouth daily 90 tablet 1     KLOR-CON 20 MEQ CR tablet Take 1 tablet (20 mEq) by mouth daily 90 tablet 1     MAGNESIUM GLUCONATE  mg daily         Allergies   Allergen Reactions     Bee Venom Anaphylaxis     Clavulanic Acid Diarrhea     Oxycodone Nausea and Vomiting     Does ok with 1/2 pill     Seasonal Allergies      Latex Rash        Social History     Tobacco Use     Smoking status: Never     Smokeless tobacco: Never   Substance Use Topics     Alcohol use: Yes     Comment: 3 drinks weekly  "    Family History   Problem Relation Age of Onset     Bone Cancer Mother      Chronic Obstructive Pulmonary Disease Father      Heart Disease Brother      Kidney Disease Brother      Diabetes Type 2  Brother      Alcoholism Brother      Heart Disease Brother      Kidney Disease Brother      Alcoholism Brother      Diabetes Type 2  Brother      Colon Cancer Maternal Grandmother      History   Drug Use No         Objective     /78 (BP Location: Right arm, Patient Position: Sitting, Cuff Size: Adult Large)   Pulse 59   Temp 97.7  F (36.5  C) (Temporal)   Ht 1.664 m (5' 5.5\")   Wt 92.3 kg (203 lb 6.4 oz)   SpO2 98%   BMI 33.33 kg/m      Physical Exam    GENERAL APPEARANCE: healthy, alert and no distress     EYES: EOMI, PERRL     HENT: ear canals and TM's normal and nose and mouth without ulcers or lesions     NECK: no adenopathy, no asymmetry, masses, or scars and thyroid normal to palpation     RESP: lungs clear to auscultation - no rales, rhonchi or wheezes     CV: regular rates and rhythm, normal S1 S2, no S3 or S4 and no murmur, click or rub     ABDOMEN:  soft, nontender, no HSM or masses and bowel sounds normal     MS: extremities normal- no gross deformities noted, no evidence of inflammation in joints, FROM in all extremities.     SKIN: no suspicious lesions or rashes     NEURO: Normal strength and tone, sensory exam grossly normal, mentation intact and speech normal     PSYCH: mentation appears normal. and affect normal/bright     LYMPHATICS: No cervical adenopathy    Recent Labs   Lab Test 12/09/22  1334 12/09/22  0000 10/13/22  1258 10/13/22  1018 04/05/22  1216   HGB  --  13.6  --   --   --    PLT  --  304  --   --   --    .5*  --  137.0  --   --    POTASSIUM 3.85  --  4.84  --   --    CR 0.78  --  0.79  --   --    A1C  --   --   --  5.5 5.9        Diagnostics:  Recent Results (from the past 240 hour(s))   HEMOGRAM PLATELET DIFF (BFP)    Collection Time: 01/06/23 12:14 PM   Result Value Ref " Range    WBC 5.5 4.0 - 11 10*9/L    RBC Count 5.01 3.8 - 5.2 10*12/L    Hemoglobin 13.8 11.7 - 15.7 g/dL    Hematocrit 43.0 35.0 - 47.0 %    MCV 85.9 78 - 100 fL    MCH 27.5 26 - 33 pg    MCHC 32.1 31 - 36 g/dL    Platelet Count 257 150 - 375 10^9/L    % Granulocytes 64.9 %    % Lymphocytes 25.2 %    % Monocytes 9.9 %   Basic Metabolic Panel (BFP)    Collection Time: 01/06/23  3:12 PM   Result Value Ref Range    Carbon Dioxide 31.6 20 - 32 mmol/L    Creatinine 0.76 0.60 - 1.30 mg/dL    Glucose 96 60 - 99 mg/dL    Sodium 134.3 (A) 135 - 146 mmol/L    Potassium 4.72 3.5 - 5.3 mmol/L    Chloride 95.9 (A) 98 - 110 mmol/L    Urea Nitrogen 13 7 - 25 mg/dL    Calcium 9.3 8.6 - 10.3 mg/dL    BUN/Creatinine Ratio 17.1 6 - 22      EKG--discussed with cardiologist on call. p axis abnormal but otherwise normal, could be artifact. Not concerning for surgery    Revised Cardiac Risk Index (RCRI):  The patient has the following serious cardiovascular risks for perioperative complications:   - No serious cardiac risks = 0 points     RCRI Interpretation: 0 points: Class I (very low risk - 0.4% complication rate)           Signed Electronically by: Noemi Kirkpatrick MD  Copy of this evaluation report is provided to requesting physician.

## 2023-01-06 NOTE — PROGRESS NOTES
[facility] PHYSICIANS  [address] [place] [address]  [address]  [facility] [address]  Phone: [phone]  Fax: [phone]  Primary Provider: Noemi Kirkpatrick  Pre-op Performing Provider: NOEMI KIRKPATRICK      PREOPERATIVE EVALUATION:  Today's date: 1/6/2023    Ely Pollack is a 72 year old female who presents for a preoperative evaluation.    Surgical Information:  Surgery/Procedure: Revision right total knee arthroplasty   Surgery Location: [facility]   Surgeon: Dr. Lindsay   Surgery Date: 01/18/23  Time of Surgery: AM  Where patient plans to recover: Other: One night stay, then home to recover   Fax number for surgical facility: Note does not need to be faxed, will be available electronically in Epic.    Type of Anesthesia Anticipated: to be determined    Assessment & Plan     The proposed surgical procedure is considered INTERMEDIATE risk.    Problem List Items Addressed This Visit        Nervous and Auditory    Chronic back pain, unspecified back location, unspecified back pain laterality       Respiratory    Mild intermittent asthma without complication       Circulatory    Essential hypertension       Behavioral    Major depressive disorder, recurrent episode, moderate (H)   Other Visit Diagnoses     Preoperative examination    -  Primary    Relevant Orders    VENOUS COLLECTION (Completed)    Basic Metabolic Panel (BFP) (Completed)    HEMOGRAM PLATELET DIFF (BFP) (Completed)    Chronic pain of right knee        Hyponatremia            1. Preoperative examination    - VENOUS COLLECTION  - Basic Metabolic Panel (BFP)  - HEMOGRAM PLATELET DIFF (BFP)    2. Chronic pain of right knee      3. Major depressive disorder, recurrent episode, moderate (H)  Controlled on medications.    4. Essential hypertension  Controlled on medications.    5. Mild intermittent asthma without complication  Controlled.    6. Chronic back pain, unspecified back location, unspecified back pain laterality      7.  "Hyponatremia  Checked today, nearly in range.        Risks and Recommendations:  The patient has the following additional risks and recommendations for perioperative complications:   - No identified additional risk factors other than previously addressed    Medication Instructions:  Patient is to take all scheduled medications on the day of surgery EXCEPT for modifications listed below:  hold aspirin 1 week prior    RECOMMENDATION:  APPROVAL GIVEN to proceed with proposed procedure, without further diagnostic evaluation.      Subjective     HPI related to upcoming procedure: here for preop for right knee replacement. Fell a couple of times due to implant. Has had knee surgery before. Bothering her for 3 years, getting worse.    1. No - Have you ever had a heart attack or stroke?  2. No - Have you ever had surgery on your heart or blood vessels, such as a stent, coronary (heart) bypass, or surgery on an artery in the head, neck, heart, or legs?  3. No - Do you have chest pain when you are physically active?  4. No - Do you have a history of heart failure?  5. No - Do you currently have a cold, bronchitis, or symptoms of other respiratory (head and chest) infections?  6. No - Do you have a cough, shortness of breath, or wheezing?  7. No - Do you or anyone in your family have a history of blood clots?  8. No - Do you or anyone in your family have a serious bleeding problem, such as long-lasting bleeding after surgeries or cuts?  9. No - Have you ever had anemia or been told to take iron pills?  10. No - Have you had any abnormal blood loss such as black, tarry or bloody stools, or abnormal vaginal bleeding?  11. No - Have you ever had a blood transfusion?  12. Yes - Are you willing to have a blood transfusion if it is medically needed before, during, or after your surgery?  13. Yes - Have you or anyone in your family ever had problems with anesthesia (sedation for surgery)? \"it takes me a longer time to come out if " "it\"  14. No - Do you have sleep apnea, excessive snoring, or daytime drowsiness?   15. No - Do you have any artifical heart valves or other implanted medical devices, such as a pacemaker, defibrillator, or continuous glucose monitor?  16. Yes - Do you have any artifical joints? Both knees and back fusion  17. No - Are you allergic to latex?  18. No - Is there any chance that you may be pregnant?    Health Care Directive:  Patient does not have a Health Care Directive or Living Will: Discussed advance care planning with patient; information given to patient to review.    Preoperative Review of :   reviewed - codeine for cough in december, not currently taking      Status of Chronic Conditions:  ASTHMA - Patient has a longstanding history of moderate-severe Asthma . Patient has been doing well overall noting NO SYMPTOMS and continues on medication regimen consisting of daily inhalers without adverse reactions or side effects.      DEPRESSION - Patient has a long history of Depression of moderate severity requiring medication for control with recent symptoms being stable..Current symptoms of depression include none.      HYPERTENSION - Patient has longstanding history of HTN , currently denies any symptoms referable to elevated blood pressure. Specifically denies chest pain, palpitations, dyspnea, orthopnea, PND or peripheral edema. Blood pressure readings have been in normal range. Current medication regimen is as listed below. Patient denies any side effects of medication.     Review of Systems  CONSTITUTIONAL: NEGATIVE for fever, chills, change in weight  INTEGUMENTARY/SKIN: NEGATIVE for worrisome rashes, moles or lesions  EYES: NEGATIVE for vision changes or irritation  ENT/MOUTH: NEGATIVE for ear, mouth and throat problems  RESP: NEGATIVE for significant cough or SOB  CV: NEGATIVE for chest pain, palpitations or peripheral edema  GI: NEGATIVE for nausea, abdominal pain, heartburn, or change in bowel " habits  : NEGATIVE for frequency, dysuria, or hematuria  MUSCULOSKELETAL: NEGATIVE for significant arthralgias or myalgia  NEURO: NEGATIVE for weakness, dizziness or paresthesias  ENDOCRINE: NEGATIVE for temperature intolerance, skin/hair changes  HEME: NEGATIVE for bleeding problems  PSYCHIATRIC: NEGATIVE for changes in mood or affect    Patient Active Problem List    Diagnosis Date Noted     Nuclear sclerotic cataract, bilateral 05/04/2021     Priority: Medium     Essential hypertension 02/12/2021     Priority: Medium     Anxiety 02/12/2021     Priority: Medium     Chronic back pain, unspecified back location, unspecified back pain laterality 02/12/2021     Priority: Medium     Bee sting allergy 02/12/2021     Priority: Medium     Leg cramps 02/12/2021     Priority: Medium     Chronic neck pain 02/12/2021     Priority: Medium     Cervical radiculopathy at C7 12/13/2020     Priority: Medium     Choroidal nevus, right eye 11/26/2019     Priority: Medium     Degenerative drusen, bilateral 11/26/2019     Priority: Medium     Dermatochalasis of both upper eyelids 11/26/2019     Priority: Medium     Presbyopia 11/26/2019     Priority: Medium     Regular astigmatism, bilateral 11/26/2019     Priority: Medium     Squamous cell carcinoma of lower leg, left 12/06/2017     Priority: Medium     History of squamous cell carcinoma of skin 11/02/2017     Priority: Medium     Formatting of this note might be different from the original.  SCC, left mid lateral lower leg, s/p shave biopsy 10/20/2017 at outside clinic, awaiting treatment       Major depressive disorder, recurrent episode, moderate (H) 09/29/2017     Priority: Medium     History of bilateral knee arthroplasty 03/29/2016     Priority: Medium     Mild intermittent asthma without complication 12/12/2014     Priority: Medium     Degenerative arthritis of lumbar spine 12/12/2014     Priority: Medium      Past Medical History:   Diagnosis Date     Arthritis       Hypertension     Cardiology No     Other chronic pain     Pain for 10 years     PONV (postoperative nausea and vomiting)      Uncomplicated asthma      Past Surgical History:   Procedure Laterality Date     ARTHROPLASTY KNEE BILATERAL Bilateral 3/29/2016    Procedure: ARTHROPLASTY KNEE BILATERAL;  Surgeon: Darryn Cortez MD;  Location: [facility] OR     AS DECOMPRESS SPINAL CORD,1 SEG      Thorasic     AS SPINAL FUSION,ANT,EA ADNL LEVEL      Thorasic     BUNIONECTOMY Left      Current Outpatient Medications   Medication Sig Dispense Refill     albuterol (PROAIR HFA/PROVENTIL HFA/VENTOLIN HFA) 108 (90 Base) MCG/ACT inhaler Inhale 2 puffs into the lungs every 6 hours as needed for shortness of breath / dyspnea or wheezing 18 g 3     albuterol (PROVENTIL) (2.5 MG/3ML) 0.083% neb solution Take 1 vial (2.5 mg) by nebulization every 6 hours as needed for shortness of breath / dyspnea or wheezing 90 mL 3     aspirin (ASA) 81 MG chewable tablet 2 tablets daily       atenolol (TENORMIN) 50 MG tablet Take 1 tablet (50 mg) by mouth daily 90 tablet 1     busPIRone (BUSPAR) 15 MG tablet Take 0.5 tablets (7.5 mg) by mouth 2 times daily 90 tablet 1     citalopram (CELEXA) 40 MG tablet Take 1 tablet (40 mg) by mouth daily 90 tablet 1     EPINEPHrine (ANY BX GENERIC EQUIV) 0.3 MG/0.3ML injection 2-pack Inject 0.3 mg into the muscle as needed for anaphylaxis        hydrochlorothiazide (HYDRODIURIL) 25 MG tablet Take 1 tablet (25 mg) by mouth daily 90 tablet 1     KLOR-CON 20 MEQ CR tablet Take 1 tablet (20 mEq) by mouth daily 90 tablet 1     MAGNESIUM GLUCONATE  mg daily         Allergies   Allergen Reactions     Bee Venom Anaphylaxis     Clavulanic Acid Diarrhea     Oxycodone Nausea and Vomiting     Does ok with 1/2 pill     Seasonal Allergies      Latex Rash        Social History     Tobacco Use     Smoking status: Never     Smokeless tobacco: Never   Substance Use Topics     Alcohol use: Yes     Comment: 3 drinks weekly  "    Family History   Problem Relation Age of Onset     Bone Cancer Mother      Chronic Obstructive Pulmonary Disease Father      Heart Disease Brother      Kidney Disease Brother      Diabetes Type 2  Brother      Alcoholism Brother      Heart Disease Brother      Kidney Disease Brother      Alcoholism Brother      Diabetes Type 2  Brother      Colon Cancer Maternal Grandmother      History   Drug Use No         Objective     /78 (BP Location: Right arm, Patient Position: Sitting, Cuff Size: Adult Large)   Pulse 59   Temp 97.7  F (36.5  C) (Temporal)   Ht 1.664 m (5' 5.5\")   Wt 92.3 kg (203 lb 6.4 oz)   SpO2 98%   BMI 33.33 kg/m      Physical Exam    GENERAL APPEARANCE: healthy, alert and no distress     EYES: EOMI, PERRL     HENT: ear canals and TM's normal and nose and mouth without ulcers or lesions     NECK: no adenopathy, no asymmetry, masses, or scars and thyroid normal to palpation     RESP: lungs clear to auscultation - no rales, rhonchi or wheezes     CV: regular rates and rhythm, normal S1 S2, no S3 or S4 and no murmur, click or rub     ABDOMEN:  soft, nontender, no HSM or masses and bowel sounds normal     MS: extremities normal- no gross deformities noted, no evidence of inflammation in joints, FROM in all extremities.     SKIN: no suspicious lesions or rashes     NEURO: Normal strength and tone, sensory exam grossly normal, mentation intact and speech normal     PSYCH: mentation appears normal. and affect normal/bright     LYMPHATICS: No cervical adenopathy    Recent Labs   Lab Test 12/09/22  1334 12/09/22  0000 10/13/22  1258 10/13/22  1018 04/05/22  1216   HGB  --  13.6  --   --   --    PLT  --  304  --   --   --    .5*  --  137.0  --   --    POTASSIUM 3.85  --  4.84  --   --    CR 0.78  --  0.79  --   --    A1C  --   --   --  5.5 5.9        Diagnostics:  Recent Results (from the past 240 hour(s))   HEMOGRAM PLATELET DIFF (BFP)    Collection Time: 01/06/23 12:14 PM   Result Value Ref " Range    WBC 5.5 4.0 - 11 10*9/L    RBC Count 5.01 3.8 - 5.2 10*12/L    Hemoglobin 13.8 11.7 - 15.7 g/dL    Hematocrit 43.0 35.0 - 47.0 %    MCV 85.9 78 - 100 fL    MCH 27.5 26 - 33 pg    MCHC 32.1 31 - 36 g/dL    Platelet Count 257 150 - 375 10^9/L    % Granulocytes 64.9 %    % Lymphocytes 25.2 %    % Monocytes 9.9 %   Basic Metabolic Panel (BFP)    Collection Time: 01/06/23  3:12 PM   Result Value Ref Range    Carbon Dioxide 31.6 20 - 32 mmol/L    Creatinine 0.76 0.60 - 1.30 mg/dL    Glucose 96 60 - 99 mg/dL    Sodium 134.3 (A) 135 - 146 mmol/L    Potassium 4.72 3.5 - 5.3 mmol/L    Chloride 95.9 (A) 98 - 110 mmol/L    Urea Nitrogen 13 7 - 25 mg/dL    Calcium 9.3 8.6 - 10.3 mg/dL    BUN/Creatinine Ratio 17.1 6 - 22      EKG--discussed with cardiologist on call. p axis abnormal but otherwise normal, could be artifact. Not concerning for surgery    Revised Cardiac Risk Index (RCRI):  The patient has the following serious cardiovascular risks for perioperative complications:   - No serious cardiac risks = 0 points     RCRI Interpretation: 0 points: Class I (very low risk - 0.4% complication rate)           Signed Electronically by: Noemi Kirkpatrick MD  Copy of this evaluation report is provided to requesting physician.

## 2023-01-06 NOTE — NURSING NOTE
Chief Complaint   Patient presents with     Pre-Op Exam     Surgery/Procedure: Revision right total knee arthroplasty   Surgery Location:  [facility]   Surgeon: Dr. Lindsay   Surgery Date: 01/18/23

## 2023-01-16 RX ORDER — LEVOCETIRIZINE DIHYDROCHLORIDE 5 MG/1
5 TABLET, FILM COATED ORAL DAILY
COMMUNITY

## 2023-01-16 RX ORDER — NAPROXEN SODIUM 220 MG
440 TABLET ORAL DAILY PRN
Status: ON HOLD | COMMUNITY
End: 2023-01-18

## 2023-01-16 RX ORDER — FLUTICASONE PROPIONATE 50 MCG
1 SPRAY, SUSPENSION (ML) NASAL DAILY PRN
COMMUNITY

## 2023-01-16 RX ORDER — TRIAMCINOLONE ACETONIDE 1 MG/G
CREAM TOPICAL 2 TIMES DAILY PRN
COMMUNITY
End: 2024-04-30

## 2023-01-17 NOTE — PROGRESS NOTES
PTA medications updated by Medication Scribe prior to surgery via phone call with patient (last doses completed by Nurse)     Medication history sources: Patient, Surescripts, H&P and Patient's home med list  In the past week, patient estimated taking medication this percent of the time: Greater than 90%  Adherence assessment: N/A Not Observed    Significant changes made to the medication list:  None      Additional medication history information:   Patient was advised to bring: ALBUTEROL INH    Medication reconciliation completed by provider prior to medication history? No    Time spent in this activity: 40 MINUTES    The information provided in this note is only as accurate as the sources available at the time of update(s)      Prior to Admission medications    Medication Sig Last Dose Taking? Auth Provider Long Term End Date   albuterol (PROAIR HFA/PROVENTIL HFA/VENTOLIN HFA) 108 (90 Base) MCG/ACT inhaler Inhale 2 puffs into the lungs every 6 hours as needed for shortness of breath / dyspnea or wheezing  at PRN Yes Ramo Zamora MD Yes    albuterol (PROVENTIL) (2.5 MG/3ML) 0.083% neb solution Take 1 vial (2.5 mg) by nebulization every 6 hours as needed for shortness of breath / dyspnea or wheezing  at PRN Yes Ramo Zamora MD Yes    aspirin (ASA) 81 MG EC tablet Take 162 mg by mouth daily (81MG X 2 = 162MG)  Yes Reported, Patient     atenolol (TENORMIN) 50 MG tablet Take 1 tablet (50 mg) by mouth daily  at AM Yes Noemi Kirkpatrick MD Yes    busPIRone (BUSPAR) 15 MG tablet Take 0.5 tablets (7.5 mg) by mouth 2 times daily  at AM Yes Noemi Kirkpatrick MD Yes    citalopram (CELEXA) 40 MG tablet Take 1 tablet (40 mg) by mouth daily  at AM Yes Noemi Kirkpatrick MD Yes    EPINEPHrine (ANY BX GENERIC EQUIV) 0.3 MG/0.3ML injection 2-pack Inject 0.3 mg into the muscle as needed for anaphylaxis   Yes Noemi Kirkpatrick MD     fluticasone (FLONASE) 50 MCG/ACT nasal spray Spray 1 spray into both nostrils  daily as needed for allergies  at PRN Yes Reported, Patient     hydrochlorothiazide (HYDRODIURIL) 25 MG tablet Take 1 tablet (25 mg) by mouth daily  at AM Yes Noemi Kirkpatrick MD Yes    KLOR-CON 20 MEQ CR tablet Take 1 tablet (20 mEq) by mouth daily  at AM Yes oNemi Kirkpatrick MD     levocetirizine (XYZAL) 5 MG tablet Take 5 mg by mouth daily  at AM Yes Reported, Patient     MAGNESIUM GLYCINATE PO Take 400 mg by mouth 2 times daily as needed (LEG CRAMPS)  at PM Yes Reported, Patient     melatonin 5 MG tablet Take 10 mg by mouth nightly as needed for sleep (5MG X 2 = 10MG)  at PM Yes Reported, Patient     naproxen sodium (ANAPROX) 220 MG tablet Take 440 mg by mouth daily as needed for moderate pain (4-6) (220MG X 2 = 440MG)  Yes Reported, Patient     omeprazole (PRILOSEC) 20 MG DR capsule Take 20 mg by mouth daily  at AM Yes Reported, Patient     triamcinolone (KENALOG) 0.1 % external cream Apply topically 2 times daily as needed for irritation  Yes Reported, Patient

## 2023-01-18 ENCOUNTER — APPOINTMENT (OUTPATIENT)
Dept: PHYSICAL THERAPY | Facility: CLINIC | Age: 73
DRG: 467 | End: 2023-01-18
Attending: ORTHOPAEDIC SURGERY
Payer: COMMERCIAL

## 2023-01-18 ENCOUNTER — ANESTHESIA (OUTPATIENT)
Dept: SURGERY | Facility: CLINIC | Age: 73
DRG: 467 | End: 2023-01-18
Payer: COMMERCIAL

## 2023-01-18 ENCOUNTER — HOSPITAL ENCOUNTER (INPATIENT)
Facility: CLINIC | Age: 73
LOS: 1 days | Discharge: HOME | DRG: 467 | End: 2023-01-19
Attending: ORTHOPAEDIC SURGERY | Admitting: ORTHOPAEDIC SURGERY
Payer: COMMERCIAL

## 2023-01-18 ENCOUNTER — ANESTHESIA EVENT (OUTPATIENT)
Dept: SURGERY | Facility: CLINIC | Age: 73
DRG: 467 | End: 2023-01-18
Payer: COMMERCIAL

## 2023-01-18 ENCOUNTER — APPOINTMENT (OUTPATIENT)
Dept: GENERAL RADIOLOGY | Facility: CLINIC | Age: 73
DRG: 467 | End: 2023-01-18
Attending: PHYSICIAN ASSISTANT
Payer: COMMERCIAL

## 2023-01-18 DIAGNOSIS — Z96.651 S/P REVISION OF TOTAL KNEE, RIGHT: Primary | ICD-10-CM

## 2023-01-18 LAB
% LINING CELLS, BODY FLUID: 4 %
APPEARANCE FLD: ABNORMAL
CELL COUNT BODY FLUID SOURCE: ABNORMAL
COLOR FLD: YELLOW
GRAM STAIN RESULT: NORMAL
LYMPHOCYTES NFR FLD MANUAL: 40 %
MONOS+MACROS NFR FLD MANUAL: 47 %
NEUTS BAND NFR FLD MANUAL: 9 %
POTASSIUM SERPL-SCNC: 4.1 MMOL/L (ref 3.4–5.3)
WBC # FLD AUTO: 798 /UL

## 2023-01-18 PROCEDURE — 0SRC0J9 REPLACEMENT OF RIGHT KNEE JOINT WITH SYNTHETIC SUBSTITUTE, CEMENTED, OPEN APPROACH: ICD-10-PCS | Performed by: ORTHOPAEDIC SURGERY

## 2023-01-18 PROCEDURE — 250N000025 HC SEVOFLURANE, PER MIN: Performed by: ORTHOPAEDIC SURGERY

## 2023-01-18 PROCEDURE — 97116 GAIT TRAINING THERAPY: CPT | Mod: GP

## 2023-01-18 PROCEDURE — 999N000063 XR KNEE PORT RIGHT 1/2 VIEWS: Mod: RT

## 2023-01-18 PROCEDURE — 87075 CULTR BACTERIA EXCEPT BLOOD: CPT | Performed by: ORTHOPAEDIC SURGERY

## 2023-01-18 PROCEDURE — 258N000003 HC RX IP 258 OP 636: Performed by: ANESTHESIOLOGY

## 2023-01-18 PROCEDURE — 250N000009 HC RX 250: Performed by: NURSE ANESTHETIST, CERTIFIED REGISTERED

## 2023-01-18 PROCEDURE — 250N000011 HC RX IP 250 OP 636: Performed by: ORTHOPAEDIC SURGERY

## 2023-01-18 PROCEDURE — 120N000001 HC R&B MED SURG/OB

## 2023-01-18 PROCEDURE — 97161 PT EVAL LOW COMPLEX 20 MIN: CPT | Mod: GP

## 2023-01-18 PROCEDURE — C1763 CONN TISS, NON-HUMAN: HCPCS | Performed by: ORTHOPAEDIC SURGERY

## 2023-01-18 PROCEDURE — 250N000011 HC RX IP 250 OP 636: Performed by: NURSE ANESTHETIST, CERTIFIED REGISTERED

## 2023-01-18 PROCEDURE — 271N000001 HC OR GENERAL SUPPLY NON-STERILE: Performed by: ORTHOPAEDIC SURGERY

## 2023-01-18 PROCEDURE — 250N000011 HC RX IP 250 OP 636: Performed by: PHYSICIAN ASSISTANT

## 2023-01-18 PROCEDURE — 0SUC09Z SUPPLEMENT RIGHT KNEE JOINT WITH LINER, OPEN APPROACH: ICD-10-PCS | Performed by: ORTHOPAEDIC SURGERY

## 2023-01-18 PROCEDURE — 36415 COLL VENOUS BLD VENIPUNCTURE: CPT | Performed by: ANESTHESIOLOGY

## 2023-01-18 PROCEDURE — 97530 THERAPEUTIC ACTIVITIES: CPT | Mod: GP

## 2023-01-18 PROCEDURE — 710N000009 HC RECOVERY PHASE 1, LEVEL 1, PER MIN: Performed by: ORTHOPAEDIC SURGERY

## 2023-01-18 PROCEDURE — 360N000078 HC SURGERY LEVEL 5, PER MIN: Performed by: ORTHOPAEDIC SURGERY

## 2023-01-18 PROCEDURE — C1713 ANCHOR/SCREW BN/BN,TIS/BN: HCPCS | Performed by: ORTHOPAEDIC SURGERY

## 2023-01-18 PROCEDURE — 87176 TISSUE HOMOGENIZATION CULTR: CPT | Performed by: ORTHOPAEDIC SURGERY

## 2023-01-18 PROCEDURE — 258N000001 HC RX 258: Performed by: ORTHOPAEDIC SURGERY

## 2023-01-18 PROCEDURE — 250N000009 HC RX 250: Performed by: ORTHOPAEDIC SURGERY

## 2023-01-18 PROCEDURE — 250N000013 HC RX MED GY IP 250 OP 250 PS 637: Performed by: PHYSICIAN ASSISTANT

## 2023-01-18 PROCEDURE — 84132 ASSAY OF SERUM POTASSIUM: CPT | Performed by: ANESTHESIOLOGY

## 2023-01-18 PROCEDURE — 87070 CULTURE OTHR SPECIMN AEROBIC: CPT | Performed by: ORTHOPAEDIC SURGERY

## 2023-01-18 PROCEDURE — 0SPC09Z REMOVAL OF LINER FROM RIGHT KNEE JOINT, OPEN APPROACH: ICD-10-PCS | Performed by: ORTHOPAEDIC SURGERY

## 2023-01-18 PROCEDURE — 250N000011 HC RX IP 250 OP 636: Performed by: ANESTHESIOLOGY

## 2023-01-18 PROCEDURE — C1776 JOINT DEVICE (IMPLANTABLE): HCPCS | Performed by: ORTHOPAEDIC SURGERY

## 2023-01-18 PROCEDURE — 272N000001 HC OR GENERAL SUPPLY STERILE: Performed by: ORTHOPAEDIC SURGERY

## 2023-01-18 PROCEDURE — 87205 SMEAR GRAM STAIN: CPT | Performed by: ORTHOPAEDIC SURGERY

## 2023-01-18 PROCEDURE — 89050 BODY FLUID CELL COUNT: CPT | Performed by: ORTHOPAEDIC SURGERY

## 2023-01-18 PROCEDURE — 0SPC0JZ REMOVAL OF SYNTHETIC SUBSTITUTE FROM RIGHT KNEE JOINT, OPEN APPROACH: ICD-10-PCS | Performed by: ORTHOPAEDIC SURGERY

## 2023-01-18 PROCEDURE — 370N000017 HC ANESTHESIA TECHNICAL FEE, PER MIN: Performed by: ORTHOPAEDIC SURGERY

## 2023-01-18 PROCEDURE — 258N000003 HC RX IP 258 OP 636: Performed by: NURSE ANESTHETIST, CERTIFIED REGISTERED

## 2023-01-18 PROCEDURE — 999N000141 HC STATISTIC PRE-PROCEDURE NURSING ASSESSMENT: Performed by: ORTHOPAEDIC SURGERY

## 2023-01-18 PROCEDURE — 258N000003 HC RX IP 258 OP 636: Performed by: PHYSICIAN ASSISTANT

## 2023-01-18 DEVICE — IMPLANTABLE DEVICE: Type: IMPLANTABLE DEVICE | Site: KNEE | Status: FUNCTIONAL

## 2023-01-18 DEVICE — ATTUNE KNEE SYSTEM REVISION POSTERIOR FEMORAL AUGMENT 4MM CEMENTED SIZE 5
Type: IMPLANTABLE DEVICE | Site: KNEE | Status: FUNCTIONAL
Brand: ATTUNE

## 2023-01-18 DEVICE — ATTUNE KNEE SYSTEM REVISION PRESSFIT STEM 16X60MM
Type: IMPLANTABLE DEVICE | Site: KNEE | Status: FUNCTIONAL
Brand: ATTUNE

## 2023-01-18 DEVICE — ATTUNE KNEE SYSTEM REVISION CRS FEMORAL CEMENTED RIGHT SIZE 5
Type: IMPLANTABLE DEVICE | Site: KNEE | Status: FUNCTIONAL
Brand: ATTUNE

## 2023-01-18 DEVICE — STIMULAN® RAPID CURE PROVIDED STERILE FOR SINGLE PATIENT USE. STIMULAN® RAPID CURE CONTAINS CALCIUM SULFATE POWDER AND MIXING SOLUTION IN PRE-MEASURED QUANTITIES SO THAT WHEN MIXED TOGETHER IN A STERILE MIXING BOWL, THE RESULTANT PASTE IS TO BE DIGITALLY PACKED INTO OPEN BONE VOID/GAP TO SET INSITU OR PLACED INTO THE MOULD PROVIDED, THE MIXTURE SETS TO FORM BEADS. THE BIODEGRADABLE, RADIOPAQUE BEADS ARE RESORBED IN APPROXIMATELY 30 – 60 DAYS WHEN USED IN ACCORDANCE WITH THE DEVICE LABELLING. STIMULAN® RAPID CURE IS MANUFACTURED FROM SYNTHETIC IMPLANT GRADE CALCIUM SULFATE DIHYDRATE(CASO4.2H2O) THAT RESORBS AND IS REPLACED WITH BONE DURING THE HEALING PROCESS. ALSO, AS THE BONE VOID FILLER BEADS ARE BIODEGRADABLE AND BIOCOMPATIBLE, THEY MAY BE USED AT AN INFECTED SITE.
Type: IMPLANTABLE DEVICE | Site: KNEE | Status: FUNCTIONAL
Brand: STIMULAN® RAPID CURE

## 2023-01-18 DEVICE — ATTUNE KNEE SYSTEM REVISION TIBIAL SLEEVE POROCOAT PARTIALLY COATED 29MM
Type: IMPLANTABLE DEVICE | Site: KNEE | Status: FUNCTIONAL
Brand: ATTUNE

## 2023-01-18 DEVICE — ATTUNE KNEE SYSTEM REVISION ROTATING PLATFORM TIBIAL BASE CEMENTED SIZE 4
Type: IMPLANTABLE DEVICE | Site: KNEE | Status: FUNCTIONAL
Brand: ATTUNE

## 2023-01-18 DEVICE — ATTUNE KNEE SYSTEM REVISION CRS ROTATING PLATFORM INSERT AOX 10MM SIZE 5
Type: IMPLANTABLE DEVICE | Site: KNEE | Status: FUNCTIONAL
Brand: ATTUNE

## 2023-01-18 DEVICE — BONE CEMENT SIMPLEX W/TOBRAMYCIN 6197-9-001: Type: IMPLANTABLE DEVICE | Site: KNEE | Status: FUNCTIONAL

## 2023-01-18 RX ORDER — TRANEXAMIC ACID 650 MG/1
1950 TABLET ORAL ONCE
Status: COMPLETED | OUTPATIENT
Start: 2023-01-18 | End: 2023-01-18

## 2023-01-18 RX ORDER — DOXYCYCLINE 100 MG/1
100 CAPSULE ORAL 2 TIMES DAILY
Qty: 28 CAPSULE | Refills: 0 | Status: SHIPPED | OUTPATIENT
Start: 2023-01-18 | End: 2023-02-08

## 2023-01-18 RX ORDER — GLYCOPYRROLATE 0.2 MG/ML
INJECTION, SOLUTION INTRAMUSCULAR; INTRAVENOUS PRN
Status: DISCONTINUED | OUTPATIENT
Start: 2023-01-18 | End: 2023-01-18

## 2023-01-18 RX ORDER — CELECOXIB 200 MG/1
200 CAPSULE ORAL DAILY
Qty: 5 CAPSULE | Refills: 0 | Status: SHIPPED | OUTPATIENT
Start: 2023-01-18 | End: 2023-01-30

## 2023-01-18 RX ORDER — MAGNESIUM HYDROXIDE 1200 MG/15ML
LIQUID ORAL PRN
Status: DISCONTINUED | OUTPATIENT
Start: 2023-01-18 | End: 2023-01-18 | Stop reason: HOSPADM

## 2023-01-18 RX ORDER — FENTANYL CITRATE 50 UG/ML
INJECTION, SOLUTION INTRAMUSCULAR; INTRAVENOUS PRN
Status: DISCONTINUED | OUTPATIENT
Start: 2023-01-18 | End: 2023-01-18

## 2023-01-18 RX ORDER — DOXYCYCLINE 100 MG/1
100 CAPSULE ORAL EVERY 12 HOURS SCHEDULED
Status: DISCONTINUED | OUTPATIENT
Start: 2023-01-19 | End: 2023-01-19 | Stop reason: HOSPADM

## 2023-01-18 RX ORDER — ACETAMINOPHEN 325 MG/1
650 TABLET ORAL EVERY 4 HOURS PRN
Status: DISCONTINUED | OUTPATIENT
Start: 2023-01-21 | End: 2023-01-19 | Stop reason: HOSPADM

## 2023-01-18 RX ORDER — DEXAMETHASONE SODIUM PHOSPHATE 4 MG/ML
INJECTION, SOLUTION INTRA-ARTICULAR; INTRALESIONAL; INTRAMUSCULAR; INTRAVENOUS; SOFT TISSUE PRN
Status: DISCONTINUED | OUTPATIENT
Start: 2023-01-18 | End: 2023-01-18

## 2023-01-18 RX ORDER — POLYETHYLENE GLYCOL 3350 17 G/17G
17 POWDER, FOR SOLUTION ORAL DAILY
Status: DISCONTINUED | OUTPATIENT
Start: 2023-01-19 | End: 2023-01-19 | Stop reason: HOSPADM

## 2023-01-18 RX ORDER — NALOXONE HYDROCHLORIDE 0.4 MG/ML
0.2 INJECTION, SOLUTION INTRAMUSCULAR; INTRAVENOUS; SUBCUTANEOUS
Status: DISCONTINUED | OUTPATIENT
Start: 2023-01-18 | End: 2023-01-19 | Stop reason: HOSPADM

## 2023-01-18 RX ORDER — ONDANSETRON 2 MG/ML
INJECTION INTRAMUSCULAR; INTRAVENOUS PRN
Status: DISCONTINUED | OUTPATIENT
Start: 2023-01-18 | End: 2023-01-18

## 2023-01-18 RX ORDER — CEFAZOLIN SODIUM 2 G/100ML
2 INJECTION, SOLUTION INTRAVENOUS EVERY 8 HOURS
Status: COMPLETED | OUTPATIENT
Start: 2023-01-18 | End: 2023-01-19

## 2023-01-18 RX ORDER — NALOXONE HYDROCHLORIDE 0.4 MG/ML
0.4 INJECTION, SOLUTION INTRAMUSCULAR; INTRAVENOUS; SUBCUTANEOUS
Status: DISCONTINUED | OUTPATIENT
Start: 2023-01-18 | End: 2023-01-19 | Stop reason: HOSPADM

## 2023-01-18 RX ORDER — SODIUM CHLORIDE, SODIUM LACTATE, POTASSIUM CHLORIDE, CALCIUM CHLORIDE 600; 310; 30; 20 MG/100ML; MG/100ML; MG/100ML; MG/100ML
INJECTION, SOLUTION INTRAVENOUS CONTINUOUS
Status: DISCONTINUED | OUTPATIENT
Start: 2023-01-18 | End: 2023-01-18 | Stop reason: HOSPADM

## 2023-01-18 RX ORDER — HYDROXYZINE HYDROCHLORIDE 10 MG/1
10 TABLET, FILM COATED ORAL EVERY 6 HOURS PRN
Qty: 30 TABLET | Refills: 0 | Status: SHIPPED | OUTPATIENT
Start: 2023-01-18 | End: 2023-01-30

## 2023-01-18 RX ORDER — HYDROXYZINE HYDROCHLORIDE 10 MG/1
10 TABLET, FILM COATED ORAL EVERY 6 HOURS PRN
Status: DISCONTINUED | OUTPATIENT
Start: 2023-01-18 | End: 2023-01-19 | Stop reason: HOSPADM

## 2023-01-18 RX ORDER — CEFAZOLIN SODIUM/WATER 2 G/20 ML
2 SYRINGE (ML) INTRAVENOUS SEE ADMIN INSTRUCTIONS
Status: DISCONTINUED | OUTPATIENT
Start: 2023-01-18 | End: 2023-01-18 | Stop reason: HOSPADM

## 2023-01-18 RX ORDER — FENTANYL CITRATE 0.05 MG/ML
50 INJECTION, SOLUTION INTRAMUSCULAR; INTRAVENOUS EVERY 5 MIN PRN
Status: DISCONTINUED | OUTPATIENT
Start: 2023-01-18 | End: 2023-01-18 | Stop reason: HOSPADM

## 2023-01-18 RX ORDER — ACETAMINOPHEN 325 MG/1
650 TABLET ORAL EVERY 4 HOURS PRN
Qty: 100 TABLET | Refills: 0 | Status: SHIPPED | OUTPATIENT
Start: 2023-01-18 | End: 2023-03-07

## 2023-01-18 RX ORDER — KETOROLAC TROMETHAMINE 15 MG/ML
15 INJECTION, SOLUTION INTRAMUSCULAR; INTRAVENOUS EVERY 6 HOURS
Status: DISCONTINUED | OUTPATIENT
Start: 2023-01-18 | End: 2023-01-19 | Stop reason: HOSPADM

## 2023-01-18 RX ORDER — VANCOMYCIN HYDROCHLORIDE 1 G/20ML
INJECTION, POWDER, LYOPHILIZED, FOR SOLUTION INTRAVENOUS PRN
Status: DISCONTINUED | OUTPATIENT
Start: 2023-01-18 | End: 2023-01-18 | Stop reason: HOSPADM

## 2023-01-18 RX ORDER — TOBRAMYCIN 1.2 G/30ML
INJECTION, POWDER, LYOPHILIZED, FOR SOLUTION INTRAVENOUS PRN
Status: DISCONTINUED | OUTPATIENT
Start: 2023-01-18 | End: 2023-01-18 | Stop reason: HOSPADM

## 2023-01-18 RX ORDER — ONDANSETRON 4 MG/1
4 TABLET, ORALLY DISINTEGRATING ORAL EVERY 6 HOURS PRN
Status: DISCONTINUED | OUTPATIENT
Start: 2023-01-18 | End: 2023-01-19 | Stop reason: HOSPADM

## 2023-01-18 RX ORDER — CEFAZOLIN SODIUM/WATER 2 G/20 ML
2 SYRINGE (ML) INTRAVENOUS
Status: COMPLETED | OUTPATIENT
Start: 2023-01-18 | End: 2023-01-18

## 2023-01-18 RX ORDER — LIDOCAINE HYDROCHLORIDE 20 MG/ML
INJECTION, SOLUTION INFILTRATION; PERINEURAL PRN
Status: DISCONTINUED | OUTPATIENT
Start: 2023-01-18 | End: 2023-01-18

## 2023-01-18 RX ORDER — HYDROMORPHONE HCL IN WATER/PF 6 MG/30 ML
0.2 PATIENT CONTROLLED ANALGESIA SYRINGE INTRAVENOUS EVERY 5 MIN PRN
Status: DISCONTINUED | OUTPATIENT
Start: 2023-01-18 | End: 2023-01-18 | Stop reason: HOSPADM

## 2023-01-18 RX ORDER — SODIUM CHLORIDE, SODIUM LACTATE, POTASSIUM CHLORIDE, CALCIUM CHLORIDE 600; 310; 30; 20 MG/100ML; MG/100ML; MG/100ML; MG/100ML
INJECTION, SOLUTION INTRAVENOUS CONTINUOUS
Status: ACTIVE | OUTPATIENT
Start: 2023-01-18 | End: 2023-01-19

## 2023-01-18 RX ORDER — OXYCODONE HYDROCHLORIDE 5 MG/1
5 TABLET ORAL EVERY 4 HOURS PRN
Status: DISCONTINUED | OUTPATIENT
Start: 2023-01-18 | End: 2023-01-19 | Stop reason: HOSPADM

## 2023-01-18 RX ORDER — AMOXICILLIN 250 MG
1-2 CAPSULE ORAL 2 TIMES DAILY
Qty: 30 TABLET | Refills: 0 | Status: SHIPPED | OUTPATIENT
Start: 2023-01-18 | End: 2023-01-30

## 2023-01-18 RX ORDER — PROPOFOL 10 MG/ML
INJECTION, EMULSION INTRAVENOUS CONTINUOUS PRN
Status: DISCONTINUED | OUTPATIENT
Start: 2023-01-18 | End: 2023-01-18

## 2023-01-18 RX ORDER — LABETALOL HYDROCHLORIDE 5 MG/ML
10 INJECTION, SOLUTION INTRAVENOUS
Status: DISCONTINUED | OUTPATIENT
Start: 2023-01-18 | End: 2023-01-18 | Stop reason: HOSPADM

## 2023-01-18 RX ORDER — HYDRALAZINE HYDROCHLORIDE 20 MG/ML
2.5-5 INJECTION INTRAMUSCULAR; INTRAVENOUS EVERY 10 MIN PRN
Status: DISCONTINUED | OUTPATIENT
Start: 2023-01-18 | End: 2023-01-18 | Stop reason: HOSPADM

## 2023-01-18 RX ORDER — ONDANSETRON 2 MG/ML
4 INJECTION INTRAMUSCULAR; INTRAVENOUS EVERY 6 HOURS PRN
Status: DISCONTINUED | OUTPATIENT
Start: 2023-01-18 | End: 2023-01-19 | Stop reason: HOSPADM

## 2023-01-18 RX ORDER — PROPOFOL 10 MG/ML
INJECTION, EMULSION INTRAVENOUS PRN
Status: DISCONTINUED | OUTPATIENT
Start: 2023-01-18 | End: 2023-01-18

## 2023-01-18 RX ORDER — HYDROCHLOROTHIAZIDE 25 MG/1
25 TABLET ORAL DAILY
Status: DISCONTINUED | OUTPATIENT
Start: 2023-01-19 | End: 2023-01-19 | Stop reason: HOSPADM

## 2023-01-18 RX ORDER — HYDROMORPHONE HCL IN WATER/PF 6 MG/30 ML
0.4 PATIENT CONTROLLED ANALGESIA SYRINGE INTRAVENOUS EVERY 5 MIN PRN
Status: DISCONTINUED | OUTPATIENT
Start: 2023-01-18 | End: 2023-01-18 | Stop reason: HOSPADM

## 2023-01-18 RX ORDER — OXYCODONE HYDROCHLORIDE 5 MG/1
2.5 TABLET ORAL EVERY 4 HOURS PRN
Qty: 30 TABLET | Refills: 0 | Status: SHIPPED | OUTPATIENT
Start: 2023-01-18 | End: 2023-01-30

## 2023-01-18 RX ORDER — FENTANYL CITRATE 0.05 MG/ML
25 INJECTION, SOLUTION INTRAMUSCULAR; INTRAVENOUS EVERY 5 MIN PRN
Status: DISCONTINUED | OUTPATIENT
Start: 2023-01-18 | End: 2023-01-18 | Stop reason: HOSPADM

## 2023-01-18 RX ORDER — LIDOCAINE 40 MG/G
CREAM TOPICAL
Status: DISCONTINUED | OUTPATIENT
Start: 2023-01-18 | End: 2023-01-19 | Stop reason: HOSPADM

## 2023-01-18 RX ORDER — NEOSTIGMINE METHYLSULFATE 1 MG/ML
VIAL (ML) INJECTION PRN
Status: DISCONTINUED | OUTPATIENT
Start: 2023-01-18 | End: 2023-01-18

## 2023-01-18 RX ORDER — ATENOLOL 25 MG/1
50 TABLET ORAL DAILY
Status: DISCONTINUED | OUTPATIENT
Start: 2023-01-19 | End: 2023-01-19 | Stop reason: HOSPADM

## 2023-01-18 RX ORDER — ONDANSETRON 2 MG/ML
4 INJECTION INTRAMUSCULAR; INTRAVENOUS EVERY 30 MIN PRN
Status: DISCONTINUED | OUTPATIENT
Start: 2023-01-18 | End: 2023-01-18 | Stop reason: HOSPADM

## 2023-01-18 RX ORDER — PROCHLORPERAZINE MALEATE 5 MG
5 TABLET ORAL EVERY 6 HOURS PRN
Status: DISCONTINUED | OUTPATIENT
Start: 2023-01-18 | End: 2023-01-19 | Stop reason: HOSPADM

## 2023-01-18 RX ORDER — ONDANSETRON 4 MG/1
4 TABLET, ORALLY DISINTEGRATING ORAL EVERY 30 MIN PRN
Status: DISCONTINUED | OUTPATIENT
Start: 2023-01-18 | End: 2023-01-18 | Stop reason: HOSPADM

## 2023-01-18 RX ORDER — HYDROMORPHONE HCL IN WATER/PF 6 MG/30 ML
0.4 PATIENT CONTROLLED ANALGESIA SYRINGE INTRAVENOUS
Status: DISCONTINUED | OUTPATIENT
Start: 2023-01-18 | End: 2023-01-19 | Stop reason: HOSPADM

## 2023-01-18 RX ORDER — BISACODYL 10 MG
10 SUPPOSITORY, RECTAL RECTAL DAILY PRN
Status: DISCONTINUED | OUTPATIENT
Start: 2023-01-18 | End: 2023-01-19 | Stop reason: HOSPADM

## 2023-01-18 RX ORDER — ACETAMINOPHEN 325 MG/1
975 TABLET ORAL ONCE
Status: COMPLETED | OUTPATIENT
Start: 2023-01-18 | End: 2023-01-18

## 2023-01-18 RX ORDER — CITALOPRAM HYDROBROMIDE 10 MG/1
40 TABLET ORAL DAILY
Status: DISCONTINUED | OUTPATIENT
Start: 2023-01-19 | End: 2023-01-19 | Stop reason: HOSPADM

## 2023-01-18 RX ORDER — HYDROMORPHONE HYDROCHLORIDE 1 MG/ML
INJECTION, SOLUTION INTRAMUSCULAR; INTRAVENOUS; SUBCUTANEOUS PRN
Status: DISCONTINUED | OUTPATIENT
Start: 2023-01-18 | End: 2023-01-18

## 2023-01-18 RX ORDER — AMOXICILLIN 250 MG
1 CAPSULE ORAL 2 TIMES DAILY
Status: DISCONTINUED | OUTPATIENT
Start: 2023-01-18 | End: 2023-01-19 | Stop reason: HOSPADM

## 2023-01-18 RX ORDER — POTASSIUM CHLORIDE 750 MG/1
20 TABLET, EXTENDED RELEASE ORAL DAILY
Status: DISCONTINUED | OUTPATIENT
Start: 2023-01-19 | End: 2023-01-19 | Stop reason: HOSPADM

## 2023-01-18 RX ORDER — HYDROMORPHONE HCL IN WATER/PF 6 MG/30 ML
0.2 PATIENT CONTROLLED ANALGESIA SYRINGE INTRAVENOUS
Status: DISCONTINUED | OUTPATIENT
Start: 2023-01-18 | End: 2023-01-19 | Stop reason: HOSPADM

## 2023-01-18 RX ORDER — ACETAMINOPHEN 325 MG/1
975 TABLET ORAL EVERY 8 HOURS
Status: DISCONTINUED | OUTPATIENT
Start: 2023-01-18 | End: 2023-01-19 | Stop reason: HOSPADM

## 2023-01-18 RX ADMIN — ACETAMINOPHEN 975 MG: 325 TABLET, FILM COATED ORAL at 14:25

## 2023-01-18 RX ADMIN — DEXAMETHASONE SODIUM PHOSPHATE 4 MG: 4 INJECTION, SOLUTION INTRA-ARTICULAR; INTRALESIONAL; INTRAMUSCULAR; INTRAVENOUS; SOFT TISSUE at 08:15

## 2023-01-18 RX ADMIN — TRANEXAMIC ACID 1950 MG: 650 TABLET ORAL at 05:59

## 2023-01-18 RX ADMIN — MIDAZOLAM 1 MG: 1 INJECTION INTRAMUSCULAR; INTRAVENOUS at 07:40

## 2023-01-18 RX ADMIN — PHENYLEPHRINE HYDROCHLORIDE 100 MCG: 10 INJECTION INTRAVENOUS at 08:30

## 2023-01-18 RX ADMIN — PHENYLEPHRINE HYDROCHLORIDE 50 MCG: 10 INJECTION INTRAVENOUS at 10:34

## 2023-01-18 RX ADMIN — PROPOFOL 30 MCG/KG/MIN: 10 INJECTION, EMULSION INTRAVENOUS at 08:05

## 2023-01-18 RX ADMIN — KETOROLAC TROMETHAMINE 15 MG: 15 INJECTION, SOLUTION INTRAMUSCULAR; INTRAVENOUS at 23:33

## 2023-01-18 RX ADMIN — PHENYLEPHRINE HYDROCHLORIDE 150 MCG: 10 INJECTION INTRAVENOUS at 10:00

## 2023-01-18 RX ADMIN — SODIUM CHLORIDE, POTASSIUM CHLORIDE, SODIUM LACTATE AND CALCIUM CHLORIDE: 600; 310; 30; 20 INJECTION, SOLUTION INTRAVENOUS at 08:31

## 2023-01-18 RX ADMIN — GLYCOPYRROLATE 0.2 MG: 0.2 INJECTION, SOLUTION INTRAMUSCULAR; INTRAVENOUS at 08:11

## 2023-01-18 RX ADMIN — CEFAZOLIN SODIUM 2 G: 2 INJECTION, SOLUTION INTRAVENOUS at 16:04

## 2023-01-18 RX ADMIN — SENNOSIDES AND DOCUSATE SODIUM 1 TABLET: 50; 8.6 TABLET ORAL at 14:26

## 2023-01-18 RX ADMIN — PHENYLEPHRINE HYDROCHLORIDE 100 MCG: 10 INJECTION INTRAVENOUS at 08:52

## 2023-01-18 RX ADMIN — LIDOCAINE HYDROCHLORIDE 100 MG: 20 INJECTION, SOLUTION INFILTRATION; PERINEURAL at 08:02

## 2023-01-18 RX ADMIN — PHENYLEPHRINE HYDROCHLORIDE 100 MCG: 10 INJECTION INTRAVENOUS at 09:16

## 2023-01-18 RX ADMIN — PROPOFOL 180 MG: 10 INJECTION, EMULSION INTRAVENOUS at 08:02

## 2023-01-18 RX ADMIN — NEOSTIGMINE METHYLSULFATE 4 MG: 1 INJECTION, SOLUTION INTRAVENOUS at 10:40

## 2023-01-18 RX ADMIN — OXYCODONE HYDROCHLORIDE 2.5 MG: 5 TABLET ORAL at 21:43

## 2023-01-18 RX ADMIN — ACETAMINOPHEN 975 MG: 325 TABLET, FILM COATED ORAL at 05:58

## 2023-01-18 RX ADMIN — SODIUM CHLORIDE, POTASSIUM CHLORIDE, SODIUM LACTATE AND CALCIUM CHLORIDE: 600; 310; 30; 20 INJECTION, SOLUTION INTRAVENOUS at 06:29

## 2023-01-18 RX ADMIN — ACETAMINOPHEN 975 MG: 325 TABLET, FILM COATED ORAL at 21:43

## 2023-01-18 RX ADMIN — BUSPIRONE HYDROCHLORIDE 7.5 MG: 5 TABLET ORAL at 21:42

## 2023-01-18 RX ADMIN — CEFAZOLIN SODIUM 2 G: 2 INJECTION, SOLUTION INTRAVENOUS at 23:33

## 2023-01-18 RX ADMIN — HYDROMORPHONE HYDROCHLORIDE 0.2 MG: 1 INJECTION, SOLUTION INTRAMUSCULAR; INTRAVENOUS; SUBCUTANEOUS at 10:45

## 2023-01-18 RX ADMIN — FENTANYL CITRATE 50 MCG: 50 INJECTION, SOLUTION INTRAMUSCULAR; INTRAVENOUS at 08:02

## 2023-01-18 RX ADMIN — GLYCOPYRROLATE 0.6 MG: 0.2 INJECTION, SOLUTION INTRAMUSCULAR; INTRAVENOUS at 10:40

## 2023-01-18 RX ADMIN — ONDANSETRON 4 MG: 2 INJECTION INTRAMUSCULAR; INTRAVENOUS at 10:36

## 2023-01-18 RX ADMIN — PHENYLEPHRINE HYDROCHLORIDE 100 MCG: 10 INJECTION INTRAVENOUS at 09:49

## 2023-01-18 RX ADMIN — Medication 2 G: at 07:42

## 2023-01-18 RX ADMIN — PHENYLEPHRINE HYDROCHLORIDE 150 MCG: 10 INJECTION INTRAVENOUS at 08:11

## 2023-01-18 RX ADMIN — HYDROMORPHONE HYDROCHLORIDE 0.3 MG: 1 INJECTION, SOLUTION INTRAMUSCULAR; INTRAVENOUS; SUBCUTANEOUS at 08:39

## 2023-01-18 RX ADMIN — MIDAZOLAM 2 MG: 1 INJECTION INTRAMUSCULAR; INTRAVENOUS at 07:09

## 2023-01-18 RX ADMIN — ROCURONIUM BROMIDE 50 MG: 50 INJECTION, SOLUTION INTRAVENOUS at 08:02

## 2023-01-18 RX ADMIN — SENNOSIDES AND DOCUSATE SODIUM 1 TABLET: 50; 8.6 TABLET ORAL at 21:43

## 2023-01-18 RX ADMIN — ASPIRIN 325 MG: 325 TABLET, COATED ORAL at 14:26

## 2023-01-18 RX ADMIN — PHENYLEPHRINE HYDROCHLORIDE 100 MCG: 10 INJECTION INTRAVENOUS at 09:31

## 2023-01-18 RX ADMIN — PHENYLEPHRINE HYDROCHLORIDE 100 MCG: 10 INJECTION INTRAVENOUS at 10:25

## 2023-01-18 RX ADMIN — FENTANYL CITRATE 50 MCG: 50 INJECTION, SOLUTION INTRAMUSCULAR; INTRAVENOUS at 08:23

## 2023-01-18 RX ADMIN — KETOROLAC TROMETHAMINE 15 MG: 15 INJECTION, SOLUTION INTRAMUSCULAR; INTRAVENOUS at 17:59

## 2023-01-18 RX ADMIN — PHENYLEPHRINE HYDROCHLORIDE 100 MCG: 10 INJECTION INTRAVENOUS at 10:13

## 2023-01-18 ASSESSMENT — ACTIVITIES OF DAILY LIVING (ADL)
ADLS_ACUITY_SCORE: 32
ADLS_ACUITY_SCORE: 32
ADLS_ACUITY_SCORE: 20
ADLS_ACUITY_SCORE: 32
ADLS_ACUITY_SCORE: 32
ADLS_ACUITY_SCORE: 20
ADLS_ACUITY_SCORE: 20
ADLS_ACUITY_SCORE: 35
ADLS_ACUITY_SCORE: 32

## 2023-01-18 NOTE — ANESTHESIA CARE TRANSFER NOTE
Patient: Ely Pollack    Procedure: Procedure(s):  revision right total knee arthroplasty, total component revision for aseptic loosening       Diagnosis: Pain in prosthetic joint (H) [T84.84XA]  Diagnosis Additional Information: No value filed.    Anesthesia Type:   General    Note:    Oropharynx: oropharynx clear of all foreign objects and spontaneously breathing  Level of Consciousness: awake  Oxygen Supplementation: face mask  Level of Supplemental Oxygen (L/min / FiO2): 6  Independent Airway: airway patency satisfactory and stable  Dentition: dentition unchanged  Vital Signs Stable: post-procedure vital signs reviewed and stable  Report to RN Given: handoff report given  Patient transferred to: PACU    Handoff Report: Identifed the Patient, Identified the Reponsible Provider, Reviewed the pertinent medical history, Discussed the surgical course, Reviewed Intra-OP anesthesia mangement and issues during anesthesia, Set expectations for post-procedure period and Allowed opportunity for questions and acknowledgement of understanding      Vitals:  Vitals Value Taken Time   /63 01/18/23 1104   Temp     Pulse 71 01/18/23 1109   Resp 14 01/18/23 1109   SpO2 100 % 01/18/23 1109   Vitals shown include unvalidated device data.    Electronically Signed By: CARLOS Cao CRNA  January 18, 2023  11:10 AM

## 2023-01-18 NOTE — ANESTHESIA POSTPROCEDURE EVALUATION
Patient: Ely Pollack    Procedure: Procedure(s):  revision right total knee arthroplasty, total component revision for aseptic loosening       Anesthesia Type:  General    Note:     Postop Pain Control: Uneventful            Sign Out: Well controlled pain   PONV: No   Neuro/Psych: Uneventful            Sign Out: Acceptable/Baseline neuro status   Airway/Respiratory: Uneventful            Sign Out: Acceptable/Baseline resp. status   CV/Hemodynamics: Uneventful            Sign Out: Acceptable CV status; No obvious hypovolemia; No obvious fluid overload   Other NRE: NONE   DID A NON-ROUTINE EVENT OCCUR? No           Last vitals:  Vitals Value Taken Time   /69 01/18/23 1200   Temp 36.2  C (97.2  F) 01/18/23 1105   Pulse 62 01/18/23 1208   Resp 11 01/18/23 1208   SpO2 96 % 01/18/23 1208   Vitals shown include unvalidated device data.    Electronically Signed By: Viktor Cassidy MD  January 18, 2023  12:10 PM

## 2023-01-18 NOTE — INTERVAL H&P NOTE
"I have reviewed the surgical (or preoperative) H&P that is linked to this encounter, and examined the patient. There are no significant changes    Clinical Conditions Present on Arrival:  Clinically Significant Risk Factors Present on Admission           # Hyponatremia: Lowest Na = 134.3 mmol/L in last 30 days, will monitor as appropriate          # Obesity: Estimated body mass index is 33.33 kg/m  as calculated from the following:    Height as of 1/6/23: 1.664 m (5' 5.5\").    Weight as of 1/6/23: 92.3 kg (203 lb 6.4 oz).       "

## 2023-01-18 NOTE — PROGRESS NOTES
Dr. Cassidy approved pt to Tx to 2305.  A&Ox4, 2L NC--lungs clear, Tele: SR c/ 1st AVB, bladder scanned for 204cc, CMS intact--palpable pulses, wound vac in place, denies pain, tolerating sips/chips, spouse Madhu updated.

## 2023-01-18 NOTE — ANESTHESIA PROCEDURE NOTES
Airway       Patient location: [facility] - Operating Room or Procedural Area.       Procedure Start/Stop Times: 1/18/2023 8:04 AM  Staff -        Anesthesiologist:  Harvey Dias MD       CRNA: Virginia Calderon APRN CRNA       Performed By: CRNA  Consent for Airway        Urgency: elective  Indications and Patient Condition       Indications for airway management: delvis-procedural and airway protection       Induction type:intravenous       Mask difficulty assessment: 1 - vent by mask    Final Airway Details       Final airway type: endotracheal airway       Successful airway: ETT - single  Endotracheal Airway Details        ETT size (mm): 7.0       Cuffed: yes       Cuff volume (mL): 7       Successful intubation technique: video laryngoscopy       VL Blade Size: Glidescope 3       Grade View of Cords: 1       Adjucts: stylet       Position: Right       Measured from: gums/teeth       Secured at (cm): 21       Bite block used: None    Post intubation assessment        Placement verified by: capnometry, equal breath sounds and chest rise        Number of attempts at approach: 1       Number of other approaches attempted: 0       Secured with: pink tape       Ease of procedure: easy       Dentition: Intact and Unchanged    Medication(s) Administered   Medication Administration Time: 1/18/2023 8:04 AM

## 2023-01-18 NOTE — PLAN OF CARE
Goal Outcome Evaluation:      Plan of Care Reviewed With: patient, spouse    Overall Patient Progress: improvingOverall Patient Progress: improving    Outcome Evaluation: Surgery complete    Date/Time: 1/18 3-11:30 shift    Trauma/Ortho/Medical (Choose one) Ortho    Diagnosis: R TKA revision  POD#:0   Mental Status: A&O x4  Activity/dangle: Ax1 gait belt and walker. PT done.   Diet: Regular  Pain: Moderate, wants to avoid narcotics but took 2.5 mg oxycodone before bed.  [place]/Voiding: Voiding in bathroom  Tele/Restraints/Iso: No  02/LDA: R PIV infusing 75 mL/hr LR  D/C Date: Estimated 1 day

## 2023-01-18 NOTE — OP NOTE
Procedure Date: 01/18/2023    PREOPERATIVE DIAGNOSES:    1.  Right total knee arthroplasty instability.  2.  Concern for aseptic loosening, right total knee arthroplasty.    POSTOPERATIVE DIAGNOSES:    1.  Right total knee arthroplasty instability.  2.  Aseptic loosening, right femoral component, total knee arthroplasty.    PROCEDURE PERFORMED:  Revision right total knee arthroplasty, femoral and tibial components with sleeves and stems.    SURGEON:  Ben Lindsay M.D.     ASSISTANTS:  ALEN Mclean, whose assistance was critical for positioning the patient, retraction during exposure, resection arthroplasty, revision arthroplasty, closure.    IMPLANTS USED:  DePuy revision system with a size right femur 4 mm posterior augments and 16 mm stem with 30 mm sleeve, size 4 rotating platform base with smallest sleeve available and 29 mm tibial sleeve.  Ten mm rotating platform polyethylene.  Stimulan beads mixed with vancomycin and tobramycin.    INDICATIONS FOR PROCEDURE:  This is a 72-year-old female who had a total knee arthroplasty with a partner surgeon approximately 6 to 7 years ago.  She has been having increasing pain and instability.  Workup including bone scan showed some concern for aseptic loosening.  All her preoperative laboratory studies were normal and her intraoperative cell count was normal at less than 800 with no signs of infection.  Increased instability to varus and valgus.  Risks of her condition and revision surgery discussed include but not limited to bleeding, infection, damage to surrounding neurovascular structures, fracture stiffness, continued pain, need for additional surgeries, blood clots that go to the heart, lung, or brain, anesthetic complications.  She understands and wished to proceed.  Consent signed.    DESCRIPTION OF PROCEDURE:  The patient was identified in preoperative holding area per hospital policy, correct operative site marked.  She had been given a block, to the OR  and OR table, spinal attempted.  Ultimately general anesthetic induced.  Chlorhexidine prescrub, ChloraPrep, draped.  A timeout performed, all in the room agreed.  Leg exsanguinated for a total of 17 minutes during the initial part of the exposure.  Dissected through skin, subcutaneous tissue, raising flaps.  Aspirated the knee.  Clear yellow synovial fluid, 10 mL.  No signs of infection.  Sent for stat cell count, which came back at 788, consistent with aseptic total knee arthroplasty.  I performed a medial parapatellar arthrotomy.  There was some thickened synovium superiorly in the gutters, which was taken down and sent for cultures.  I performed a medial release and established medial and lateral gutters.  The polyethylene was intact.  The knee was grossly unstable to varus and valgus stress in both extension and mid flexion.  We used a lipped tamp and struck the femur lightly and the component was dislodged, thus aseptic loosening of the femoral component.  Left it in place to protect while proceeding with tibial resection, removed the polyethylene without difficulty.  Externally rotated the tibia and used a combination of a reciprocating saw blade and stacked osteotomes to remove the slightly subsided tibia.  The tibia was well fixed.  I was eventually able to remove the tibia with minimal bone loss.  There was some posterior debonding of the cement from the implant.  We removed all the tibial cement.  Now continued with removing the femur.  There was some small posterior medial bone loss from the condyle.  Condyle was intact.  We removed the cement from the femur.  Now reamed up and down to 16 mm, both for the tibia and the femur, with good chatter.  I turned attention to preparing for the tibia, removing the sclerotic bone and cement from the canal, reaming and broaching for the smallest 29 mm sleeve.  Flat cut the tibia off of this, while protecting collateral structures.  With appropriate rotation in  relation to the tibial tubercle and for maximal tibia coverage, used a size 4 stem and prepared for the keel.    I now turned attention to the femur, preparing for the smallest sleeve available.  It appeared that the distal femoral cut had taken more medially than this 5-degree angled cut.  I did resect some additional distal femur laterally at a 5-degree angle.  Then prepared the 4-in-1 cutting guide, cutting chamfers and posteriorly preparing for 4 mm augments medial and lateral.  The knee was then trialed and came out to full extension with a 10 mm rotating platform, stable to varus and valgus, full extension, mid flexion easily flexed over 125 degrees with appropriate patellar tracking.  The patellar component appeared well fixed and the nucleated cell count came back low, so elected to leave that in place.  I now inflated the tourniquet and again as it had been down after the initial 17 minutes.  I performed a rush Betadine lavage protocol.  Copiously irrigated, removed all the trials.  Assembled the actual components, press fit the stems and metaphyseal sleeves with cement at the bone surface and cement to make up the posterior medial condyle defect.  All excess cement removed, the knee allowed to harden in full extension.  Same stability exam as trialed, and ultimately selected a 10 mm rotating platform polyethylene.      The Stimulan beads were placed deep.  Closed with Ethibond and then a #1 Stratafix with the knee in flexion up to 115 degrees.  Then closed with 0 Vicryl, 2-0 Vicryl, 3-0 barbed Monocryl, staples, Prevena dressing applied.  Awakened, transferred stable to PACU.    POSTOPERATIVE PLAN:     1.  Weightbearing as tolerated.  2.  Two weeks Ancef in-house for 24 hours, in 2 weeks doxycycline, while we follow cultures.  If the culture should shift positive, we will transition her to a 6-week course of IV antibiotics.  Appears aseptic at this time.   3.  DVT prophylaxis will be aspirin enteric coated  325 mg daily x6 weeks.  4.  Knee immobilizer for the first week, then knee range of motion as tolerated.  5.  PT and OT.  6.  Leave the Prevena dressing in place until 2-week followup in my clinic.    Ben Lindsay MD        D: 2023   T: 2023   MT: SALUD    Name:     NUHA SAUCEDO  MRN:      [id number]        Account:        [id number]   :      1950           Procedure Date: 2023     Document: [id number]

## 2023-01-18 NOTE — PROGRESS NOTES
Patient vital signs are at baseline: Yes  Patient able to ambulate as they were prior to admission or with assist devices provided by therapies during their stay:  No,  Reason:  Have not gotten out of bed yet.  Patient MUST void prior to discharge:  No,  Reason:  Due to void  Patient able to tolerate oral intake:  Yes  Pain has adequate pain control using Oral analgesics:  Yes  Does patient have an identified :  Yes  Has goal D/C date and time been discussed with patient:  Yes

## 2023-01-18 NOTE — BRIEF OP NOTE
[facility]    Brief Operative Note    Pre-operative diagnosis: Pain in prosthetic joint (H) [T84.84XA]  Post-operative diagnosis Same as pre-operative diagnosis, aseptic loosening right femoral component and instability.      Procedure: Procedure(s):  revision right total knee arthroplasty, total component revision for aseptic loosening  Surgeon: Surgeon(s) and Role:     * Ben Lindsay MD - Primary     * Danilo Pompa PA-C - Assisting  Anesthesia: Spinal   Estimated Blood Loss: Less than 100 ml    Drains: None  Specimens:   ID Type Source Tests Collected by Time Destination   A : right knee synovial fluid Synovial fluid Knee, Right ANAEROBIC BACTERIAL CULTURE ROUTINE, CELL COUNT WITH DIFFERENTIAL FLUID, GRAM STAIN, AEROBIC BACTERIAL CULTURE ROUTINE Ben Lindsay MD 1/18/2023  8:25 AM    B : right knee medial gutter Tissue Knee, Right ANAEROBIC BACTERIAL CULTURE ROUTINE, AEROBIC BACTERIAL CULTURE ROUTINE Ben Lindsay MD 1/18/2023  8:35 AM    C : right knee lateral gutter Tissue Knee, Right ANAEROBIC BACTERIAL CULTURE ROUTINE, AEROBIC BACTERIAL CULTURE ROUTINE Ben Lindsay MD 1/18/2023  8:39 AM    D : right knee fat pad Tissue Knee, Right ANAEROBIC BACTERIAL CULTURE ROUTINE, AEROBIC BACTERIAL CULTURE ROUTINE Ben Lindsay MD 1/18/2023  8:40 AM    E : right knee posterior capsule Tissue Knee, Right ANAEROBIC BACTERIAL CULTURE ROUTINE, AEROBIC BACTERIAL CULTURE ROUTINE Ben Lindsay MD 1/18/2023  8:41 AM    F : right knee tibial canal Tissue Knee, Right ANAEROBIC BACTERIAL CULTURE ROUTINE, AEROBIC BACTERIAL CULTURE ROUTINE Ben Lindsay MD 1/18/2023  8:55 AM    G : right femur Tissue Femur, Right ANAEROBIC BACTERIAL CULTURE ROUTINE, AEROBIC BACTERIAL CULTURE ROUTINE Ben Lindsay MD 1/18/2023  9:07 AM      Findings:   Femoral component loose, tibia slightly subsided, unstable tka.  No signs of infx.   .  Complications: None.  Implants:   Implant Name Type Inv. Item Serial No.  Lot No. LRB No. Used Action   BONE CEMENT SIMPLEX W/TOBRAMYCIN 6197-9-001 - [id number] [id number], Bone BONE CEMENT SIMPLEX W/TOBRAMYCIN 6197-9-001  [employer] [id number] Right 2 Implanted   GRAFT BONE STIMULAN MATRIX KIT RAPID CURE 10ML 620-010 - [id number]  GRAFT BONE STIMULAN MATRIX KIT RAPID CURE 10ML 620-010  [employer] [id number] Right 1 Implanted   iBalance TKA Femoral Implant CR Cemented Size 4 II RIght    [employer] [id number] Right 1 Explanted   iBalance TKA TIbial Bearing Implant CR SIze 3     [employer] [id number] Right 1 Explanted   iBalance TKA tibial Tray Modular Size 3    [employer] [id number] Right 1 Explanted   ATTUNE POS FEM AUG SZ 5 4MM - [id number] Total Joint Component/Insert ATTUNE POS FEM AUG SZ 5 4MM  [employer]- [id number] Right 1 Implanted   ATUNE PRESSFIT STR [id number] - [id number] Total Joint Component/Insert ATUNE PRESSFIT STR [id number]  [employer]- [id number] Right 2 Implanted   ATUN FEM SLV M/L 30MM HALF POR 1511- - [id number] Total Joint Component/Insert ATUN FEM SLV M/L 30MM HALF POR 1511-  [employer]- [id number] Right 1 Implanted   BASEPLATE TIBIAL SZ-4 METALLIC ROTATING PLATFORM CEMENTED - [id number] Total Joint Component/Insert BASEPLATE TIBIAL SZ-4 METALLIC ROTATING PLATFORM CEMENTED  [employer]- [id number] Right 1 Implanted   ATTUNE CRS FEMORAL RT SZ 5 NASIM - [id number] Total Joint Component/Insert ATTUNE CRS FEMORAL RT SZ 5 NASIM  [employer]- [id number] Right 1 Implanted   KIT TKR ATTUNE REVISION TIBIAL SLEEVE POROCOAT PARTIALLY COA - [id number] Total Joint Component/Insert KIT TKR ATTUNE REVISION TIBIAL SLEEVE POROCOAT PARTIALLY COA  [employer]- [id number] Right 1 Implanted   ATTUNE POS FEM AUG SZ 5 4MM - [id number] Total Joint Component/Insert ATTUNE POS FEM AUG SZ 5 4MM  [employer]- [id number] Right 1 Implanted   ATTUNE CRS RP INSRT SZ 5 10MM -  [id number] Total Joint Component/Insert ATTUNE CRS RP INSRT SZ 5 10MM  [employer] [facility]- [id number] Right 1 Implanted       Plan:  WBAT with a walker  KI x 1 week  PT/OT  Ancef x 24H then PO abx x 2 weeks while we follow revision cultures  DVT prophy -  mg EC daily x 6 weeks  Preevna dressing in place until follow up  Follow up 2 weeks  [facility]  [facility]    Monday 1-5 PM    [address] [place] [address]  [phone]    [phone]    Or    Thursday 7:30-12:00 AM orTuesday 7:30-5 PM  [address]    [address]  [place], [place]

## 2023-01-18 NOTE — ANESTHESIA PREPROCEDURE EVALUATION
Anesthesia Pre-Procedure Evaluation    Patient: Ely Pollack   MRN: [id number] : 1950        Procedure : Procedure(s):  revision right total knee arthroplasty , polyethylene exchange verses total component revision for aseptic loosening          Past Medical History:   Diagnosis Date     Arthritis      Hypertension     Cardiology No     Other chronic pain     Pain for 10 years     PONV (postoperative nausea and vomiting)      Uncomplicated asthma       Past Surgical History:   Procedure Laterality Date     ARTHROPLASTY KNEE BILATERAL Bilateral 3/29/2016    Procedure: ARTHROPLASTY KNEE BILATERAL;  Surgeon: Darryn Cortez MD;  Location: [facility] OR     AS DECOMPRESS SPINAL CORD,1 SEG      Thorasic     AS SPINAL FUSION,ANT,EA ADNL LEVEL      Thorasic     BUNIONECTOMY Left       Allergies   Allergen Reactions     Bee Venom Anaphylaxis     Clavulanic Acid Diarrhea     Oxycodone Nausea and Vomiting     Does ok with 1/2 pill     Seasonal Allergies      Latex Rash      Social History     Tobacco Use     Smoking status: Never     Smokeless tobacco: Never   Substance Use Topics     Alcohol use: Yes     Comment: 3 drinks weekly      Wt Readings from Last 1 Encounters:   23 92.3 kg (203 lb 6.4 oz)        Anesthesia Evaluation   Pt has had prior anesthetic.     History of anesthetic complications  - PONV.      ROS/MED HX  ENT/Pulmonary:     (+) asthma Treatment: Inhaler prn,   (-) sleep apnea   Neurologic:  - neg neurologic ROS     Cardiovascular:     (+) hypertension-----    METS/Exercise Tolerance:     Hematologic:       Musculoskeletal: Comment: Back surgery      GI/Hepatic:    (-) GERD   Renal/Genitourinary:  - neg Renal ROS     Endo:     (+) Obesity,  (-) Type II DM   Psychiatric/Substance Use:     (+) psychiatric history anxiety and depression     Infectious Disease:       Malignancy:       Other:      (+) , H/O Chronic Pain,        Physical Exam    Airway        Mallampati: II   TM distance: > 3 FB   Neck  ROM: full   Mouth opening: > 3 cm    Respiratory Devices and Support         Dental       (+) caps      Cardiovascular   cardiovascular exam normal          Pulmonary   pulmonary exam normal                OUTSIDE LABS:  CBC:   Lab Results   Component Value Date    WBC 5.5 01/06/2023    WBC 9.7 12/09/2022    HGB 13.8 01/06/2023    HGB 13.6 12/09/2022    HCT 43.0 01/06/2023    HCT 41.0 12/09/2022     01/06/2023     12/09/2022     BMP:   Lab Results   Component Value Date    .3 (A) 01/06/2023    .5 (A) 12/09/2022    POTASSIUM 4.72 01/06/2023    POTASSIUM 3.85 12/09/2022    CHLORIDE 95.9 (A) 01/06/2023    CHLORIDE 89.6 (A) 12/09/2022    CO2 31.6 01/06/2023    CO2 30.9 12/09/2022    BUN 13 01/06/2023    BUN 17.1 01/06/2023    CR 0.76 01/06/2023    CR 0.78 12/09/2022    GLC 96 01/06/2023     (A) 12/09/2022     COAGS:   Lab Results   Component Value Date    PTT 28 01/16/2005    INR 1.28 (H) 01/16/2005     POC: No results found for: BGM, HCG, HCGS  HEPATIC:   Lab Results   Component Value Date    ALBUMIN 4.0 10/13/2022    PROTTOTAL 7.0 10/13/2022    ALKPHOS 78 10/13/2022    BILITOTAL 0.7 10/13/2022     OTHER:   Lab Results   Component Value Date    A1C 5.5 10/13/2022    LIDYA 9.3 01/06/2023    MAG 1.9 02/12/2021    CRP 4.42 09/20/2022    SED 10 09/20/2022       Anesthesia Plan    ASA Status:  2   NPO Status:  NPO Appropriate    Anesthesia Type: General.     - Airway: ETT   Induction: Intravenous, Propofol.   Maintenance: Balanced.        Consents    Anesthesia Plan(s) and associated risks, benefits, and realistic alternatives discussed. Questions answered and patient/representative(s) expressed understanding.    - Discussed:     - Discussed with:  Patient         Postoperative Care    Pain management: IV analgesics, Peripheral nerve block (Single Shot).   PONV prophylaxis: Ondansetron (or other 5HT-3), Background Propofol Infusion, Dexamethasone or Solumedrol     Comments:                 Harvey Dias MD

## 2023-01-19 ENCOUNTER — APPOINTMENT (OUTPATIENT)
Dept: PHYSICAL THERAPY | Facility: CLINIC | Age: 73
DRG: 467 | End: 2023-01-19
Attending: ORTHOPAEDIC SURGERY
Payer: COMMERCIAL

## 2023-01-19 ENCOUNTER — APPOINTMENT (OUTPATIENT)
Dept: OCCUPATIONAL THERAPY | Facility: CLINIC | Age: 73
DRG: 467 | End: 2023-01-19
Attending: ORTHOPAEDIC SURGERY
Payer: COMMERCIAL

## 2023-01-19 VITALS
SYSTOLIC BLOOD PRESSURE: 120 MMHG | HEIGHT: 65 IN | OXYGEN SATURATION: 93 % | TEMPERATURE: 98.1 F | HEART RATE: 78 BPM | BODY MASS INDEX: 34.49 KG/M2 | RESPIRATION RATE: 16 BRPM | WEIGHT: 207 LBS | DIASTOLIC BLOOD PRESSURE: 63 MMHG

## 2023-01-19 LAB
GLUCOSE BLDC GLUCOMTR-MCNC: 115 MG/DL (ref 70–99)
HGB BLD-MCNC: 9.9 G/DL (ref 11.7–15.7)

## 2023-01-19 PROCEDURE — 97530 THERAPEUTIC ACTIVITIES: CPT | Mod: GP | Performed by: PHYSICAL THERAPY ASSISTANT

## 2023-01-19 PROCEDURE — 36415 COLL VENOUS BLD VENIPUNCTURE: CPT | Performed by: PHYSICIAN ASSISTANT

## 2023-01-19 PROCEDURE — 97535 SELF CARE MNGMENT TRAINING: CPT | Mod: GO

## 2023-01-19 PROCEDURE — 250N000013 HC RX MED GY IP 250 OP 250 PS 637: Performed by: PHYSICIAN ASSISTANT

## 2023-01-19 PROCEDURE — 85018 HEMOGLOBIN: CPT | Performed by: PHYSICIAN ASSISTANT

## 2023-01-19 PROCEDURE — 97116 GAIT TRAINING THERAPY: CPT | Mod: GP | Performed by: PHYSICAL THERAPY ASSISTANT

## 2023-01-19 PROCEDURE — 97165 OT EVAL LOW COMPLEX 30 MIN: CPT | Mod: GO

## 2023-01-19 PROCEDURE — 250N000011 HC RX IP 250 OP 636: Performed by: PHYSICIAN ASSISTANT

## 2023-01-19 PROCEDURE — 97110 THERAPEUTIC EXERCISES: CPT | Mod: GP | Performed by: PHYSICAL THERAPY ASSISTANT

## 2023-01-19 RX ADMIN — OXYCODONE HYDROCHLORIDE 2.5 MG: 5 TABLET ORAL at 04:47

## 2023-01-19 RX ADMIN — POLYETHYLENE GLYCOL 3350 17 G: 17 POWDER, FOR SOLUTION ORAL at 09:13

## 2023-01-19 RX ADMIN — SENNOSIDES AND DOCUSATE SODIUM 1 TABLET: 50; 8.6 TABLET ORAL at 09:13

## 2023-01-19 RX ADMIN — DOXYCYCLINE HYCLATE 100 MG: 100 CAPSULE ORAL at 09:14

## 2023-01-19 RX ADMIN — OMEPRAZOLE 20 MG: 20 CAPSULE, DELAYED RELEASE ORAL at 09:13

## 2023-01-19 RX ADMIN — HYDROCHLOROTHIAZIDE 25 MG: 25 TABLET ORAL at 09:14

## 2023-01-19 RX ADMIN — ASPIRIN 325 MG: 325 TABLET, COATED ORAL at 09:13

## 2023-01-19 RX ADMIN — CITALOPRAM HYDROBROMIDE 40 MG: 10 TABLET ORAL at 09:13

## 2023-01-19 RX ADMIN — ATENOLOL 50 MG: 25 TABLET ORAL at 09:14

## 2023-01-19 RX ADMIN — OXYCODONE HYDROCHLORIDE 2.5 MG: 5 TABLET ORAL at 09:14

## 2023-01-19 RX ADMIN — BUSPIRONE HYDROCHLORIDE 7.5 MG: 5 TABLET ORAL at 09:14

## 2023-01-19 RX ADMIN — KETOROLAC TROMETHAMINE 15 MG: 15 INJECTION, SOLUTION INTRAMUSCULAR; INTRAVENOUS at 06:30

## 2023-01-19 RX ADMIN — POTASSIUM CHLORIDE 20 MEQ: 750 TABLET, EXTENDED RELEASE ORAL at 09:13

## 2023-01-19 RX ADMIN — ACETAMINOPHEN 975 MG: 325 TABLET, FILM COATED ORAL at 06:30

## 2023-01-19 ASSESSMENT — ACTIVITIES OF DAILY LIVING (ADL)
ADLS_ACUITY_SCORE: 28
ADLS_ACUITY_SCORE: 32
ADLS_ACUITY_SCORE: 28

## 2023-01-19 NOTE — PLAN OF CARE
Goal Outcome Evaluation:    Patient vital signs are at baseline: Yes, ex BP. On RA  Patient able to ambulate as they were prior to admission or with assist devices provided by therapies during their stay:  Yes, Ax1 GBW to bedside commode.  Patient MUST void prior to discharge:  Yes, and bladder scan w/ 85 & 260 mL.  Patient able to tolerate oral intake:  Yes  Pain has adequate pain control using Oral analgesics:  Yes, pain managed w/ schedule tylenol, toradol, & oxycodone     A&Ox4. CMS intact. Dressing and KI CDI. Wound vac intact. PIV SL. Will continue to monitor.

## 2023-01-19 NOTE — PROGRESS NOTES
01/18/23 1700   Appointment Info   Signing Clinician's Name / Credentials (PT) Willie King, PT, DPT   Living Environment   People in Home spouse   Current Living Arrangements   ([facility])   Home Accessibility stairs to enter home;stairs within home   Number of Stairs, Main Entrance 2   Stair Railings, Main Entrance railings on both sides of stairs   Number of Stairs, Within Home, Primary one   Stair Railings, Within Home, Primary railings safe and in good condition   Transportation Anticipated family or friend will provide   Living Environment Comments Pt lives with spouse in single level [facility] with 2 BOLA, 1 step inside to a living room. Pt will have good 24/7 assist from spouse   Self-Care   Usual Activity Tolerance moderate   Current Activity Tolerance moderate   Equipment Currently Used at Home cane, straight   Fall history within last six months yes   Number of times patient has fallen within last six months   (2)   Activity/Exercise/Self-Care Comment Pt has been using SEC for amb last 3 months, IND at baseline. IND with ADL's and pt still drives   General Information   Onset of Illness/Injury or Date of Surgery 01/18/23   Referring Physician Danilo Pompa PA-C   Patient/Family Therapy Goals Statement (PT) go home   Pertinent History of Current Problem (include personal factors and/or comorbidities that impact the POC) Pt is a 72 y.o. female s/p right TKA revision on 1/18/23, POD#0   Existing Precautions/Restrictions fall;weight bearing;other (see comments)  (no ROM right knee for 1 week with KI on at all times)   Weight-Bearing Status - RLE weight-bearing as tolerated   Cognition   Affect/Mental Status (Cognition) WNL   Orientation Status (Cognition) oriented x 4   Follows Commands (Cognition) WNL   Pain Assessment   Patient Currently in Pain Yes, see Vital Sign flowsheet   Integumentary/Edema   Integumentary/Edema no deficits were identifed   Posture    Posture Forward head position   Range of  Motion (ROM)   Range of Motion ROM deficits secondary to surgical procedure;ROM deficits secondary to pain   ROM Comment deficits with right knee after TKA and due to orders for no ROM for 1 week with KI on all times, otherwise appears grossly WFL   Strength (Manual Muscle Testing)   Strength (Manual Muscle Testing) Able to perform R SLR;Able to perform L SLR;Deficits observed during functional mobility   Strength Comments not formally assessed, deficits noted with right knee and RLE after surgery, appears grossly antigravity   Bed Mobility   Comment, (Bed Mobility) supine<>sit SBA   Transfers   Comment, (Transfers) sit<>stand with FWW CGA   Gait/Stairs (Locomotion)   [place] Level (Gait) contact guard   Assistive Device (Gait) walker, front-wheeled   Distance in Feet 10'   Distance in Feet (Gait) 20' + 20'   Pattern (Gait) step-through   Deviations/Abnormal Patterns (Gait) antalgic;hetal decreased;gait speed decreased;stride length decreased;weight shifting decreased;other (see comments)  (hip hiking on RLE)   Maintains Weight-bearing Status (Gait) able to maintain   Balance   Balance Comments normal sitting balance, good standing balance with FWW, impaired dynamic balance after TKA and need for KI on all times   Sensory Examination   Sensory Perception patient reports no sensory changes   Clinical Impression   Criteria for Skilled Therapeutic Intervention Yes, treatment indicated   PT Diagnosis (PT) impaired gait   Influenced by the following impairments pain, deficits with ROM, strength, balance, orders for no ROM on surgical knee   Functional limitations due to impairments impaired bed mobility, transfers, amb, ADL's   Clinical Presentation (PT Evaluation Complexity) Stable/Uncomplicated   Clinical Presentation Rationale PMH and clinical judgement   Clinical Decision Making (Complexity) low complexity   Planned Therapy Interventions (PT) balance training;bed mobility training;cryotherapy;gait  training;home exercise program;patient/family education;ROM (range of motion);stair training;strengthening;stretching;transfer training;progressive activity/exercise   Anticipated Equipment Needs at Discharge (PT)   (pt will provide FWW, 4WW, SEC, grab bars in toilet and shower, shower chair and raised toilet seat)   Risk & Benefits of therapy have been explained evaluation/treatment results reviewed;care plan/treatment goals reviewed;risks/benefits reviewed;current/potential barriers reviewed;participants voiced agreement with care plan;participants included;patient   PT Total Evaluation Time   PT Eval, Low Complexity Minutes (99732) 12   Plan of Care Review   Plan of Care Reviewed With patient   Physical Therapy Goals   PT Frequency 2x/day   PT Predicted Duration/Target Date for Goal Attainment 01/19/23   PT Goals Bed Mobility;Transfers;Gait;Stairs   PT: Bed Mobility Modified independent;Supine to/from sit;Rolling;Bridging   PT: Transfers Supervision/stand-by assist;Sit to/from stand;Assistive device   PT: Gait Supervision/stand-by assist;Rolling walker;50 feet   PT: Stairs Minimal assist;2 stairs;Rail on both sides   Interventions   Interventions Quick Adds Gait Training;Therapeutic Activity;Therapeutic Procedure   Therapeutic Procedure/Exercise   Ther. Procedure: strength, endurance, ROM, flexibillity Minutes (06803) 6   Symptoms Noted During/After Treatment increased pain   Treatment Detail/Skilled Intervention Educated on post surgical benefits of TE on circulation and functional ROM and strength. Left pt with TE handout. With pt in supine cued for AP's x 20, glute sets x 10, on RLE: quad sets x 10, SLR x 10   Therapeutic Activity   Therapeutic Activities: dynamic activities to improve functional performance Minutes (64941) 12   Symptoms Noted During/After Treatment Increased pain   Treatment Detail/Skilled Intervention Greeted pt supine in bed with KI already donned, educated on orders for no knee ROM for 1  week and to have KI on at all times, WBAT RLE. With HOB elevated supine<>sit SBA, increased time and effort, vcs for set up and sequencing. Stand<>sit from raised toilet with FWW SBA, cues for sequencing, walker placement hands on arm rest, pt voided and IND with pericares. Pt able to reposition in bed IND. Sit<>stand with FWW CGA, cues for safe walker and hand placement and pt able to demo back well. Increased time for line management and room set up.   Gait Training   Gait Training Minutes (30751) 8   Symptoms Noted During/After Treatment (Gait Training) fatigue;increased pain   Treatment Detail/Skilled Intervention Pt amb 20' + 20' with FWW and CGA, pt used slow steady step-to pattern with hip hiking on RLE to advance leg in KI, cues for safe walker placement and pacing. No LOB noted and pt encouraged to amb with nursing as able   [place] Level (Gait Training) contact guard   Physical Assistance Level (Gait Training) 1 person assist;verbal cues   Weight Bearing (Gait Training) weight-bearing as tolerated   Assistive Device (Gait Training) rolling walker   Gait Analysis Deviations decreased hetal;increased time in double stance;decreased velocity of limb motion;decreased step length;decreased stride length;decreased weight-shifting ability;decreased toe-to-floor clearance;decreased swing-to-stance ratio   Impairments (Gait Analysis/Training) pain;strength decreased;ROM decreased   Total Session Time   Timed Code Treatment Minutes 26   Total Session Time (sum of timed and untimed services) 38

## 2023-01-19 NOTE — PLAN OF CARE
Pt adequate for discharge. Aox4. VSS on RA. A1-GB/W; WBAT RLE. Cleared by PT/OT. Voiding adequately. Tolerating regular diet. Wound vac in place. Knee immobilizer in place. CMS intact. PIV removed. Discharge paperwork and medications explained to pt and pt's . All questions answered. No further questions. WC ride to door at discharge.

## 2023-01-19 NOTE — PLAN OF CARE
PT-  Pt discharging home today with assist of spouse as needed and eventual OP PT.  PT goals met.

## 2023-01-19 NOTE — PROGRESS NOTES
"ORTHOPEDIC LOWER EXTREMITY PROGRESS NOTE    POD#1  Patient is a 72 year old female who underwent Procedure(s):  revision right total knee arthroplasty, total component revision for aseptic loosening on 2023. Pain is well controlled. Tolerating medication well, no nausea or vomiting.  Just finished PT which went great.  Discharge instructions reviewed.    Vitals:   Blood pressure 120/63, pulse 78, temperature 98.1  F (36.7  C), temperature source Oral, resp. rate 16, height 1.651 m (5' 5\"), weight 93.9 kg (207 lb), SpO2 93 %, not currently breastfeeding.  Temp (24hrs), Av.7  F (36.5  C), Min:97.2  F (36.2  C), Max:98.1  F (36.7  C)      Drains: None    EXAM   The patient is awake and alert.  Calves are soft and non-tender.   Sensation is intact.  Dorsiflexion and plantar flexion is intact.  Foot warm with nl cap refill.  The incision is covered with prevena wound vac, empty canister.  KI in place.     Labs:   Recent Labs   Lab Test 23  0638 23  1214 22  0000   HGB 9.9* 13.8 13.6     ASSESSMENT  S/p R revision TKA   PLAN  1. DVT prophylaxis: aspirin  2. Weight Bearing: WBAT (Weight bearing as tolerated). in KI x 1 week, then ROM as tolerated  3. Anticipated discharge date today . Discharge to Home with No Skilled Service.  4. Cont Pain Control Oxycodone and Celebrex    Amparo Cui PA-C  [facility]        "

## 2023-01-19 NOTE — PROGRESS NOTES
01/19/23 0915   Appointment Info   Signing Clinician's Name / Credentials (OT) Lala Pineda, OTR/L   Living Environment   People in Home spouse   Current Living Arrangements   ([facility])   Home Accessibility stairs to enter home;stairs within home   Number of Stairs, Main Entrance 2   Stair Railings, Main Entrance railings on both sides of stairs   Number of Stairs, Within Home, Primary one   Stair Railings, Within Home, Primary railings safe and in good condition   Transportation Anticipated family or friend will provide   Living Environment Comments Pt lives with spouse in single level [facility] with 2 BOLA, 1 step inside to a living room. Pt will have good 24/7 assist from spouse. Walk in shower w/ shower chair, grab bars, RTS.   Self-Care   Usual Activity Tolerance moderate   Current Activity Tolerance moderate   Equipment Currently Used at Home cane, straight   Fall history within last six months yes   Number of times patient has fallen within last six months 2   Activity/Exercise/Self-Care Comment Pt was independent w/ ADL tasks, has been using cane for past 3 mos   Instrumental Activities of Daily Living (IADL)   IADL Comments Pt shares home mgmt tasks w/ spouse. Drives and manages her own meds   General Information   Onset of Illness/Injury or Date of Surgery 01/18/23   Referring Physician Danilo Pompa PA-C   Patient/Family Therapy Goal Statement (OT) go home   Additional Occupational Profile Info/Pertinent History of Current Problem Pt is a 72 y.o. female s/p right TKA revision on 1/18/23, POD#0   Existing Precautions/Restrictions fall  (KI at all times for 1st week)   Right Lower Extremity (Weight-bearing Status) weight-bearing as tolerated (WBAT)   Cognitive Status Examination   Orientation Status orientation to person, place and time   Visual Perception   Visual Impairment/Limitations WFL;corrective lenses full-time   Sensory   Sensory Quick Adds sensation intact   Pain Assessment   Patient  Currently in Pain Yes, see Vital Sign flowsheet  (mild discomfort at knee)   Range of Motion Comprehensive   General Range of Motion bilateral upper extremity ROM WNL   Strength Comprehensive (MMT)   General Manual Muscle Testing (MMT) Assessment no strength deficits identified   Transfers   Transfers toilet transfer;sit-stand transfer   Sit-Stand Transfer   Sit-Stand Custer (Transfers) supervision   Assistive Device (Sit-Stand Transfers) walker, standard   Toilet Transfer   Type (Toilet Transfer) stand-sit;sit-stand   Custer Level (Toilet Transfer) supervision   Assistive Device (Toilet Transfer) raised toilet seat;grab bars/safety frame;walker, standard   Balance   Balance Comments pt steady w/ FWW   Activities of Daily Living   BADL Assessment/Intervention upper body dressing;lower body dressing;toileting;grooming   Upper Body Dressing Assessment/Training   Custer Level (Upper Body Dressing) independent   Lower Body Dressing Assessment/Training   Assistive Devices (Lower Body Dressing) reacher   Custer Level (Lower Body Dressing) verbal cues;minimum assist (75% patient effort)   Grooming Assessment/Training   Custer Level (Grooming) independent   Toileting   Custer Level (Toileting) independent   Clinical Impression   Criteria for Skilled Therapeutic Interventions Met (OT) Yes, treatment indicated   OT Diagnosis decreased I/ADL independence   OT Problem List-Impairments impacting ADL problems related to;activity tolerance impaired;mobility;pain;strength   Assessment of Occupational Performance 3-5 Performance Deficits   Identified Performance Deficits decreased independence w/ dressing, bathing, mobility, driving   Planned Therapy Interventions (OT) ADL retraining;transfer training;risk factor education   Clinical Decision Making Complexity (OT) low complexity   Anticipated Equipment Needs Upon Discharge (OT)   (pt has what she needs)   Risk & Benefits of therapy have been  explained evaluation/treatment results reviewed;care plan/treatment goals reviewed;risks/benefits reviewed;current/potential barriers reviewed;participants voiced agreement with care plan;participants included;patient   OT Total Evaluation Time   OT Eval, Low Complexity Minutes (57327) 10   OT Goals   Therapy Frequency (OT) One time eval and treatment   OT Predicted Duration/Target Date for Goal Attainment 01/19/23   OT Goals Lower Body Dressing;Transfers;Toilet Transfer/Toileting;Hygiene/Grooming   OT: Hygiene/Grooming independent;while standing;Goal Met   OT: Lower Body Dressing Minimal assist;using adaptive equipment;Goal Met   OT: Transfer Supervision/stand-by assist;with assistive device;Goal Met   OT: Toilet Transfer/Toileting Supervision/stand-by assist;toilet transfer;cleaning and garment management;using adaptive equipment;Goal Met   Interventions   Interventions Quick Adds Self-Care/Home Management   Self-Care/Home Management   Self-Care/Home Mgmt/ADL, Compensatory, Meal Prep Minutes (72022) 15   Symptoms Noted During/After Treatment (Meal Preparation/Planning Training) increased pain   Treatment Detail/Skilled Intervention Pt in chair upon arrival, very pleasant and agreeable to OT. Pt stands w/ SBA and walked into bathroom w/ FWW. Pt completed toilet transfer w/ SBA using RTS and grab bars to simulate home set up. Pt voided, independent w/ clothing mgmt and pericares. Pt able to don underwear w/ Min A, VC for technique. She then stood at sink w/ SBA to wash hands. Pt returned to chair. Ind w/ UB dressing. Pt able to don pants w/ Mod I using reacher and VC - pt can get reacher from family member to use. She did need assist to don R shoe/sock d/t KI, but independent w/ L side. Discussed edema mgmt strategies and demo'd safe car transfer technique. pt w/ no further questions for OT at this time - left w/ all needs met up in chair.   OT Discharge Planning   OT Plan d/c OT   OT Rationale for DC Rec Pt  functioning below baseline as anticipated after surgery. However, she is moving well w/ FWW and able to complete basic ADL tasks w/ Mod I - Min A. Pt has good support at home from spouse and necessary equipment to facilitate safe d/c home. Recommend she have assist w/ bathing, heavy IADL tasks and driving. Pt in agreement w/ plan.   OT Brief overview of current status SBA w/ FWW, Min A LB dressing   Total Session Time   Timed Code Treatment Minutes 15   Total Session Time (sum of timed and untimed services) 25

## 2023-01-19 NOTE — PLAN OF CARE
Occupational Therapy Discharge Summary    Reason for therapy discharge:    All goals and outcomes met, no further needs identified.    Progress towards therapy goal(s). See goals on Care Plan in [facility] electronic health record for goal details.  Goals met    Therapy recommendation(s):    No further therapy is recommended. Pt functioning below baseline as anticipated after surgery. However, she is moving well w/ FWW and able to complete basic ADL tasks w/ Mod I - Min A. Pt has good support at home from spouse and necessary equipment to facilitate safe d/c home. Recommend she have assist w/ bathing, heavy IADL tasks and driving. Pt in agreement w/ plan.

## 2023-01-23 ENCOUNTER — CARE COORDINATION (OUTPATIENT)
Dept: FAMILY MEDICINE | Facility: CLINIC | Age: 73
End: 2023-01-23

## 2023-01-23 LAB
BACTERIA SNV CULT: NO GROWTH
BACTERIA TISS BX CULT: NO GROWTH

## 2023-01-23 NOTE — PROGRESS NOTES
Care Coordination Initial Assessment    The patient was admitted into [facility] on 1/18/23 for a S/P revision of total right knee. She was discharged on 1/19/23 with instructions to follow up with orthopedic surgeon and with PCP.    PCP: Noemi Kirkpatrick    Referral Source:  ED/IP List    Utilization:   Last PCP Appt.: 1/6/23    Health Maintenance Reviewed: Yes    Current Medical Health Concerns:   Please review patients current medical problem list.    Patient/Caregiver Understanding: Yes    Medication Management:   Patient has understanding of regimen and is adherent:  Yes    Functional Status:   Independent with all ADL/IADL's    Current Behavioral Health Concerns:   No concerns at this time     Patient/Caregiver Understanding:  yes    Psychosocial:  No concerns-pt states that she has a good life and does not need anything at this time     Gaps:    Health Maintenance: Will review more at patients hospital follow up visit     Resources Given:    No resources were needed by the patient at this time     Plan:   I called the patient to see how she is doing and she stated that she is doing better then she expected but still sore. She stated that it is harder to get around right now but know this will get better with time and healing. She was able to get a hospital follow up visit scheduled with her primary care provider Dr. Kirkpatrick for Monday 1/30/23 at 12:30 pm. She had no further questions or concerns at this time .

## 2023-01-25 LAB
BACTERIA TISS BX CULT: NORMAL

## 2023-01-30 ENCOUNTER — OFFICE VISIT (OUTPATIENT)
Dept: FAMILY MEDICINE | Facility: CLINIC | Age: 73
End: 2023-01-30

## 2023-01-30 VITALS
OXYGEN SATURATION: 98 % | HEART RATE: 63 BPM | TEMPERATURE: 98.6 F | BODY MASS INDEX: 34.49 KG/M2 | DIASTOLIC BLOOD PRESSURE: 74 MMHG | HEIGHT: 65 IN | SYSTOLIC BLOOD PRESSURE: 122 MMHG | WEIGHT: 207 LBS

## 2023-01-30 DIAGNOSIS — D64.89 ANEMIA DUE TO OTHER CAUSE, NOT CLASSIFIED: ICD-10-CM

## 2023-01-30 DIAGNOSIS — R73.09 HIGH GLUCOSE: Primary | ICD-10-CM

## 2023-01-30 LAB
% GRANULOCYTES: 73.4 %
BUN SERPL-MCNC: 15 MG/DL (ref 7–25)
BUN/CREATININE RATIO: 20 (ref 6–22)
CALCIUM SERPL-MCNC: 9.2 MG/DL (ref 8.6–10.3)
CHLORIDE SERPLBLD-SCNC: 90 MMOL/L (ref 98–110)
CO2 SERPL-SCNC: 31.6 MMOL/L (ref 20–32)
CREAT SERPL-MCNC: 0.75 MG/DL (ref 0.6–1.3)
GLUCOSE SERPL-MCNC: 100 MG/DL (ref 60–99)
HCT VFR BLD AUTO: 37.7 % (ref 35–47)
HEMOGLOBIN: 12.4 G/DL (ref 11.7–15.7)
LYMPHOCYTES NFR BLD AUTO: 18.2 %
MCH RBC QN AUTO: 27.4 PG (ref 26–33)
MCHC RBC AUTO-ENTMCNC: 32.9 G/DL (ref 31–36)
MCV RBC AUTO: 83.2 FL (ref 78–100)
MONOCYTES NFR BLD AUTO: 8.4 %
PLATELET COUNT - QUEST: 287 10^9/L (ref 150–375)
POTASSIUM SERPL-SCNC: 4.28 MMOL/L (ref 3.5–5.3)
RBC # BLD AUTO: 4.53 10*12/L (ref 3.8–5.2)
SODIUM SERPL-SCNC: 129.9 MMOL/L (ref 135–146)
WBC # BLD AUTO: 6.8 10*9/L (ref 4–11)

## 2023-01-30 PROCEDURE — 80048 BASIC METABOLIC PNL TOTAL CA: CPT | Performed by: FAMILY MEDICINE

## 2023-01-30 PROCEDURE — 36415 COLL VENOUS BLD VENIPUNCTURE: CPT | Performed by: FAMILY MEDICINE

## 2023-01-30 PROCEDURE — 99495 TRANSJ CARE MGMT MOD F2F 14D: CPT | Performed by: FAMILY MEDICINE

## 2023-01-30 PROCEDURE — 85025 COMPLETE CBC W/AUTO DIFF WBC: CPT | Performed by: FAMILY MEDICINE

## 2023-01-30 NOTE — PROGRESS NOTES
"  Assessment & Plan   Problem List Items Addressed This Visit    None  Visit Diagnoses     High glucose    -  Primary    Relevant Orders    VENOUS COLLECTION (Completed)    HEMOGRAM PLATELET DIFF (BFP) (Completed)    Anemia due to other cause, not classified        Relevant Orders    Basic Metabolic Panel (BFP)         1. High glucose  Check bmp.  - VENOUS COLLECTION  - HEMOGRAM PLATELET DIFF (BFP)    2. Anemia due to other cause, not classified  Check cbc.  - Basic Metabolic Panel (BFP)      BMI:   Estimated body mass index is 34.45 kg/m  as calculated from the following:    Height as of this encounter: 1.651 m (5' 5\").    Weight as of this encounter: 93.9 kg (207 lb).       FUTURE APPOINTMENTS:       - Follow-up visit with ortho.    No follow-ups on file.    Noemi Kirkpatrick MD  [facility]    Subjective   Evan is a 72 year old, presenting for the following health issues:  Hospital F/U (Was in [facility] from 01/18/23-01/19/23 for right total knee revision, using ice and tylenol for pain, has wound vac until tomorrow will be seeing orthopedics)      hospitals       Hospital Follow-up Visit:    Hospital/Nursing Home/IP Rehab Facility: [facility]  Date of Admission: 01/18/23  Date of Discharge: 01/19/23  Reason(s) for Admission: Revision of right total knee arthroplasty    Was your hospitalization related to COVID-19? No   Problems taking medications regularly:  None  Medication changes since discharge: None  Problems adhering to non-medication therapy:  None    Here with  to followup on hosp visit, right knee surgery. Has a drain in, is feeling well, doing well at home. Doing her home exercises, doesn't need pt/ot yet, will see ortho tomorrow.  hgb dropped in hosp and needs this rechecked. Also wants to check on her blood sugar.    Summary of hospitalization:  [facility] discharge summary reviewed  Diagnostic Tests/Treatments reviewed.  Follow up needed: " "ortho tomorrow. They haven't told her yet about pt/ot. Is doing some home exercises.  Other Healthcare Providers Involved in Patient s Care:         None  Update since discharge: improved.   Plan of care communicated with patient and family         Review of Systems   Constitutional, HEENT, cardiovascular, pulmonary, gi and gu systems are negative, except as otherwise noted.      Objective    /74 (BP Location: Left arm, Patient Position: Sitting, Cuff Size: Adult Large)   Pulse 63   Temp 98.6  F (37  C) (Temporal)   Ht 1.651 m (5' 5\")   Wt 93.9 kg (207 lb)   SpO2 98%   BMI 34.45 kg/m    Body mass index is 34.45 kg/m .  Physical Exam   GENERAL: healthy, alert and no distress  MS: no gross musculoskeletal defects noted, no edema  NEURO: Normal strength and tone, mentation intact and speech normal  PSYCH: mentation appears normal, affect normal/bright    Results for orders placed or performed in visit on 01/30/23   HEMOGRAM PLATELET DIFF (BFP)     Status: None   Result Value Ref Range    WBC 6.8 4.0 - 11 10*9/L    RBC Count 4.53 3.8 - 5.2 10*12/L    Hemoglobin 12.4 11.7 - 15.7 g/dL    Hematocrit 37.7 35.0 - 47.0 %    MCV 83.2 78 - 100 fL    MCH 27.4 26 - 33 pg    MCHC 32.9 31 - 36 g/dL    Platelet Count 287 150 - 375 10^9/L    % Granulocytes 73.4 %    % Lymphocytes 18.2 %    % Monocytes 8.4 %                     "

## 2023-01-30 NOTE — NURSING NOTE
Chief Complaint   Patient presents with     Hospital F/U     Was in [facility] from 01/18/23-01/19/23 for right total knee revision, using ice and tylenol for pain, has wound vac until tomorrow will be seeing orthopedics

## 2023-02-01 LAB — BACTERIA SNV CULT: NORMAL

## 2023-02-02 NOTE — DISCHARGE SUMMARY
Discharge Summary    Ely Pollack MRN# [id number]   YOB: 1950 Age: 72 year old     Date of Admission:  1/18/2023  Date of Discharge:  2/3/23  Admitting Physician:  Ben Lindsay MD  Discharge Physician:  Ben Lindsay MD     Primary Provider: Noemi Kirkpatrick          Admission Diagnoses:   1.  Right total knee arthroplasty instability.  2.  Concern for aseptic loosening, right total knee arthroplasty.          Discharge Diagnosis:   1.  Right total knee arthroplasty instability.  2.  Aseptic loosening, right femoral component, total knee arthroplasty.           Surgical Procedure:    Revision right total knee arthroplasty, femoral and tibial components with sleeves and stems.           Discharge Disposition:     Discharged to home           Medications Prior to Admission:     No medications prior to admission.             Discharge Medications:     Discharge Medication List as of 1/19/2023 10:18 AM      START taking these medications    Details   acetaminophen (TYLENOL) 325 MG tablet Take 2 tablets (650 mg) by mouth every 4 hours as needed for other (mild pain), Disp-100 tablet, R-0, E-Prescribe      doxycycline hyclate (VIBRAMYCIN) 100 MG capsule Take 1 capsule (100 mg) by mouth 2 times daily, Disp-28 capsule, R-0, E-Prescribe      celecoxib (CELEBREX) 200 MG capsule Take 1 capsule (200 mg) by mouth daily for 5 days Do not take within 6 hours of ibuprofen (MOTRIN, ADVIL) or ketorolac (TORADOL) if prescribed., Disp-5 capsule, R-0, E-Prescribe      hydrOXYzine (ATARAX) 10 MG tablet Take 1 tablet (10 mg) by mouth every 6 hours as needed for itching or anxiety (with pain, moderate pain), Disp-30 tablet, R-0, E-Prescribe      oxyCODONE (ROXICODONE) 5 MG tablet Take 0.5 tablets (2.5 mg) by mouth every 4 hours as needed for moderate to severe pain, Disp-30 tablet, R-0, E-Prescribe      senna-docusate (SENOKOT-S/PERICOLACE) 8.6-50 MG tablet Take 1-2 tablets by mouth 2 times daily Take  while on oral narcotics to prevent or treat constipation., Disp-30 tablet, R-0, E-PrescribeWhile taking narcotics         CONTINUE these medications which have CHANGED    Details   !! aspirin (ASA) 325 MG EC tablet Take 1 tablet (325 mg) by mouth daily, Disp-42 tablet, R-0, E-Prescribe      !! aspirin (ASA) 81 MG EC tablet Take 2 tablets (162 mg) by mouth daily (81MG X 2 = 162MG). Resume usual dose of aspirin after finishing increased dose prescribed after surgery, Historical       !! - Potential duplicate medications found. Please discuss with provider.      CONTINUE these medications which have NOT CHANGED    Details   albuterol (PROAIR HFA/PROVENTIL HFA/VENTOLIN HFA) 108 (90 Base) MCG/ACT inhaler Inhale 2 puffs into the lungs every 6 hours as needed for shortness of breath / dyspnea or wheezing, Disp-18 g, R-3, E-PrescribePharmacy may dispense brand covered by insurance (Proair, or proventil or ventolin or generic albuterol inhaler)      albuterol (PROVENTIL) (2.5 MG/3ML) 0.083% neb solution Take 1 vial (2.5 mg) by nebulization every 6 hours as needed for shortness of breath / dyspnea or wheezing, Disp-90 mL, R-3, E-Prescribe      atenolol (TENORMIN) 50 MG tablet Take 1 tablet (50 mg) by mouth daily, Disp-90 tablet, R-1, E-Prescribe      busPIRone (BUSPAR) 15 MG tablet Take 0.5 tablets (7.5 mg) by mouth 2 times daily, Disp-90 tablet, R-1, E-Prescribe      citalopram (CELEXA) 40 MG tablet Take 1 tablet (40 mg) by mouth daily, Disp-90 tablet, R-1, E-Prescribe      EPINEPHrine (ANY BX GENERIC EQUIV) 0.3 MG/0.3ML injection 2-pack Inject 0.3 mg into the muscle as needed for anaphylaxis , Historical      fluticasone (FLONASE) 50 MCG/ACT nasal spray Spray 1 spray into both nostrils daily as needed for allergies, Historical      hydrochlorothiazide (HYDRODIURIL) 25 MG tablet Take 1 tablet (25 mg) by mouth daily, Disp-90 tablet, R-1, E-Prescribe      KLOR-CON 20 MEQ CR tablet Take 1 tablet (20 mEq) by mouth daily, Disp-90  tablet, R-1, ADRIANO, E-Prescribe      levocetirizine (XYZAL) 5 MG tablet Take 5 mg by mouth daily, Historical      MAGNESIUM GLYCINATE PO Take 400 mg by mouth 2 times daily as needed (LEG CRAMPS), Historical      melatonin 5 MG tablet Take 10 mg by mouth nightly as needed for sleep (5MG X 2 = 10MG), Historical      omeprazole (PRILOSEC) 20 MG DR capsule Take 20 mg by mouth daily, Historical      triamcinolone (KENALOG) 0.1 % external cream Apply topically 2 times daily as needed for irritationHistorical         STOP taking these medications       naproxen sodium (ANAPROX) 220 MG tablet Comments:   Reason for Stopping:                     Consultations:     PT/OT           Hospital Course:     The patient was admitted after the surgical procedure.The patient underwent an uneventful revision total knee arthroplasty. Postoperatively, Aspirin 325 mg was prescribed for DVT prophylaxis. Home medications have been reconciled. oxycodone 5 mg. was prescribed for pain.             Discharge Instructions and Follow-Up:          Discharge activity: WBAT with a walker   Discharge follow-up: Follow-up with Dr. Lindsay in ~14 days post-op. [phone]   Outpatient therapy: Should already be arranged by office.  If not, patient should call to schedule 2x/week x 3 weeks.   Home Care agency: None   Supplies and equipment: None        Wound care: Leave Prevena dressing clean, dry, and intact until your follow up appointment.     Other instructions: Doxycycline 100 mg BID x 2 weeks while we follow cultures.      Danilo Pompa PA-C

## 2023-02-08 ENCOUNTER — OFFICE VISIT (OUTPATIENT)
Dept: FAMILY MEDICINE | Facility: CLINIC | Age: 73
End: 2023-02-08

## 2023-02-08 VITALS
HEIGHT: 65 IN | WEIGHT: 207 LBS | BODY MASS INDEX: 34.49 KG/M2 | HEART RATE: 72 BPM | DIASTOLIC BLOOD PRESSURE: 72 MMHG | TEMPERATURE: 97.7 F | SYSTOLIC BLOOD PRESSURE: 124 MMHG | OXYGEN SATURATION: 97 %

## 2023-02-08 DIAGNOSIS — E87.1 HYPONATREMIA: Primary | ICD-10-CM

## 2023-02-08 LAB
BUN SERPL-MCNC: 13 MG/DL (ref 7–25)
BUN/CREATININE RATIO: 17.8 (ref 6–22)
CALCIUM SERPL-MCNC: 9 MG/DL (ref 8.6–10.3)
CHLORIDE SERPLBLD-SCNC: 93.3 MMOL/L (ref 98–110)
CO2 SERPL-SCNC: 31.8 MMOL/L (ref 20–32)
CREAT SERPL-MCNC: 0.73 MG/DL (ref 0.6–1.3)
GLUCOSE SERPL-MCNC: 132 MG/DL (ref 60–99)
POTASSIUM SERPL-SCNC: 3.45 MMOL/L (ref 3.5–5.3)
SODIUM SERPL-SCNC: 132.5 MMOL/L (ref 135–146)

## 2023-02-08 PROCEDURE — 99213 OFFICE O/P EST LOW 20 MIN: CPT | Performed by: FAMILY MEDICINE

## 2023-02-08 PROCEDURE — 36415 COLL VENOUS BLD VENIPUNCTURE: CPT | Performed by: FAMILY MEDICINE

## 2023-02-08 PROCEDURE — 80048 BASIC METABOLIC PNL TOTAL CA: CPT | Performed by: FAMILY MEDICINE

## 2023-02-08 NOTE — PROGRESS NOTES
"Assessment & Plan   Problem List Items Addressed This Visit    None  Visit Diagnoses     Hyponatremia    -  Primary    Relevant Orders    VENOUS COLLECTION (Completed)    Basic Metabolic Panel (BFP)    VENOUS COLLECTION    Basic Metabolic Panel (BFP)         1. Hyponatremia  Check today. If still running low consider stopping her hydrochlorothiazide, she said she takes this for dizziness. She will recheck this again before going to [place] in march.  - VENOUS COLLECTION  - Basic Metabolic Panel (BFP)  - VENOUS COLLECTION; Future  - Basic Metabolic Panel (BFP); Future             FUTURE APPOINTMENTS:       - Follow-up visit per results.    No follow-ups on file.    Noemi Kirkpatrick MD  [facility]    Subjective     Nursing Notes:   China Vasques, [facility]  2/8/2023  3:17 PM  Signed  Chief Complaint   Patient presents with     Abnormal Labs     Recheck sodium      Pre-visit Screening:  Immunizations:  up to date  Colonoscopy:  is up to date  Mammogram: is up to date  Asthma Action Test/Plan:  NA  PHQ9:  NA  GAD7:  NA  Questioned patient about current smoking habits Pt. has never smoked.  Ok to leave detailed message on voice mail for today's visit only Yes, phone # [phone]           Ely Pollack is a 72 year old female who presents to clinic today for the following health issues     HPI     Had a low sodium, added a little more salt. Not taking ibuprofen.  Misread her K, started taking a whole K now. Also taking hydrochlorothiazide for vertigo for years.        Review of Systems   Constitutional, HEENT, cardiovascular, pulmonary, gi and gu systems are negative, except as otherwise noted.      Objective    /72 (BP Location: Left arm, Patient Position: Sitting, Cuff Size: Adult Large)   Pulse 72   Temp 97.7  F (36.5  C) (Temporal)   Ht 1.651 m (5' 5\")   Wt 93.9 kg (207 lb)   SpO2 97%   BMI 34.45 kg/m    Body mass index is 34.45 kg/m .  Physical Exam   GENERAL: healthy, alert and no " distress  MS: no gross musculoskeletal defects noted, no edema  NEURO: Normal strength and tone, mentation intact and speech normal  PSYCH: mentation appears normal, affect normal/bright    No results found for any visits on 02/08/23.

## 2023-02-08 NOTE — NURSING NOTE
Chief Complaint   Patient presents with     Abnormal Labs     Recheck sodium      Pre-visit Screening:  Immunizations:  up to date  Colonoscopy:  is up to date  Mammogram: is up to date  Asthma Action Test/Plan:  NA  PHQ9:  NA  GAD7:  NA  Questioned patient about current smoking habits Pt. has never smoked.  Ok to leave detailed message on voice mail for today's visit only Yes, phone # [phone]

## 2023-02-21 DIAGNOSIS — E87.1 HYPONATREMIA: ICD-10-CM

## 2023-02-21 LAB
BUN SERPL-MCNC: 16 MG/DL (ref 7–25)
BUN/CREATININE RATIO: 22.9 (ref 6–22)
CALCIUM SERPL-MCNC: 8.9 MG/DL (ref 8.6–10.3)
CHLORIDE SERPLBLD-SCNC: 100.4 MMOL/L (ref 98–110)
CO2 SERPL-SCNC: 31.4 MMOL/L (ref 20–32)
CREAT SERPL-MCNC: 0.7 MG/DL (ref 0.6–1.3)
GLUCOSE SERPL-MCNC: 118 MG/DL (ref 60–99)
POTASSIUM SERPL-SCNC: 4.54 MMOL/L (ref 3.5–5.3)
SODIUM SERPL-SCNC: 138.7 MMOL/L (ref 135–146)

## 2023-02-21 PROCEDURE — 80048 BASIC METABOLIC PNL TOTAL CA: CPT | Performed by: FAMILY MEDICINE

## 2023-02-21 PROCEDURE — 36415 COLL VENOUS BLD VENIPUNCTURE: CPT | Performed by: FAMILY MEDICINE

## 2023-03-07 ENCOUNTER — OFFICE VISIT (OUTPATIENT)
Dept: FAMILY MEDICINE | Facility: CLINIC | Age: 73
End: 2023-03-07

## 2023-03-07 VITALS
SYSTOLIC BLOOD PRESSURE: 122 MMHG | HEIGHT: 65 IN | WEIGHT: 206 LBS | OXYGEN SATURATION: 99 % | DIASTOLIC BLOOD PRESSURE: 74 MMHG | BODY MASS INDEX: 34.32 KG/M2 | HEART RATE: 63 BPM | TEMPERATURE: 97.7 F

## 2023-03-07 DIAGNOSIS — Z00.00 INITIAL MEDICARE ANNUAL WELLNESS VISIT: Primary | ICD-10-CM

## 2023-03-07 DIAGNOSIS — E87.1 HYPONATREMIA: ICD-10-CM

## 2023-03-07 DIAGNOSIS — I10 ESSENTIAL HYPERTENSION: ICD-10-CM

## 2023-03-07 LAB
BUN SERPL-MCNC: 14 MG/DL (ref 7–25)
BUN/CREATININE RATIO: 20 (ref 6–22)
CALCIUM SERPL-MCNC: 8.5 MG/DL (ref 8.6–10.3)
CHLORIDE SERPLBLD-SCNC: 99.2 MMOL/L (ref 98–110)
CO2 SERPL-SCNC: 29 MMOL/L (ref 20–32)
CREAT SERPL-MCNC: 0.7 MG/DL (ref 0.6–1.3)
GLUCOSE SERPL-MCNC: 119 MG/DL (ref 60–99)
POTASSIUM SERPL-SCNC: 4.39 MMOL/L (ref 3.5–5.3)
SODIUM SERPL-SCNC: 137.6 MMOL/L (ref 135–146)

## 2023-03-07 PROCEDURE — 36415 COLL VENOUS BLD VENIPUNCTURE: CPT | Performed by: FAMILY MEDICINE

## 2023-03-07 PROCEDURE — 99213 OFFICE O/P EST LOW 20 MIN: CPT | Mod: 25 | Performed by: FAMILY MEDICINE

## 2023-03-07 PROCEDURE — 80048 BASIC METABOLIC PNL TOTAL CA: CPT | Performed by: FAMILY MEDICINE

## 2023-03-07 PROCEDURE — G0438 PPPS, INITIAL VISIT: HCPCS | Performed by: FAMILY MEDICINE

## 2023-03-07 RX ORDER — HYDROCHLOROTHIAZIDE 25 MG/1
12.5 TABLET ORAL DAILY
Qty: 1 TABLET | Refills: 0 | COMMUNITY
Start: 2023-03-07 | End: 2023-06-05

## 2023-03-07 NOTE — PROGRESS NOTES
Ely Pollack is a 72 year old female who presents for Medicare Annual Wellness Visit.    Current providers caring for this patient include:  Patient Care Team:  Noemi Kirkpatrick MD as PCP - General (Family Medicine)  Noemi Kirkpatrick MD as Assigned PCP    Complete Medical and Social history reviewed with patient, outlined below.    Patient Active Problem List   Diagnosis     History of bilateral knee arthroplasty     Essential hypertension     Anxiety     Chronic back pain, unspecified back location, unspecified back pain laterality     Bee sting allergy     Leg cramps     Chronic neck pain     Mild intermittent asthma without complication     Cervical radiculopathy at C7     Choroidal nevus, right eye     Degenerative arthritis of lumbar spine     Degenerative drusen, bilateral     Dermatochalasis of both upper eyelids     History of squamous cell carcinoma of skin     Major depressive disorder, recurrent episode, moderate (H)     Nuclear sclerotic cataract, bilateral     Presbyopia     Regular astigmatism, bilateral     Squamous cell carcinoma of lower leg, left     S/P revision of total knee, right       Past Medical History:   Diagnosis Date     Arthritis      Hypertension     Cardiology No     Other chronic pain     Pain for 10 years     PONV (postoperative nausea and vomiting)      Uncomplicated asthma        Past Surgical History:   Procedure Laterality Date     ARTHROPLASTY KNEE BILATERAL Bilateral 3/29/2016    Procedure: ARTHROPLASTY KNEE BILATERAL;  Surgeon: Darryn Cortez MD;  Location: [facility] OR     ARTHROPLASTY REVISION KNEE Right 1/18/2023    Procedure: revision right total knee arthroplasty, total component revision for aseptic loosening;  Surgeon: Ben Lindsay MD;  Location: [facility]     AS DECOMPRESS SPINAL CORD,1 SEG      Thorasic     AS SPINAL FUSION,ANT,EA ADNL LEVEL      Thorasic     BUNIONECTOMY Left        Family History   Problem Relation Age of Onset     Bone Cancer Mother   "    Chronic Obstructive Pulmonary Disease Father      Heart Disease Brother      Kidney Disease Brother      Diabetes Type 2  Brother      Alcoholism Brother      Heart Disease Brother      Kidney Disease Brother      Alcoholism Brother      Diabetes Type 2  Brother      Colon Cancer Maternal Grandmother        Social History     Tobacco Use     Smoking status: Never     Smokeless tobacco: Never   Substance Use Topics     Alcohol use: Yes     Comment: 3 drinks weekly       Diet: regular  Physical Activity: generally inactive, just had right knee surgery  Depression Screen:    Over the past 2 weeks, patient has felt down, depressed, or hopeless:  No    Over the past 2 weeks, patient has felt little interest or pleasure in doing things: No    Functional ability/Safety screen:  Up and go test (able to get up and walk longer than 30 seconds): Passed  Patient needs assistance with: nothing  Patient's home has the following possible safety concerns: none identified  Patient has concerns about her hearing:  No  Cognitive Screen  Patient repeats three objects (ball, flag, tree)      Clock drawing test:   NORMAL  Recalls three objects after 3 minutes (ball,flag,tree):                              recalls 1 objects (1 points)    Physical Exam:  /74 (BP Location: Right arm, Patient Position: Sitting, Cuff Size: Adult Large)   Pulse 63   Temp 97.7  F (36.5  C) (Temporal)   Ht 1.651 m (5' 5\")   Wt 93.4 kg (206 lb)   SpO2 99%   BMI 34.28 kg/m     Body mass index is 34.28 kg/m .     Health Maintenance   Topic Date Due     COVID-19 Vaccine (4 - Booster for Moderna series) 01/06/2022     MEDICARE ANNUAL WELLNESS VISIT  04/05/2023     ASTHMA ACTION PLAN  04/05/2023     DTAP/TDAP/TD IMMUNIZATION (1 - Tdap) 03/11/2025 (Originally 3/27/2013)     ASTHMA CONTROL TEST  04/13/2023     PHQ-9  04/13/2023     COLORECTAL CANCER SCREENING  02/19/2024     FALL RISK ASSESSMENT  03/07/2024     MAMMO SCREENING  03/31/2024     ADVANCE " CARE PLANNING  02/12/2026     DEXA  02/19/2026     LIPID  04/05/2027     HEPATITIS C SCREENING  Completed     DEPRESSION ACTION PLAN  Completed     INFLUENZA VACCINE  Completed     Pneumococcal Vaccine: 65+ Years  Completed     ZOSTER IMMUNIZATION  Completed     IPV IMMUNIZATION  Aged Out     MENINGITIS IMMUNIZATION  Aged Out         End of Life Planning:   Patient currently has an advanced directive: No.  I have verified the patient's ablity to prepare an advanced directive/make health care decisions.  Literature was provided to assist patient in preparing an advanced directive.    Education/Counseling:   Based on review of the above information, the following items were addressed:      Obesity - appropriate counseling on diet, exercise, etc.      Elevated blood pressure - follow-up plans made    Appropriate preventive services were discussed with this patient, including applicable screening as appropriate for cardiovascular disease, diabetes, osteopenia/osteoporosis, and glaucoma.  As appropriate for age/gender, discussed screening for colorectal cancer, prostate cancer, breast cancer, and cervical cancer.   Checklist reviewing preventive services available has been given to the patient.

## 2023-03-07 NOTE — NURSING NOTE
Chief Complaint   Patient presents with     Recheck Medication     Fasting today     Pre-visit Screening:  Immunizations:  up to date  Colonoscopy:  is up to date  Mammogram: is up to date  Asthma Action Test/Plan:  FIDELIA  PHQ9:  NA  GAD7:  NA  Questioned patient about current smoking habits Pt. has never smoked.  Ok to leave detailed message on voice mail for today's visit only Yes, phone # [phone]

## 2023-03-07 NOTE — PROGRESS NOTES
Assessment & Plan   Problem List Items Addressed This Visit        Circulatory    Essential hypertension    Relevant Medications    hydrochlorothiazide (HYDRODIURIL) 25 MG tablet   Other Visit Diagnoses     Initial Medicare annual wellness visit    -  Primary    Hyponatremia        Relevant Orders    VENOUS COLLECTION (Completed)    Basic Metabolic Panel (BFP)         1. Initial Medicare annual wellness visit  Completed.    2. Essential hypertension  Controlled, check bmp. Continue 1/2 pill per day.   - hydrochlorothiazide (HYDRODIURIL) 25 MG tablet; Take 0.5 tablets (12.5 mg) by mouth daily  Dispense: 1 tablet; Refill: 0    3. Hyponatremia  Check labs.  - VENOUS COLLECTION  - Basic Metabolic Panel (BFP)             FUTURE APPOINTMENTS:       - Follow-up visit when due for med refills.    No follow-ups on file.    Noemi Kirkpatrick MD  [facility]    Subjective     Nursing Notes:   China Vasques CMA  3/7/2023  9:59 AM  Signed  Chief Complaint   Patient presents with     Recheck Medication     Fasting today     Pre-visit Screening:  Immunizations:  up to date  Colonoscopy:  is up to date  Mammogram: is up to date  Asthma Action Test/Plan:  NA  PHQ9:  NA  GAD7:  NA  Questioned patient about current smoking habits Pt. has never smoked.  Ok to leave detailed message on voice mail for today's visit only Yes, phone # [phone]           Ely Pollack is a 72 year old female who presents to clinic today for the following health issues     HPI     Here for a check on her sodium. It was low. We then had decreased her dose of hydrochlorothiazide to 1/2 pill per day, sodium was then normal. Blood pressures at home are ok. She doesn't need any of her medications to refill.        Review of Systems   Constitutional, HEENT, cardiovascular, pulmonary, gi and gu systems are negative, except as otherwise noted.      Objective    /74 (BP Location: Right arm, Patient Position: Sitting, Cuff Size: Adult  "Large)   Pulse 63   Temp 97.7  F (36.5  C) (Temporal)   Ht 1.651 m (5' 5\")   Wt 93.4 kg (206 lb)   SpO2 99%   BMI 34.28 kg/m    Body mass index is 34.28 kg/m .  Physical Exam   GENERAL: healthy, alert and no distress  MS: no gross musculoskeletal defects noted, no edema  NEURO: Normal strength and tone, mentation intact and speech normal  PSYCH: mentation appears normal, affect normal/bright    No results found for any visits on 03/07/23.      "

## 2023-03-07 NOTE — PATIENT INSTRUCTIONS
Health Maintenance   Topic Date Due    COVID-19 Vaccine (4 - Booster for Moderna series) 01/06/2022    MEDICARE ANNUAL WELLNESS VISIT  04/05/2023    ASTHMA ACTION PLAN  04/05/2023    DTAP/TDAP/TD IMMUNIZATION (1 - Tdap) 03/11/2025 (Originally 3/27/2013)    ASTHMA CONTROL TEST  04/13/2023    PHQ-9  04/13/2023    COLORECTAL CANCER SCREENING  02/19/2024    FALL RISK ASSESSMENT  03/07/2024    MAMMO SCREENING  03/31/2024    ADVANCE CARE PLANNING  02/12/2026    DEXA  02/19/2026    LIPID  04/05/2027    HEPATITIS C SCREENING  Completed    DEPRESSION ACTION PLAN  Completed    INFLUENZA VACCINE  Completed    Pneumococcal Vaccine: 65+ Years  Completed    ZOSTER IMMUNIZATION  Completed    IPV IMMUNIZATION  Aged Out    MENINGITIS IMMUNIZATION  Aged Out

## 2023-04-12 DIAGNOSIS — F32.89 OTHER DEPRESSION: ICD-10-CM

## 2023-04-12 DIAGNOSIS — F41.9 ANXIETY: ICD-10-CM

## 2023-04-12 DIAGNOSIS — I10 ESSENTIAL HYPERTENSION: ICD-10-CM

## 2023-04-12 RX ORDER — BUSPIRONE HYDROCHLORIDE 15 MG/1
7.5 TABLET ORAL 2 TIMES DAILY
Qty: 90 TABLET | Refills: 1 | COMMUNITY
Start: 2023-04-12

## 2023-04-12 RX ORDER — ATENOLOL 50 MG/1
TABLET ORAL
Qty: 90 TABLET | Refills: 1 | COMMUNITY
Start: 2023-04-12

## 2023-04-12 RX ORDER — CITALOPRAM HYDROBROMIDE 40 MG/1
TABLET ORAL
Qty: 90 TABLET | Refills: 1 | COMMUNITY
Start: 2023-04-12

## 2023-04-12 RX ORDER — HYDROCHLOROTHIAZIDE 25 MG/1
TABLET ORAL
Qty: 90 TABLET | Refills: 1 | COMMUNITY
Start: 2023-04-12

## 2023-04-12 NOTE — TELEPHONE ENCOUNTER
Ely Pollack is requesting a refill of:    Refused Prescriptions:                       Disp   Refills    citalopram (CELEXA) 40 MG tablet [Pharmacy*90 tab*1        Sig: TAKE 1 TABLET BY MOUTH EVERY DAY  Refused By: HECTOR QUIÑONEZ  Reason for Refusal: Patient needs appointment    atenolol (TENORMIN) 50 MG tablet [Pharmacy*90 tab*1        Sig: TAKE 1 TABLET BY MOUTH EVERY DAY  Refused By: HECTOR QUIÑONEZ  Reason for Refusal: Patient needs appointment    hydrochlorothiazide (HYDRODIURIL) 25 MG ta*90 tab*1        Sig: TAKE 1 TABLET BY MOUTH EVERY DAY  Refused By: HECTOR QUIÑONEZ  Reason for Refusal: Patient needs appointment    busPIRone (BUSPAR) 15 MG tablet [Pharmacy *90 tab*1        Sig: TAKE 0.5 TABLETS (7.5 MG) BY MOUTH 2 TIMES DAILY  Refused By: HECTOR QUIÑONEZ  Reason for Refusal: Patient needs appointment    Pt due for FASTING OV

## 2023-05-30 DIAGNOSIS — I10 ESSENTIAL HYPERTENSION: ICD-10-CM

## 2023-05-30 DIAGNOSIS — F41.9 ANXIETY: ICD-10-CM

## 2023-05-30 DIAGNOSIS — F32.89 OTHER DEPRESSION: ICD-10-CM

## 2023-05-30 NOTE — TELEPHONE ENCOUNTER
Ely Pollack is requesting a refill of:    Pending Prescriptions:                       Disp   Refills    atenolol (TENORMIN) 50 MG tablet          90 tab*1            Sig: Take 1 tablet (50 mg) by mouth daily    citalopram (CELEXA) 40 MG tablet          90 tab*1            Sig: Take 1 tablet (40 mg) by mouth daily    hydrochlorothiazide (HYDRODIURIL) 25 MG t*45 tab*1            Sig: Take 0.5 tablets (12.5 mg) by mouth daily    Last seen 3/7/23

## 2023-05-30 NOTE — TELEPHONE ENCOUNTER
See my note on 03.07, she was in for labwork, was advised to flup for a med check when due for refills. Needs an apt

## 2023-06-01 DIAGNOSIS — F32.89 OTHER DEPRESSION: ICD-10-CM

## 2023-06-01 DIAGNOSIS — F41.9 ANXIETY: ICD-10-CM

## 2023-06-01 DIAGNOSIS — I10 ESSENTIAL HYPERTENSION: ICD-10-CM

## 2023-06-01 RX ORDER — CITALOPRAM HYDROBROMIDE 40 MG/1
40 TABLET ORAL DAILY
Qty: 7 TABLET | Refills: 0 | Status: SHIPPED | OUTPATIENT
Start: 2023-06-01 | End: 2023-06-05

## 2023-06-01 RX ORDER — ATENOLOL 50 MG/1
TABLET ORAL
Qty: 90 TABLET | Refills: 1 | COMMUNITY
Start: 2023-06-01

## 2023-06-01 RX ORDER — HYDROCHLOROTHIAZIDE 25 MG/1
TABLET ORAL
Qty: 90 TABLET | Refills: 1 | COMMUNITY
Start: 2023-06-01

## 2023-06-01 NOTE — TELEPHONE ENCOUNTER
Ely Pollack is requesting a refill of:    Refused Prescriptions:                       Disp   Refills    atenolol (TENORMIN) 50 MG tablet [Pharmacy*90 tab*1        Sig: TAKE 1 TABLET BY MOUTH EVERY DAY  Refused By: HECTOR QUIÑONEZ  Reason for Refusal: Patient needs appointment    hydrochlorothiazide (HYDRODIURIL) 25 MG ta*90 tab*1        Sig: TAKE 1 TABLET BY MOUTH EVERY DAY  Refused By: HECTOR QUIÑONEZ  Reason for Refusal: Patient needs appointment    Pt due for FASTING OV

## 2023-06-01 NOTE — TELEPHONE ENCOUNTER
Patient scheduled her appointment for this upcoming Monday with LEIA-routing to Melissa to send in due to KEP being out of office.

## 2023-06-02 RX ORDER — CITALOPRAM HYDROBROMIDE 40 MG/1
TABLET ORAL
Qty: 90 TABLET | Refills: 1 | COMMUNITY
Start: 2023-06-02

## 2023-06-02 NOTE — TELEPHONE ENCOUNTER
Ely Pollack is requesting a refill of:    Refused Prescriptions:                       Disp   Refills    citalopram (CELEXA) 40 MG tablet [Pharmacy*90 tab*1        Sig: TAKE 1 TABLET BY MOUTH EVERY DAY  Refused By: HECTOR QUIÑONEZ  Reason for Refusal: Patient needs appointment  Reason for Refusal Comment: 7 tablet sent yesterday

## 2023-06-02 NOTE — PROGRESS NOTES
Assessment & Plan   Problem List Items Addressed This Visit        Respiratory    Mild intermittent asthma without complication - Primary    Relevant Medications    fluticasone-vilanterol (BREO ELLIPTA) 100-25 MCG/ACT inhaler    Other Relevant Orders    Asthma Action Plan (AAP) (Completed)       Circulatory    Essential hypertension    Relevant Medications    atenolol (TENORMIN) 50 MG tablet    hydrochlorothiazide (HYDRODIURIL) 25 MG tablet    KLOR-CON 20 MEQ CR tablet    Other Relevant Orders    VENOUS COLLECTION (Completed)    Basic Metabolic Panel (BFP) (Completed)       Other    Anxiety    Relevant Medications    citalopram (CELEXA) 40 MG tablet   Other Visit Diagnoses     Other depression        Relevant Medications    citalopram (CELEXA) 40 MG tablet         1. Essential hypertension  Controlled on medications, check labs and refilled medications.  - atenolol (TENORMIN) 50 MG tablet; Take 1 tablet (50 mg) by mouth daily  Dispense: 90 tablet; Refill: 1  - hydrochlorothiazide (HYDRODIURIL) 25 MG tablet; Take 0.5 tablets (12.5 mg) by mouth daily  Dispense: 90 tablet; Refill: 1  - KLOR-CON 20 MEQ CR tablet; Take 1 tablet (20 mEq) by mouth daily  Dispense: 90 tablet; Refill: 1  - VENOUS COLLECTION  - Basic Metabolic Panel (BFP)    2. Other depression  Controlled, refilled.  - citalopram (CELEXA) 40 MG tablet; Take 1 tablet (40 mg) by mouth daily  Dispense: 90 tablet; Refill: 1    3. Anxiety  Controlled, refilled.  - citalopram (CELEXA) 40 MG tablet; Take 1 tablet (40 mg) by mouth daily  Dispense: 90 tablet; Refill: 1    4. Mild intermittent asthma without complication  She sometimes has symptoms, not well controlled. Add breo ellipta. She said her  is on this and knows it's covered by her ins.  - Asthma Action Plan (AAP)  - fluticasone-vilanterol (BREO ELLIPTA) 100-25 MCG/ACT inhaler; Inhale 1 puff into the lungs daily  Dispense: 3 each; Refill: 3             FUTURE APPOINTMENTS:       - Follow-up visit in 6  "months    No follow-ups on file.    Noemi Kirkpatrick MD  [facility]    Subjective     Nursing Notes:   Brandi Maloney  6/5/2023  9:39 AM  Signed  Chief Complaint   Patient presents with     Recheck Medication     Fasting med check  Pt is going to the dentist next week and asking about abx         Pre-visit Screening:  Immunizations:  up to date  Colonoscopy:  is up to date  Mammogram: is up to date  Asthma Action Test/Plan:  Yes  PHQ9:  Done today  GAD7:  Done today  Questioned patient about current smoking habits Pt. has never smoked.  Ok to leave detailed message on voice mail for today's visit only Yes, phone # [phone]             Ely Pollack is a 73 year old female who presents to clinic today for the following health issues     HPI     Here for medication check.  Blood pressure--controlled on medications. Is fasting today. Blood pressures at home--doesn't check.  Asthma--poor air quality recently, having more trouble and using her albuterol daily.  is on breo.   Citalopram-is doing well on the medications.        Review of Systems   Constitutional, HEENT, cardiovascular, pulmonary, gi and gu systems are negative, except as otherwise noted.      Objective    /72 (BP Location: Right arm, Patient Position: Sitting, Cuff Size: Adult Regular)   Pulse 63   Temp 97.8  F (36.6  C) (Temporal)   Ht 1.651 m (5' 5\")   Wt 94.3 kg (208 lb)   SpO2 95%   BMI 34.61 kg/m    Body mass index is 34.61 kg/m .  Physical Exam   GENERAL: healthy, alert and no distress  RESP: lungs clear to auscultation - no rales, rhonchi or wheezes  CV: regular rate and rhythm, normal S1 S2, no S3 or S4, no murmur, click or rub, no peripheral edema and peripheral pulses strong  MS: no gross musculoskeletal defects noted, no edema  NEURO: Normal strength and tone, mentation intact and speech normal  PSYCH: mentation appears normal, affect normal/bright    Results for orders placed or performed in visit " on 06/05/23   Basic Metabolic Panel (BFP)     Status: Abnormal   Result Value Ref Range    Carbon Dioxide 32.6 (A) 20 - 32 mmol/L    Creatinine 0.68 0.60 - 1.30 mg/dL    Glucose 112 (A) 60 - 99 mg/dL    Sodium 135.8 135 - 146 mmol/L    Potassium 4.70 3.5 - 5.3 mmol/L    Chloride 97.0 (A) 98 - 110 mmol/L    Urea Nitrogen 14 7 - 25 mg/dL    Calcium 9.9 8.6 - 10.3 mg/dL    BUN/Creatinine Ratio 20.6 6 - 32

## 2023-06-05 ENCOUNTER — OFFICE VISIT (OUTPATIENT)
Dept: FAMILY MEDICINE | Facility: CLINIC | Age: 73
End: 2023-06-05

## 2023-06-05 VITALS
WEIGHT: 208 LBS | DIASTOLIC BLOOD PRESSURE: 72 MMHG | OXYGEN SATURATION: 95 % | HEIGHT: 65 IN | HEART RATE: 63 BPM | TEMPERATURE: 97.8 F | BODY MASS INDEX: 34.66 KG/M2 | SYSTOLIC BLOOD PRESSURE: 120 MMHG

## 2023-06-05 DIAGNOSIS — F32.89 OTHER DEPRESSION: ICD-10-CM

## 2023-06-05 DIAGNOSIS — F41.9 ANXIETY: ICD-10-CM

## 2023-06-05 DIAGNOSIS — J45.20 MILD INTERMITTENT ASTHMA WITHOUT COMPLICATION: Primary | ICD-10-CM

## 2023-06-05 DIAGNOSIS — I10 ESSENTIAL HYPERTENSION: ICD-10-CM

## 2023-06-05 LAB
BUN SERPL-MCNC: 14 MG/DL (ref 7–25)
BUN/CREATININE RATIO: 20.6 (ref 6–32)
CALCIUM SERPL-MCNC: 9.9 MG/DL (ref 8.6–10.3)
CHLORIDE SERPLBLD-SCNC: 97 MMOL/L (ref 98–110)
CO2 SERPL-SCNC: 32.6 MMOL/L (ref 20–32)
CREAT SERPL-MCNC: 0.68 MG/DL (ref 0.6–1.3)
GLUCOSE SERPL-MCNC: 112 MG/DL (ref 60–99)
POTASSIUM SERPL-SCNC: 4.7 MMOL/L (ref 3.5–5.3)
SODIUM SERPL-SCNC: 135.8 MMOL/L (ref 135–146)

## 2023-06-05 PROCEDURE — 99214 OFFICE O/P EST MOD 30 MIN: CPT | Performed by: FAMILY MEDICINE

## 2023-06-05 PROCEDURE — 80048 BASIC METABOLIC PNL TOTAL CA: CPT | Performed by: FAMILY MEDICINE

## 2023-06-05 PROCEDURE — 36415 COLL VENOUS BLD VENIPUNCTURE: CPT | Performed by: FAMILY MEDICINE

## 2023-06-05 RX ORDER — HYDROCHLOROTHIAZIDE 25 MG/1
12.5 TABLET ORAL DAILY
Qty: 90 TABLET | Refills: 1 | Status: SHIPPED | OUTPATIENT
Start: 2023-06-05 | End: 2023-09-29

## 2023-06-05 RX ORDER — CITALOPRAM HYDROBROMIDE 40 MG/1
40 TABLET ORAL DAILY
Qty: 90 TABLET | Refills: 1 | Status: SHIPPED | OUTPATIENT
Start: 2023-06-05 | End: 2023-09-29

## 2023-06-05 RX ORDER — ATENOLOL 50 MG/1
50 TABLET ORAL DAILY
Qty: 90 TABLET | Refills: 1 | Status: SHIPPED | OUTPATIENT
Start: 2023-06-05 | End: 2023-09-29

## 2023-06-05 RX ORDER — FLUTICASONE FUROATE AND VILANTEROL 100; 25 UG/1; UG/1
1 POWDER RESPIRATORY (INHALATION) DAILY
Qty: 3 EACH | Refills: 3 | Status: SHIPPED | OUTPATIENT
Start: 2023-06-05 | End: 2023-09-29

## 2023-06-05 RX ORDER — CLOBETASOL PROPIONATE 0.5 MG/G
CREAM TOPICAL
COMMUNITY
Start: 2023-04-20 | End: 2024-04-30

## 2023-06-05 RX ORDER — POTASSIUM CHLORIDE 1500 MG/1
20 TABLET, EXTENDED RELEASE ORAL DAILY
Qty: 90 TABLET | Refills: 1 | Status: SHIPPED | OUTPATIENT
Start: 2023-06-05 | End: 2023-09-29

## 2023-06-05 ASSESSMENT — ASTHMA QUESTIONNAIRES: ACT_TOTALSCORE: 14

## 2023-06-05 ASSESSMENT — PATIENT HEALTH QUESTIONNAIRE - PHQ9
SUM OF ALL RESPONSES TO PHQ QUESTIONS 1-9: 1
5. POOR APPETITE OR OVEREATING: SEVERAL DAYS

## 2023-06-05 ASSESSMENT — ANXIETY QUESTIONNAIRES
3. WORRYING TOO MUCH ABOUT DIFFERENT THINGS: NOT AT ALL
5. BEING SO RESTLESS THAT IT IS HARD TO SIT STILL: NOT AT ALL
6. BECOMING EASILY ANNOYED OR IRRITABLE: NOT AT ALL
GAD7 TOTAL SCORE: 1
IF YOU CHECKED OFF ANY PROBLEMS ON THIS QUESTIONNAIRE, HOW DIFFICULT HAVE THESE PROBLEMS MADE IT FOR YOU TO DO YOUR WORK, TAKE CARE OF THINGS AT HOME, OR GET ALONG WITH OTHER PEOPLE: NOT DIFFICULT AT ALL
1. FEELING NERVOUS, ANXIOUS, OR ON EDGE: NOT AT ALL
2. NOT BEING ABLE TO STOP OR CONTROL WORRYING: NOT AT ALL
GAD7 TOTAL SCORE: 1
7. FEELING AFRAID AS IF SOMETHING AWFUL MIGHT HAPPEN: NOT AT ALL

## 2023-06-05 NOTE — NURSING NOTE
Chief Complaint   Patient presents with     Recheck Medication     Fasting med check  Pt is going to the dentist next week and asking about abx         Pre-visit Screening:  Immunizations:  up to date  Colonoscopy:  is up to date  Mammogram: is up to date  Asthma Action Test/Plan:  Yes  PHQ9:  Done today  GAD7:  Done today  Questioned patient about current smoking habits Pt. has never smoked.  Ok to leave detailed message on voice mail for today's visit only Yes, phone # [phone]

## 2023-06-05 NOTE — LETTER
My Asthma Action Plan    Name: Ely Pollack   YOB: 1950  Date: 6/5/2023   My doctor: Noemi Kirkpatrick MD   My clinic: [facility]        My Rescue Medicine:   Albuterol inhaler (Proair/Ventolin/Proventil HFA)  2-4 puffs EVERY 4 HOURS as needed. Use a spacer if recommended by your provider.   My Asthma Severity:   Intermittent / Exercise Induced  Know your asthma triggers: pollens and animal dander             GREEN ZONE   Good Control  I feel good  No cough or wheeze  Can work, sleep and play without asthma symptoms       Take your asthma control medicine every day.     If exercise triggers your asthma, take your rescue medication  15 minutes before exercise or sports, and  During exercise if you have asthma symptoms  Spacer to use with inhaler: If you have a spacer, make sure to use it with your inhaler             YELLOW ZONE Getting Worse  I have ANY of these:  I do not feel good  Cough or wheeze  Chest feels tight  Wake up at night   Keep taking your Green Zone medications  Start taking your rescue medicine:  every 20 minutes for up to 1 hour. Then every 4 hours for 24-48 hours.  If you stay in the Yellow Zone for more than 12-24 hours, contact your doctor.  If you do not return to the Green Zone in 12-24 hours or you get worse, start taking your oral steroid medicine if prescribed by your provider.           RED ZONE Medical Alert - Get Help  I have ANY of these:  I feel awful  Medicine is not helping  Breathing getting harder  Trouble walking or talking  Nose opens wide to breathe       Take your rescue medicine NOW  If your provider has prescribed an oral steroid medicine, start taking it NOW  Call your doctor NOW  If you are still in the Red Zone after 20 minutes and you have not reached your doctor:  Take your rescue medicine again and  Call 911 or go to the emergency room right away    See your regular doctor within 2 weeks of an Emergency Room or Urgent Care visit for  follow-up treatment.          Annual Reminders:  Meet with Asthma Educator,  Flu Shot in the Fall, consider Pneumonia Vaccination for patients with asthma (aged 19 and older).    Pharmacy: [facility] [id number] [place] [address]    Electronically signed by Noemi Kirkpatrick MD   Date: 06/05/23                    Asthma Triggers  How To Control Things That Make Your Asthma Worse    Triggers are things that make your asthma worse.  Look at the list below to help you find your triggers and   what you can do about them. You can help prevent asthma flare-ups by staying away from your triggers.      Trigger                                                          What you can do   Cigarette Smoke  Tobacco smoke can make asthma worse. Do not allow smoking in your home, car or around you.  Be sure no one smokes at a child s day care or school.  If you smoke, ask your health care provider for ways to help you quit.  Ask family members to quit too.  Ask your health care provider for a referral to Quit Plan to help you quit smoking, or call 7-569-395-PLAN.     Colds, Flu, Bronchitis  These are common triggers of asthma. Wash your hands often.  Don t touch your eyes, nose or mouth.  Get a flu shot every year.     Dust Mites  These are tiny bugs that live in cloth or carpet. They are too small to see. Wash sheets and blankets in hot water every week.   Encase pillows and mattress in dust mite proof covers.  Avoid having carpet if you can. If you have carpet, vacuum weekly.   Use a dust mask and HEPA vacuum.   Pollen and Outdoor Mold  Some people are allergic to trees, grass, or weed pollen, or molds. Try to keep your windows closed.  Limit time out doors when pollen count is high.   Ask you health care provider about taking medicine during allergy season.     Animal Dander  Some people are allergic to skin flakes, urine or saliva from pets with fur or feathers. Keep pets with fur or feathers out of your home.    If  you can t keep the pet outdoors, then keep the pet out of your bedroom.  Keep the bedroom door closed.  Keep pets off cloth furniture and away from stuffed toys.     Mice, Rats, and Cockroaches  Some people are allergic to the waste from these pests.   Cover food and garbage.  Clean up spills and food crumbs.  Store grease in the refrigerator.   Keep food out of the bedroom.   Indoor Mold  This can be a trigger if your home has high moisture. Fix leaking faucets, pipes, or other sources of water.   Clean moldy surfaces.  Dehumidify basement if it is damp and smelly.   Smoke, Strong Odors, and Sprays  These can reduce air quality. Stay away from strong odors and sprays, such as perfume, powder, hair spray, paints, smoke incense, paint, cleaning products, candles and new carpet.   Exercise or Sports  Some people with asthma have this trigger. Be active!  Ask your doctor about taking medicine before sports or exercise to prevent symptoms.    Warm up for 5-10 minutes before and after sports or exercise.     Other Triggers of Asthma  Cold air:  Cover your nose and mouth with a scarf.  Sometimes laughing or crying can be a trigger.  Some medicines and food can trigger asthma.

## 2023-06-09 RX ORDER — ATENOLOL 50 MG/1
50 TABLET ORAL DAILY
Qty: 90 TABLET | Refills: 0 | COMMUNITY
Start: 2023-06-09

## 2023-06-09 RX ORDER — CITALOPRAM HYDROBROMIDE 40 MG/1
40 TABLET ORAL DAILY
Qty: 90 TABLET | Refills: 0 | COMMUNITY
Start: 2023-06-09

## 2023-06-09 RX ORDER — HYDROCHLOROTHIAZIDE 25 MG/1
12.5 TABLET ORAL DAILY
Qty: 45 TABLET | Refills: 0 | COMMUNITY
Start: 2023-06-09

## 2023-09-27 NOTE — PROGRESS NOTES
Assessment & Plan   Problem List Items Addressed This Visit          Respiratory    Mild intermittent asthma without complication    Relevant Medications    fluticasone-vilanterol (BREO ELLIPTA) 100-25 MCG/ACT inhaler       Circulatory    Essential hypertension    Relevant Medications    aspirin 81 MG EC tablet    hydrochlorothiazide (HYDRODIURIL) 25 MG tablet    atenolol (TENORMIN) 50 MG tablet    KLOR-CON 20 MEQ CR tablet    Other Relevant Orders    VENOUS COLLECTION (Completed)    Lipid Panel (BFP)    Comprehensive Metobolic Panel (BFP)       Other    Anxiety    Relevant Medications    citalopram (CELEXA) 40 MG tablet    busPIRone (BUSPAR) 15 MG tablet     Other Visit Diagnoses       Other depression        Relevant Medications    citalopram (CELEXA) 40 MG tablet    busPIRone (BUSPAR) 15 MG tablet           1. Essential hypertension  Controlled on medications, refilled. Check labs.  - aspirin 81 MG EC tablet; Take 1 tablet (81 mg) by mouth 2 times daily  - hydrochlorothiazide (HYDRODIURIL) 25 MG tablet; Take 0.5 tablets (12.5 mg) by mouth daily  Dispense: 90 tablet; Refill: 1  - atenolol (TENORMIN) 50 MG tablet; Take 1 tablet (50 mg) by mouth daily  Dispense: 90 tablet; Refill: 1  - KLOR-CON 20 MEQ CR tablet; Take 1 tablet (20 mEq) by mouth daily  Dispense: 90 tablet; Refill: 1  - VENOUS COLLECTION  - Lipid Panel (BFP)  - Comprehensive Metobolic Panel (BFP)    2. Mild intermittent asthma without complication  Controlled, she said the breo really helps her.  - fluticasone-vilanterol (BREO ELLIPTA) 100-25 MCG/ACT inhaler; Inhale 1 puff into the lungs daily  Dispense: 3 each; Refill: 3    3. Other depression  Controlled on medications.  - citalopram (CELEXA) 40 MG tablet; Take 1 tablet (40 mg) by mouth daily  Dispense: 90 tablet; Refill: 1  - busPIRone (BUSPAR) 15 MG tablet; Take 0.5 tablets (7.5 mg) by mouth 2 times daily  Dispense: 90 tablet; Refill: 1    4. Anxiety  Controlled.  - citalopram (CELEXA) 40 MG  "tablet; Take 1 tablet (40 mg) by mouth daily  Dispense: 90 tablet; Refill: 1  - busPIRone (BUSPAR) 15 MG tablet; Take 0.5 tablets (7.5 mg) by mouth 2 times daily  Dispense: 90 tablet; Refill: 1            BMI:   Estimated body mass index is 35.11 kg/m  as calculated from the following:    Height as of 6/5/23: 1.651 m (5' 5\").    Weight as of this encounter: 95.7 kg (211 lb).         FUTURE APPOINTMENTS:       - Follow-up visit in 6 months.    No follow-ups on file.    Noemi Kirkpatrick MD  [facility]    Subjective     Nursing Notes:   China Vasques CMA  9/29/2023  7:44 AM  Signed  Chief Complaint   Patient presents with    Recheck Medication     Fasting today, refill medications     Pre-visit Screening:  Immunizations:  not up to date - tdap at pharmacy  Colonoscopy:  is up to date  Mammogram: is up to date  Asthma Action Test/Plan:  NA  PHQ9:  Done today  GAD7:  DOne today  Questioned patient about current smoking habits Pt. has never smoked.  Ok to leave detailed message on voice mail for today's visit only Yes, phone # [phone]       Ely Pollack is a 73 year old female who presents to clinic today for the following health issues     HPI   Here for medications and refills.  Depression/anxiety--she is doing well on medications, depression and anxiety are controlled.  High blood pressure--she is doing well on medications. Occ checks bp when in the drug store, controlled, just bought a machine for home, will check it there too.  Asthma--she really likes the breo and feels this helps with her asthma.    Has had a couple of dizzy spells. When she stands up quickly, a couple of times. No shortness of breath, no syncope.          Review of Systems   Constitutional, HEENT, cardiovascular, pulmonary, gi and gu systems are negative, except as otherwise noted.      Objective    /76 (BP Location: Right arm, Patient Position: Sitting, Cuff Size: Adult Large)   Pulse 65   Temp 97.9  F (36.6 "  C) (Temporal)   Wt 95.7 kg (211 lb)   SpO2 97%   BMI 35.11 kg/m    Body mass index is 35.11 kg/m .  Physical Exam   GENERAL: healthy, alert and no distress  RESP: lungs clear to auscultation - no rales, rhonchi or wheezes  CV: regular rate and rhythm, normal S1 S2, no S3 or S4, no murmur, click or rub, no peripheral edema and peripheral pulses strong  MS: no gross musculoskeletal defects noted, no edema  NEURO: Normal strength and tone, mentation intact and speech normal  PSYCH: mentation appears normal, affect normal/bright    No results found for any visits on 09/29/23.

## 2023-09-29 ENCOUNTER — OFFICE VISIT (OUTPATIENT)
Dept: FAMILY MEDICINE | Facility: CLINIC | Age: 73
End: 2023-09-29

## 2023-09-29 VITALS
TEMPERATURE: 97.9 F | WEIGHT: 211 LBS | HEART RATE: 65 BPM | OXYGEN SATURATION: 97 % | DIASTOLIC BLOOD PRESSURE: 76 MMHG | BODY MASS INDEX: 35.11 KG/M2 | SYSTOLIC BLOOD PRESSURE: 118 MMHG

## 2023-09-29 DIAGNOSIS — J45.20 MILD INTERMITTENT ASTHMA WITHOUT COMPLICATION: ICD-10-CM

## 2023-09-29 DIAGNOSIS — F41.9 ANXIETY: ICD-10-CM

## 2023-09-29 DIAGNOSIS — I10 ESSENTIAL HYPERTENSION: ICD-10-CM

## 2023-09-29 DIAGNOSIS — F32.89 OTHER DEPRESSION: ICD-10-CM

## 2023-09-29 DIAGNOSIS — R73.09 HIGH GLUCOSE: Primary | ICD-10-CM

## 2023-09-29 PROBLEM — E66.812 CLASS 2 SEVERE OBESITY DUE TO EXCESS CALORIES WITH SERIOUS COMORBIDITY IN ADULT (H): Status: ACTIVE | Noted: 2023-09-29

## 2023-09-29 PROBLEM — E66.01 CLASS 2 SEVERE OBESITY DUE TO EXCESS CALORIES WITH SERIOUS COMORBIDITY IN ADULT (H): Status: ACTIVE | Noted: 2023-09-29

## 2023-09-29 LAB
ALBUMIN SERPL-MCNC: 4 G/DL (ref 3.6–5.1)
ALBUMIN/GLOB SERPL: 1.7 {RATIO} (ref 1–2.5)
ALP SERPL-CCNC: 80 U/L (ref 33–130)
ALT 1742-6: 16 U/L (ref 0–32)
AST 1920-8: 18 U/L (ref 0–35)
BILIRUB SERPL-MCNC: 0.8 MG/DL (ref 0.2–1.2)
BUN SERPL-MCNC: 18 MG/DL (ref 7–25)
BUN/CREATININE RATIO: 20.9 (ref 6–32)
CALCIUM SERPL-MCNC: 9.1 MG/DL (ref 8.6–10.3)
CHLORIDE SERPLBLD-SCNC: 96.9 MMOL/L (ref 98–110)
CHOLEST SERPL-MCNC: 185 MG/DL (ref 0–199)
CHOLEST/HDLC SERPL: 3 {RATIO} (ref 0–5)
CO2 SERPL-SCNC: 31 MMOL/L (ref 20–32)
CREAT SERPL-MCNC: 0.86 MG/DL (ref 0.6–1.3)
GLOBULIN, CALCULATED - QUEST: 2.4 (ref 1.9–3.7)
GLUCOSE SERPL-MCNC: 114 MG/DL (ref 60–99)
HBA1C MFR BLD: 5.4 % (ref 4–7)
HDLC SERPL-MCNC: 59 MG/DL (ref 40–150)
LDLC SERPL CALC-MCNC: 101 MG/DL (ref 0–130)
POTASSIUM SERPL-SCNC: 4.56 MMOL/L (ref 3.5–5.3)
PROT SERPL-MCNC: 6.4 G/DL (ref 6.1–8.1)
SODIUM SERPL-SCNC: 135.2 MMOL/L (ref 135–146)
TRIGL SERPL-MCNC: 127 MG/DL (ref 0–149)

## 2023-09-29 PROCEDURE — 83036 HEMOGLOBIN GLYCOSYLATED A1C: CPT | Performed by: FAMILY MEDICINE

## 2023-09-29 PROCEDURE — 80061 LIPID PANEL: CPT | Performed by: FAMILY MEDICINE

## 2023-09-29 PROCEDURE — 80053 COMPREHEN METABOLIC PANEL: CPT | Performed by: FAMILY MEDICINE

## 2023-09-29 PROCEDURE — 99213 OFFICE O/P EST LOW 20 MIN: CPT | Mod: 25 | Performed by: FAMILY MEDICINE

## 2023-09-29 PROCEDURE — G0008 ADMIN INFLUENZA VIRUS VAC: HCPCS | Performed by: FAMILY MEDICINE

## 2023-09-29 PROCEDURE — 90662 IIV NO PRSV INCREASED AG IM: CPT | Performed by: FAMILY MEDICINE

## 2023-09-29 PROCEDURE — 36415 COLL VENOUS BLD VENIPUNCTURE: CPT | Performed by: FAMILY MEDICINE

## 2023-09-29 RX ORDER — ATENOLOL 50 MG/1
50 TABLET ORAL DAILY
Qty: 90 TABLET | Refills: 1 | Status: SHIPPED | OUTPATIENT
Start: 2023-09-29 | End: 2024-06-19

## 2023-09-29 RX ORDER — CITALOPRAM HYDROBROMIDE 40 MG/1
40 TABLET ORAL DAILY
Qty: 90 TABLET | Refills: 1 | Status: SHIPPED | OUTPATIENT
Start: 2023-09-29 | End: 2024-06-19

## 2023-09-29 RX ORDER — BUSPIRONE HYDROCHLORIDE 15 MG/1
7.5 TABLET ORAL 2 TIMES DAILY
Qty: 90 TABLET | Refills: 1 | Status: SHIPPED | OUTPATIENT
Start: 2023-09-29 | End: 2024-06-19

## 2023-09-29 RX ORDER — HYDROCHLOROTHIAZIDE 25 MG/1
12.5 TABLET ORAL DAILY
Qty: 90 TABLET | Refills: 1 | Status: SHIPPED | OUTPATIENT
Start: 2023-09-29 | End: 2024-06-19

## 2023-09-29 RX ORDER — FLUTICASONE FUROATE AND VILANTEROL 100; 25 UG/1; UG/1
1 POWDER RESPIRATORY (INHALATION) DAILY
Qty: 3 EACH | Refills: 3 | Status: SHIPPED | OUTPATIENT
Start: 2023-09-29 | End: 2024-06-19

## 2023-09-29 RX ORDER — ASPIRIN 81 MG/1
81 TABLET ORAL 2 TIMES DAILY
COMMUNITY
Start: 2023-09-29

## 2023-09-29 RX ORDER — POTASSIUM CHLORIDE 1500 MG/1
20 TABLET, EXTENDED RELEASE ORAL DAILY
Qty: 90 TABLET | Refills: 1 | Status: SHIPPED | OUTPATIENT
Start: 2023-09-29 | End: 2024-06-19

## 2023-09-29 ASSESSMENT — ANXIETY QUESTIONNAIRES
5. BEING SO RESTLESS THAT IT IS HARD TO SIT STILL: NOT AT ALL
6. BECOMING EASILY ANNOYED OR IRRITABLE: NOT AT ALL
2. NOT BEING ABLE TO STOP OR CONTROL WORRYING: NOT AT ALL
GAD7 TOTAL SCORE: 2
7. FEELING AFRAID AS IF SOMETHING AWFUL MIGHT HAPPEN: NOT AT ALL
IF YOU CHECKED OFF ANY PROBLEMS ON THIS QUESTIONNAIRE, HOW DIFFICULT HAVE THESE PROBLEMS MADE IT FOR YOU TO DO YOUR WORK, TAKE CARE OF THINGS AT HOME, OR GET ALONG WITH OTHER PEOPLE: NOT DIFFICULT AT ALL
GAD7 TOTAL SCORE: 2
3. WORRYING TOO MUCH ABOUT DIFFERENT THINGS: NOT AT ALL
1. FEELING NERVOUS, ANXIOUS, OR ON EDGE: SEVERAL DAYS

## 2023-09-29 ASSESSMENT — PATIENT HEALTH QUESTIONNAIRE - PHQ9
5. POOR APPETITE OR OVEREATING: SEVERAL DAYS
SUM OF ALL RESPONSES TO PHQ QUESTIONS 1-9: 5

## 2023-09-29 NOTE — NURSING NOTE
Chief Complaint   Patient presents with    Recheck Medication     Fasting today, refill medications     Pre-visit Screening:  Immunizations:  not up to date - tdap at pharmacy  Colonoscopy:  is up to date  Mammogram: is up to date  Asthma Action Test/Plan:  NA  PHQ9:  Done today  GAD7:  DOne today  Questioned patient about current smoking habits Pt. has never smoked.  Ok to leave detailed message on voice mail for today's visit only Yes, phone # [phone]

## 2023-10-02 ENCOUNTER — TELEPHONE (OUTPATIENT)
Dept: FAMILY MEDICINE | Facility: CLINIC | Age: 73
End: 2023-10-02

## 2023-10-04 NOTE — TELEPHONE ENCOUNTER
Central Prior Authorization Team   Phone: [phone]    Insurance covers BRAND without prior authorization    PRIOR AUTHORIZATION DENIED    Medication: FLUTICASONE FUROATE-VILANTEROL 100-25 MCG/ACT IN AEPB  Insurance Company: [employer] [place] - Phone [phone] Fax [phone]  Denial Date: 9/30/2023  Denial Rational: MUST TRY/FAIL TWO ALTERNATIVES - BREO ELLIPTA, ADVAIR HFA/DISKUS, FLUTICASONE/SALMETEROL (AIRDUO RESPICLICK), DULERA, BUDESONIDE/FORMOTEROL, TRELEGY ELLIPTA      Appeal Information: IF PROVIDER WOULD LIKE TO APPEAL THIS DECISION PLEASE PROVIDE THE PA TEAM WITH A LETTER OF MEDICAL NECESSITY      Patient Notified: No

## 2023-11-06 ENCOUNTER — TRANSFERRED RECORDS (OUTPATIENT)
Dept: HEALTH INFORMATION MANAGEMENT | Facility: CLINIC | Age: 73
End: 2023-11-06
Payer: COMMERCIAL

## 2023-12-01 ENCOUNTER — TRANSFERRED RECORDS (OUTPATIENT)
Dept: FAMILY MEDICINE | Facility: CLINIC | Age: 73
End: 2023-12-01

## 2023-12-07 ENCOUNTER — TRANSFERRED RECORDS (OUTPATIENT)
Dept: HEALTH INFORMATION MANAGEMENT | Facility: CLINIC | Age: 73
End: 2023-12-07
Payer: COMMERCIAL

## 2024-04-02 ENCOUNTER — OFFICE VISIT (OUTPATIENT)
Dept: FAMILY MEDICINE | Facility: CLINIC | Age: 74
End: 2024-04-02

## 2024-04-02 VITALS
WEIGHT: 202 LBS | HEART RATE: 73 BPM | TEMPERATURE: 97.9 F | SYSTOLIC BLOOD PRESSURE: 124 MMHG | OXYGEN SATURATION: 95 % | BODY MASS INDEX: 33.61 KG/M2 | DIASTOLIC BLOOD PRESSURE: 82 MMHG

## 2024-04-02 DIAGNOSIS — R05.1 ACUTE COUGH: Primary | ICD-10-CM

## 2024-04-02 LAB
COVID-19: NEGATIVE
FLUAV AG UPPER RESP QL IA.RAPID: NORMAL
FLUBV AG UPPER RESP QL IA.RAPID: NORMAL
RESPIRATORY SYNCYTIAL VIRUS: NEGATIVE

## 2024-04-02 PROCEDURE — 87804 INFLUENZA ASSAY W/OPTIC: CPT | Mod: 59 | Performed by: FAMILY MEDICINE

## 2024-04-02 PROCEDURE — 87635 SARS-COV-2 COVID-19 AMP PRB: CPT | Performed by: FAMILY MEDICINE

## 2024-04-02 PROCEDURE — 87804 INFLUENZA ASSAY W/OPTIC: CPT | Performed by: FAMILY MEDICINE

## 2024-04-02 PROCEDURE — 99214 OFFICE O/P EST MOD 30 MIN: CPT | Performed by: FAMILY MEDICINE

## 2024-04-02 PROCEDURE — 87634 RSV DNA/RNA AMP PROBE: CPT | Performed by: FAMILY MEDICINE

## 2024-04-02 RX ORDER — BENZONATATE 100 MG/1
100 CAPSULE ORAL 3 TIMES DAILY PRN
Qty: 20 CAPSULE | Refills: 0 | Status: SHIPPED | OUTPATIENT
Start: 2024-04-02 | End: 2024-06-19

## 2024-04-02 NOTE — PROGRESS NOTES
"Assessment & Plan   Problem List Items Addressed This Visit    None  Visit Diagnoses       Acute cough    -  Primary    Relevant Medications    benzonatate (TESSALON) 100 MG capsule    Other Relevant Orders    Influenza A and B (BFP) (Completed)    Respiratory Syncytial Virus - RSV (Completed)    COVID-19 (BFP) (Completed)           1. Acute cough  Negative testing, viral uri. Tesalon for cough.  - Influenza A and B (BFP)  - Respiratory Syncytial Virus - RSV  - COVID-19 (BFP)  - benzonatate (TESSALON) 100 MG capsule; Take 1 capsule (100 mg) by mouth 3 times daily as needed  Dispense: 20 capsule; Refill: 0            BMI  Estimated body mass index is 33.61 kg/m  as calculated from the following:    Height as of 6/5/23: 1.651 m (5' 5\").    Weight as of this encounter: 91.6 kg (202 lb).         FUTURE APPOINTMENTS:       - Follow-up visit as needed, if changes or worsening.    No follow-ups on file.    Noemi Kirkpatrick MD  [facility]    Subjective     Nursing Notes:   China Vasques, [facility]  4/2/2024  1:07 PM  Signed  Chief Complaint   Patient presents with    URI     She gt back from [place] 1.5 weeks ago, cough and congestion started Saturday, low grade fever, sore throat, loss of voice      Pre-visit Screening:  Immunizations:  up to date  Colonoscopy:  is due and to be scheduled by patient for later completion  Mammogram: is up to date  Asthma Action Test/Plan:  NA  PHQ9:  NA  GAD7:  NA  Questioned patient about current smoking habits Pt. has never smoked.  Ok to leave detailed message on voice mail for today's visit only Yes, phone # [phone]       Ely Pollack is a 73 year old female who presents to clinic today for the following health issues   HPI     Was recently in [place],  with similar sx. Her sx started last week Thursday, then she felt ok, then the cough got worse. No fevers. History asthma.        Review of Systems   Constitutional, HEENT, cardiovascular, pulmonary, gi and " gu systems are negative, except as otherwise noted.      Objective    /82 (BP Location: Right arm, Patient Position: Sitting, Cuff Size: Adult Large)   Pulse 73   Temp 97.9  F (36.6  C) (Temporal)   Wt 91.6 kg (202 lb)   SpO2 95%   BMI 33.61 kg/m    Body mass index is 33.61 kg/m .  Physical Exam   GENERAL: alert and no distress  RESP: lungs clear to auscultation - no rales, rhonchi or wheezes  CV: regular rate and rhythm, normal S1 S2, no S3 or S4, no murmur, click or rub, no peripheral edema  MS: no gross musculoskeletal defects noted, no edema  NEURO: Normal strength and tone, mentation intact and speech normal  PSYCH: mentation appears normal, affect normal/bright    Results for orders placed or performed in visit on 04/02/24   Influenza A and B (BFP)     Status: None   Result Value Ref Range    Influenza A NEG neg    Influenza B NEG neg   Respiratory Syncytial Virus - RSV     Status: None   Result Value Ref Range    RESPIRATORY SYNCYTIAL VIRUS Negative    COVID-19 (BFP)     Status: None   Result Value Ref Range    COVID-19 Negative

## 2024-04-02 NOTE — NURSING NOTE
Chief Complaint   Patient presents with    URI     She gt back from [place] 1.5 weeks ago, cough and congestion started Saturday, low grade fever, sore throat, loss of voice      Pre-visit Screening:  Immunizations:  up to date  Colonoscopy:  is due and to be scheduled by patient for later completion  Mammogram: is up to date  Asthma Action Test/Plan:  NA  PHQ9:  NA  GAD7:  NA  Questioned patient about current smoking habits Pt. has never smoked.  Ok to leave detailed message on voice mail for today's visit only Yes, phone # [phone]

## 2024-04-30 ENCOUNTER — OFFICE VISIT (OUTPATIENT)
Dept: FAMILY MEDICINE | Facility: CLINIC | Age: 74
End: 2024-04-30

## 2024-04-30 VITALS
HEART RATE: 55 BPM | WEIGHT: 200 LBS | BODY MASS INDEX: 33.28 KG/M2 | DIASTOLIC BLOOD PRESSURE: 78 MMHG | SYSTOLIC BLOOD PRESSURE: 122 MMHG | OXYGEN SATURATION: 96 % | TEMPERATURE: 97.1 F

## 2024-04-30 DIAGNOSIS — R30.0 DYSURIA: Primary | ICD-10-CM

## 2024-04-30 LAB
APPEARANCE UR: ABNORMAL
BACTERIA, UR: ABNORMAL
BILIRUB UR QL: ABNORMAL
CASTS/LPF: ABNORMAL
COLOR UR: YELLOW
EP/HPF: ABNORMAL
GLUCOSE URINE: ABNORMAL MG/DL
HGB UR QL: ABNORMAL
KETONES UR QL: ABNORMAL MG/DL
MISC.: ABNORMAL
NITRITE UR QL STRIP: ABNORMAL
PH UR STRIP: 8 PH (ref 5–7)
PROT UR QL: 30 MG/DL
RBC, UR MICRO: ABNORMAL (ref ?–2)
SP GR UR STRIP: 1.02
UROBILINOGEN UR QL STRIP: 1 EU/DL (ref 0.2–1)
WBC #/AREA URNS HPF: ABNORMAL /[HPF]
WBC, UR MICRO: ABNORMAL (ref ?–2)

## 2024-04-30 PROCEDURE — 99213 OFFICE O/P EST LOW 20 MIN: CPT | Performed by: FAMILY MEDICINE

## 2024-04-30 PROCEDURE — 81001 URINALYSIS AUTO W/SCOPE: CPT | Performed by: FAMILY MEDICINE

## 2024-04-30 RX ORDER — CEPHALEXIN 500 MG/1
500 CAPSULE ORAL 2 TIMES DAILY
Qty: 14 CAPSULE | Refills: 0 | Status: SHIPPED | OUTPATIENT
Start: 2024-04-30 | End: 2024-06-19

## 2024-04-30 NOTE — NURSING NOTE
Chief Complaint   Patient presents with    Urinary Problem     Pt here with uti. Started the other night. Feels pretty pressure and urine is cloudy.

## 2024-04-30 NOTE — PROGRESS NOTES
SUBJECTIVE: Ely Pollack is a 74 year old female who complains of heaviness in bladder, urinary frequency, urgency and no dysuria x 2 days, without flank pain, fever, chills, or abnormal vaginal discharge or bleeding. Urine looks cloudy    Patient Active Problem List   Diagnosis    History of bilateral knee arthroplasty    Essential hypertension    Anxiety    Chronic back pain, unspecified back location, unspecified back pain laterality    Bee sting allergy    Leg cramps    Chronic neck pain    Mild intermittent asthma without complication    Cervical radiculopathy at C7    Choroidal nevus, right eye    Degenerative arthritis of lumbar spine    Degenerative drusen, bilateral    Dermatochalasis of both upper eyelids    History of squamous cell carcinoma of skin    Major depressive disorder, recurrent episode, moderate (H)    Nuclear sclerotic cataract, bilateral    Presbyopia    Regular astigmatism, bilateral    Squamous cell carcinoma of lower leg, left    S/P revision of total knee, right    Class 2 severe obesity due to excess calories with serious comorbidity in adult (H)       Past Medical History:   Diagnosis Date    Arthritis     Hypertension     Cardiology No    Other chronic pain     Pain for 10 years    PONV (postoperative nausea and vomiting)     Uncomplicated asthma        Family History   Problem Relation Age of Onset    Bone Cancer Mother     Chronic Obstructive Pulmonary Disease Father     Heart Disease Brother     Kidney Disease Brother     Diabetes Type 2  Brother     Alcoholism Brother     Heart Disease Brother     Kidney Disease Brother     Alcoholism Brother     Diabetes Type 2  Brother     Colon Cancer Maternal Grandmother        Social History     Socioeconomic History    Marital status:      Spouse name: Not on file    Number of children: Not on file    Years of education: Not on file    Highest education level: Not on file   Occupational History    Not on file   Tobacco Use     Smoking status: Never     Passive exposure: Never    Smokeless tobacco: Never   Substance and Sexual Activity    Alcohol use: Yes     Comment: 3 drinks weekly    Drug use: No    Sexual activity: Not on file   Other Topics Concern    Not on file   Social History Narrative    Not on file     Social Determinants of Health     Financial Resource Strain: High Risk (1/1/2022)    [facility], [facility]    Financial Resource Strain     Difficulty of Paying Living Expenses: Not on file     Difficulty of Paying Living Expenses: Not on file   Food Insecurity: Not on file   Transportation Needs: Not on file   Physical Activity: Not on file   Stress: Not on file   Social Connections: Unknown (1/1/2022)    [facility] from [facility], [facility]    Social Connections     Frequency of Communication with Friends and Family: Not on file   Interpersonal Safety: Not on file   Housing Stability: Not on file       Past Surgical History:   Procedure Laterality Date    ARTHROPLASTY KNEE BILATERAL Bilateral 3/29/2016    Procedure: ARTHROPLASTY KNEE BILATERAL;  Surgeon: Darryn Cortez MD;  Location: [facility] OR    ARTHROPLASTY REVISION KNEE Right 1/18/2023    Procedure: revision right total knee arthroplasty, total component revision for aseptic loosening;  Surgeon: Ben Lindsay MD;  Location: [facility] OR    AS DECOMPRESS SPINAL CORD,1 SEG      Thorasic    AS SPINAL FUSION,ANT,EA ADNL LEVEL      Thorasic    BUNIONECTOMY Left        Current Outpatient Medications   Medication Sig Dispense Refill    albuterol (PROAIR HFA/PROVENTIL HFA/VENTOLIN HFA) 108 (90 Base) MCG/ACT inhaler Inhale 2 puffs into the lungs every 6 hours as needed for shortness of breath / dyspnea or wheezing 18 g 3    albuterol (PROVENTIL) (2.5 MG/3ML) 0.083% neb solution Take 1 vial (2.5 mg) by nebulization every 6 hours as  needed for shortness of breath / dyspnea or wheezing 90 mL 3    aspirin 81 MG EC tablet Take 1 tablet (81 mg) by mouth 2 times daily      atenolol (TENORMIN) 50 MG tablet Take 1 tablet (50 mg) by mouth daily 90 tablet 1    benzonatate (TESSALON) 100 MG capsule Take 1 capsule (100 mg) by mouth 3 times daily as needed 20 capsule 0    busPIRone (BUSPAR) 15 MG tablet Take 0.5 tablets (7.5 mg) by mouth 2 times daily 90 tablet 1    citalopram (CELEXA) 40 MG tablet Take 1 tablet (40 mg) by mouth daily 90 tablet 1    fluticasone (FLONASE) 50 MCG/ACT nasal spray Spray 1 spray into both nostrils daily as needed for allergies      fluticasone-vilanterol (BREO ELLIPTA) 100-25 MCG/ACT inhaler Inhale 1 puff into the lungs daily 3 each 3    hydrochlorothiazide (HYDRODIURIL) 25 MG tablet Take 0.5 tablets (12.5 mg) by mouth daily 90 tablet 1    KLOR-CON 20 MEQ CR tablet Take 1 tablet (20 mEq) by mouth daily 90 tablet 1    levocetirizine (XYZAL) 5 MG tablet Take 5 mg by mouth daily      MAGNESIUM GLYCINATE PO Take 400 mg by mouth 2 times daily as needed (LEG CRAMPS)      melatonin 5 MG tablet Take 10 mg by mouth nightly as needed for sleep (5MG X 2 = 10MG)      omeprazole (PRILOSEC) 20 MG DR capsule Take 20 mg by mouth daily       No current facility-administered medications for this visit.        Allergies: Bee venom, Clavulanic acid, Oxycodone, Seasonal allergies, and Latex      Immunization History   Administered Date(s) Administered    COVID-19 Monovalent 18+ (Moderna) 02/13/2021, 03/13/2021, 11/11/2021    Flu 65+ Years 11/17/2017, 12/04/2019    Hepatitis A (ADULT 19+) 09/25/2017, 05/09/2018    Influenza (High Dose) 3 valent vaccine 11/08/2018, 12/10/2021    Influenza (IIV3) PF 11/25/2013    Influenza Vaccine 65+ (FLUAD) 11/02/2020    Influenza Vaccine 65+ (Fluzone HD) 12/10/2021, 10/13/2022, 09/29/2023    Influenza Vaccine >6 months,quad, PF 12/16/2014, 10/14/2015, 12/09/2016    Pneumo Conj 13-V (2010&after) 09/25/2017     Pneumococcal 23 valent 04/24/2019    RSV Vaccine (Arexvy) 10/25/2023    Td (Adult), Adsorbed 03/26/2013    Zoster recombinant adjuvanted (SHINGRIX) 04/24/2019, 06/25/2019    Zoster vaccine, live 09/25/2012        OBJECTIVE: /78 (BP Location: Right arm, Patient Position: Sitting, Cuff Size: Adult Large)   Pulse 55   Temp 97.1  F (36.2  C) (Temporal)   Wt 90.7 kg (200 lb)   SpO2 96%   BMI 33.28 kg/m   Appears well, in no apparent distress.  Vital signs are normal. The abdomen is soft without tenderness, guarding, mass, rebound or organomegaly. No CVA tenderness or inguinal adenopathy noted. Urine dipstick shows positive for WBC's, positive for RBC's, positive for nitrates, positive for leukocytes, and positive for bacturia.  Micro exam: 25-50 WBC's per HPF, 5-10 RBC's per HPF, and  mod vinod+ bacteria.     ASSESSMENT: UTI uncomplicated without evidence of pyelonephritis    PLAN: Treatment per orders - also push fluids, may use Pyridium OTC prn. Call or return to clinic prn if these symptoms worsen or fail to improve as anticipated.

## 2024-05-03 LAB — URINE VOIDED CULTURE: ABNORMAL

## 2024-05-26 DIAGNOSIS — F32.89 OTHER DEPRESSION: ICD-10-CM

## 2024-05-26 DIAGNOSIS — F41.9 ANXIETY: ICD-10-CM

## 2024-05-26 DIAGNOSIS — I10 ESSENTIAL HYPERTENSION: ICD-10-CM

## 2024-05-28 RX ORDER — POTASSIUM CHLORIDE 1500 MG/1
20 TABLET, EXTENDED RELEASE ORAL DAILY
COMMUNITY
Start: 2024-05-28

## 2024-05-28 RX ORDER — ATENOLOL 50 MG/1
50 TABLET ORAL DAILY
COMMUNITY
Start: 2024-05-28

## 2024-05-28 RX ORDER — CITALOPRAM HYDROBROMIDE 40 MG/1
40 TABLET ORAL DAILY
COMMUNITY
Start: 2024-05-28

## 2024-05-28 NOTE — TELEPHONE ENCOUNTER
Ely Pollack is requesting a refill of:    Refused Prescriptions:                       Disp   Refills    KLOR-CON M20 20 MEQ CR tablet [Pharmacy Me*                Sig: TAKE 1 TABLET BY MOUTH DAILY  Refused By: HECTOR QUIÑONEZ  Reason for Refusal: Patient needs appointment    citalopram (CELEXA) 40 MG tablet [Pharmacy*                Sig: TAKE 1 TABLET BY MOUTH EVERY DAY  Refused By: HECTOR QUIÑONEZ  Reason for Refusal: Patient needs appointment    atenolol (TENORMIN) 50 MG tablet [Pharmacy*                Sig: TAKE 1 TABLET BY MOUTH EVERY DAY  Refused By: HECTOR QUIÑONEZ  Reason for Refusal: Patient needs appointment    Pt due for OV

## 2024-06-01 ENCOUNTER — HEALTH MAINTENANCE LETTER (OUTPATIENT)
Age: 74
End: 2024-06-01

## 2024-06-13 NOTE — PROGRESS NOTES
Assessment & Plan   Problem List Items Addressed This Visit          Respiratory    Mild intermittent asthma without complication    Relevant Medications    fluticasone-vilanterol (BREO ELLIPTA) 100-25 MCG/ACT inhaler    Other Relevant Orders    Asthma Action Plan (AAP) (Completed)       Circulatory    Essential hypertension    Relevant Medications    atenolol (TENORMIN) 50 MG tablet    hydrochlorothiazide (HYDRODIURIL) 25 MG tablet    KLOR-CON M20 20 MEQ CR tablet    Other Relevant Orders    VENOUS COLLECTION (Completed)    Basic Metabolic Panel (BFP) (Completed)       Other    Anxiety    Relevant Medications    busPIRone (BUSPAR) 15 MG tablet    citalopram (CELEXA) 40 MG tablet     Other Visit Diagnoses       Medicare annual wellness visit, subsequent    -  Primary    Other depression        Relevant Medications    busPIRone (BUSPAR) 15 MG tablet    citalopram (CELEXA) 40 MG tablet    Acute cough        Relevant Medications    benzonatate (TESSALON) 100 MG capsule    fluticasone-vilanterol (BREO ELLIPTA) 100-25 MCG/ACT inhaler    Screening for colon cancer        Relevant Orders    COLOGUARD([employer])    Pain of right lower leg        Relevant Orders    Radiology Referral (Affiliate Use Only)     Lower Extremity Venous Duplex Right (Completed)           1. Medicare annual wellness visit, subsequent  Completed.    2. Essential hypertension  Controlled on medications, check labs, refilled.  - atenolol (TENORMIN) 50 MG tablet; Take 1 tablet (50 mg) by mouth daily  Dispense: 90 tablet; Refill: 1  - hydrochlorothiazide (HYDRODIURIL) 25 MG tablet; Take 0.5 tablets (12.5 mg) by mouth daily  Dispense: 45 tablet; Refill: 1  - KLOR-CON M20 20 MEQ CR tablet; Take 1 tablet (20 mEq) by mouth daily  Dispense: 90 tablet; Refill: 1  - VENOUS COLLECTION  - Basic Metabolic Panel (BFP)    3. Other depression  Controlled, refilled medications.  - busPIRone (BUSPAR) 15 MG tablet; Take 0.5 tablets (7.5 mg) by mouth 2 times  daily  Dispense: 90 tablet; Refill: 1  - citalopram (CELEXA) 40 MG tablet; Take 1 tablet (40 mg) by mouth daily  Dispense: 90 tablet; Refill: 1    4. Anxiety  Controlled on medications.  - busPIRone (BUSPAR) 15 MG tablet; Take 0.5 tablets (7.5 mg) by mouth 2 times daily  Dispense: 90 tablet; Refill: 1  - citalopram (CELEXA) 40 MG tablet; Take 1 tablet (40 mg) by mouth daily  Dispense: 90 tablet; Refill: 1    5. Acute cough  Refilled.  - benzonatate (TESSALON) 100 MG capsule; Take 1 capsule (100 mg) by mouth 3 times daily as needed  Dispense: 20 capsule; Refill: 0    6. Mild intermittent asthma without complication  Symptoms are controlled, refilled.  - fluticasone-vilanterol (BREO ELLIPTA) 100-25 MCG/ACT inhaler; Inhale 1 puff into the lungs daily  Dispense: 3 each; Refill: 3  - Asthma Action Plan (AAP)    7. Screening for colon cancer    - CHUY([employer])    8. Pain of right lower leg  Ultrasound to rule out dvt. Offered physical therapy, she will go back first to see her ortho docotr.  - Radiology Referral (Affiliate Use Only)  - US Lower Extremity Venous Duplex Right; Future              FUTURE APPOINTMENTS:       - Follow-up visit in 6 months.     No follow-ups on file.    Noemi Kirkpatrick MD  [facility]    Subjective     Nursing Notes:   China Vasques CMA  6/19/2024  9:56 AM  Signed  Chief Complaint   Patient presents with    Recheck Medication     Refill medications, fasting today      Pre-visit Screening:  Immunizations:  up to date  Colonoscopy: ordered today  Mammogram: is up to date  Asthma Action Test/Plan:  NA  PHQ9:  Done today  GAD7:  Done today  Questioned patient about current smoking habits Pt. has never smoked.  Ok to leave detailed message on voice mail for today's visit only Yes, phone # [phone]       Ely Pollack is a 74 year old female who presents to clinic today for the following health issues   HPI     Here for a medication check.    Has fallen  "lately, shuffling her feet, tripping more lately. Not sure why she is tripping.   Still has some pain in the lower legs , has had both knees surgery. Also had spinal surgery in 2005. Had a fusion. Still has some pain right lower lateral leg. Has some swelling in the knee but not in the leg. Has pain lateral leg. Aspercreme helps it. Pain has been for 3-6 months. Started after her knee surgery.    No tremor. She thinks she is falling due to balance. Doesn't want physical therapy, will first go back to physical therapy.          Review of Systems   Constitutional, HEENT, cardiovascular, pulmonary, gi and gu systems are negative, except as otherwise noted.      Objective    /72 (BP Location: Right arm, Patient Position: Sitting, Cuff Size: Adult Large)   Pulse 64   Temp 97.8  F (36.6  C) (Temporal)   Ht 1.638 m (5' 4.5\")   Wt 91.6 kg (202 lb)   SpO2 97%   BMI 34.14 kg/m    Body mass index is 34.14 kg/m .  Physical Exam   GENERAL: alert and no distress  RESP: lungs clear to auscultation - no rales, rhonchi or wheezes  CV: regular rate and rhythm, normal S1 S2, no S3 or S4, no murmur, click or rub, no peripheral edema  MS: no gross musculoskeletal defects noted, no edema  NEURO: Normal strength and tone, mentation intact and speech normal  PSYCH: mentation appears normal, affect normal/bright  Bilateral lower extremities: no pedal edema, no swelling and no tenderness to calves    Results for orders placed or performed in visit on 06/19/24   US Lower Extremity Venous Duplex Right     Status: None    Narrative    EXAM: US LOWER EXTREMITY VENOUS DUPLEX RIGHT  LOCATION: [facility]  DATE: 6/19/2024    INDICATION:  Pain of right lower leg  COMPARISON: None.  TECHNIQUE: Venous Duplex ultrasound of the right lower extremity with and without compression, augmentation and duplex. Color flow and spectral Doppler with waveform analysis performed.    FINDINGS: Exam includes the common femoral, " femoral, popliteal, and contralateral common femoral veins as well as segmentally visualized deep calf veins and greater saphenous vein.     RIGHT: No deep vein thrombosis. No superficial thrombophlebitis. No popliteal cyst.      Impression    IMPRESSION:  1.  No deep venous thrombosis in the right lower extremity.   Results for orders placed or performed in visit on 06/19/24   Basic Metabolic Panel (BFP)     Status: Abnormal   Result Value Ref Range    Carbon Dioxide 32.2 (A) 20 - 32 mmol/L    Creatinine 0.71 0.60 - 1.30 mg/dL    Glucose 94 60 - 99 mg/dL    Sodium 133.0 (A) 135 - 146 mmol/L    Potassium 5.21 3.5 - 5.3 mmol/L    Chloride 95.7 (A) 98 - 110 mmol/L    Urea Nitrogen 15 7 - 25 mg/dL    Calcium 9.7 8.6 - 10.3 mg/dL    BUN/Creatinine Ratio 21 6 - 32

## 2024-06-17 DIAGNOSIS — F41.9 ANXIETY: ICD-10-CM

## 2024-06-17 DIAGNOSIS — F32.89 OTHER DEPRESSION: ICD-10-CM

## 2024-06-18 RX ORDER — BUSPIRONE HYDROCHLORIDE 15 MG/1
7.5 TABLET ORAL 2 TIMES DAILY
COMMUNITY
Start: 2024-06-18

## 2024-06-18 NOTE — TELEPHONE ENCOUNTER
Ely Pollack is requesting a refill of:    Refused Prescriptions:                       Disp   Refills    busPIRone (BUSPAR) 15 MG tablet [Pharmacy *                Sig: TAKE 0.5 TABLETS (7.5 MG) BY MOUTH 2 TIMES DAILY  Refused By: HECTOR QUIÑONEZ  Reason for Refusal: Patient needs appointment    Pt has appt scheduled for tomorrow, will refill at upcoming appt

## 2024-06-19 ENCOUNTER — OFFICE VISIT (OUTPATIENT)
Dept: FAMILY MEDICINE | Facility: CLINIC | Age: 74
End: 2024-06-19

## 2024-06-19 ENCOUNTER — ANCILLARY PROCEDURE (OUTPATIENT)
Dept: ULTRASOUND IMAGING | Facility: CLINIC | Age: 74
End: 2024-06-19
Attending: FAMILY MEDICINE
Payer: COMMERCIAL

## 2024-06-19 VITALS
HEART RATE: 64 BPM | SYSTOLIC BLOOD PRESSURE: 122 MMHG | HEIGHT: 65 IN | DIASTOLIC BLOOD PRESSURE: 72 MMHG | TEMPERATURE: 97.8 F | WEIGHT: 202 LBS | OXYGEN SATURATION: 97 % | BODY MASS INDEX: 33.66 KG/M2

## 2024-06-19 DIAGNOSIS — F41.9 ANXIETY: ICD-10-CM

## 2024-06-19 DIAGNOSIS — J45.20 MILD INTERMITTENT ASTHMA WITHOUT COMPLICATION: ICD-10-CM

## 2024-06-19 DIAGNOSIS — Z12.11 SCREENING FOR COLON CANCER: ICD-10-CM

## 2024-06-19 DIAGNOSIS — Z00.00 MEDICARE ANNUAL WELLNESS VISIT, SUBSEQUENT: Primary | ICD-10-CM

## 2024-06-19 DIAGNOSIS — I10 ESSENTIAL HYPERTENSION: ICD-10-CM

## 2024-06-19 DIAGNOSIS — F32.89 OTHER DEPRESSION: ICD-10-CM

## 2024-06-19 DIAGNOSIS — R05.1 ACUTE COUGH: ICD-10-CM

## 2024-06-19 DIAGNOSIS — M79.661 PAIN OF RIGHT LOWER LEG: ICD-10-CM

## 2024-06-19 LAB
BUN SERPL-MCNC: 15 MG/DL (ref 7–25)
BUN/CREATININE RATIO: 21 (ref 6–32)
CALCIUM SERPL-MCNC: 9.7 MG/DL (ref 8.6–10.3)
CHLORIDE SERPLBLD-SCNC: 95.7 MMOL/L (ref 98–110)
CO2 SERPL-SCNC: 32.2 MMOL/L (ref 20–32)
CREAT SERPL-MCNC: 0.71 MG/DL (ref 0.6–1.3)
GLUCOSE SERPL-MCNC: 94 MG/DL (ref 60–99)
POTASSIUM SERPL-SCNC: 5.21 MMOL/L (ref 3.5–5.3)
SODIUM SERPL-SCNC: 133 MMOL/L (ref 135–146)

## 2024-06-19 PROCEDURE — 93971 EXTREMITY STUDY: CPT | Mod: RT

## 2024-06-19 PROCEDURE — 36415 COLL VENOUS BLD VENIPUNCTURE: CPT | Performed by: FAMILY MEDICINE

## 2024-06-19 PROCEDURE — 80048 BASIC METABOLIC PNL TOTAL CA: CPT | Performed by: FAMILY MEDICINE

## 2024-06-19 PROCEDURE — G0439 PPPS, SUBSEQ VISIT: HCPCS | Performed by: FAMILY MEDICINE

## 2024-06-19 PROCEDURE — 99214 OFFICE O/P EST MOD 30 MIN: CPT | Mod: 25 | Performed by: FAMILY MEDICINE

## 2024-06-19 RX ORDER — ATENOLOL 50 MG/1
50 TABLET ORAL DAILY
Qty: 90 TABLET | Refills: 1 | Status: SHIPPED | OUTPATIENT
Start: 2024-06-19

## 2024-06-19 RX ORDER — CITALOPRAM HYDROBROMIDE 40 MG/1
40 TABLET ORAL DAILY
Qty: 90 TABLET | Refills: 1 | Status: SHIPPED | OUTPATIENT
Start: 2024-06-19

## 2024-06-19 RX ORDER — FLUTICASONE FUROATE AND VILANTEROL 100; 25 UG/1; UG/1
1 POWDER RESPIRATORY (INHALATION) DAILY
Qty: 3 EACH | Refills: 3 | Status: SHIPPED | OUTPATIENT
Start: 2024-06-19

## 2024-06-19 RX ORDER — BENZONATATE 100 MG/1
100 CAPSULE ORAL 3 TIMES DAILY PRN
Qty: 20 CAPSULE | Refills: 0 | Status: SHIPPED | OUTPATIENT
Start: 2024-06-19

## 2024-06-19 RX ORDER — BUSPIRONE HYDROCHLORIDE 15 MG/1
7.5 TABLET ORAL 2 TIMES DAILY
Qty: 90 TABLET | Refills: 1 | Status: SHIPPED | OUTPATIENT
Start: 2024-06-19

## 2024-06-19 RX ORDER — HYDROCHLOROTHIAZIDE 25 MG/1
12.5 TABLET ORAL DAILY
Qty: 45 TABLET | Refills: 1 | Status: SHIPPED | OUTPATIENT
Start: 2024-06-19

## 2024-06-19 RX ORDER — POTASSIUM CHLORIDE 1500 MG/1
20 TABLET, EXTENDED RELEASE ORAL DAILY
Qty: 90 TABLET | Refills: 1 | Status: SHIPPED | OUTPATIENT
Start: 2024-06-19

## 2024-06-19 ASSESSMENT — ANXIETY QUESTIONNAIRES
2. NOT BEING ABLE TO STOP OR CONTROL WORRYING: NOT AT ALL
6. BECOMING EASILY ANNOYED OR IRRITABLE: NOT AT ALL
IF YOU CHECKED OFF ANY PROBLEMS ON THIS QUESTIONNAIRE, HOW DIFFICULT HAVE THESE PROBLEMS MADE IT FOR YOU TO DO YOUR WORK, TAKE CARE OF THINGS AT HOME, OR GET ALONG WITH OTHER PEOPLE: NOT DIFFICULT AT ALL
1. FEELING NERVOUS, ANXIOUS, OR ON EDGE: SEVERAL DAYS
5. BEING SO RESTLESS THAT IT IS HARD TO SIT STILL: NOT AT ALL
GAD7 TOTAL SCORE: 0
7. FEELING AFRAID AS IF SOMETHING AWFUL MIGHT HAPPEN: NOT AT ALL
2. NOT BEING ABLE TO STOP OR CONTROL WORRYING: NOT AT ALL
GAD7 TOTAL SCORE: 3
IF YOU CHECKED OFF ANY PROBLEMS ON THIS QUESTIONNAIRE, HOW DIFFICULT HAVE THESE PROBLEMS MADE IT FOR YOU TO DO YOUR WORK, TAKE CARE OF THINGS AT HOME, OR GET ALONG WITH OTHER PEOPLE: NOT DIFFICULT AT ALL
1. FEELING NERVOUS, ANXIOUS, OR ON EDGE: NOT AT ALL
3. WORRYING TOO MUCH ABOUT DIFFERENT THINGS: NOT AT ALL
5. BEING SO RESTLESS THAT IT IS HARD TO SIT STILL: NOT AT ALL
6. BECOMING EASILY ANNOYED OR IRRITABLE: NOT AT ALL
3. WORRYING TOO MUCH ABOUT DIFFERENT THINGS: SEVERAL DAYS
7. FEELING AFRAID AS IF SOMETHING AWFUL MIGHT HAPPEN: NOT AT ALL
GAD7 TOTAL SCORE: 3

## 2024-06-19 ASSESSMENT — PATIENT HEALTH QUESTIONNAIRE - PHQ9
SUM OF ALL RESPONSES TO PHQ QUESTIONS 1-9: 0
5. POOR APPETITE OR OVEREATING: NOT AT ALL
5. POOR APPETITE OR OVEREATING: SEVERAL DAYS

## 2024-06-19 NOTE — PROGRESS NOTES
Ely Pollack is a 74 year old female who presents for Medicare Annual Wellness Visit.    Current providers caring for this patient include:  Patient Care Team:  Noemi Kirkpatrick MD as PCP - General (Family Medicine)  Noemi Kirkpatrick MD as Assigned PCP    Complete Medical and Social history reviewed with patient, outlined below.    Patient Active Problem List   Diagnosis    History of bilateral knee arthroplasty    Essential hypertension    Anxiety    Chronic back pain, unspecified back location, unspecified back pain laterality    Bee sting allergy    Leg cramps    Chronic neck pain    Mild intermittent asthma without complication    Cervical radiculopathy at C7    Choroidal nevus, right eye    Degenerative arthritis of lumbar spine    Degenerative drusen, bilateral    Dermatochalasis of both upper eyelids    History of squamous cell carcinoma of skin    Major depressive disorder, recurrent episode, moderate (H)    Nuclear sclerotic cataract, bilateral    Presbyopia    Regular astigmatism, bilateral    Squamous cell carcinoma of lower leg, left    S/P revision of total knee, right    Class 2 severe obesity due to excess calories with serious comorbidity in adult (H)       Past Medical History:   Diagnosis Date    Arthritis     Hypertension     Cardiology No    Other chronic pain     Pain for 10 years    PONV (postoperative nausea and vomiting)     Uncomplicated asthma        Past Surgical History:   Procedure Laterality Date    ARTHROPLASTY KNEE BILATERAL Bilateral 3/29/2016    Procedure: ARTHROPLASTY KNEE BILATERAL;  Surgeon: Darryn Cortez MD;  Location: [facility] OR    ARTHROPLASTY REVISION KNEE Right 1/18/2023    Procedure: revision right total knee arthroplasty, total component revision for aseptic loosening;  Surgeon: Ben Lindsay MD;  Location: [facility] OR    AS DECOMPRESS SPINAL CORD,1 SEG      Thorasic    AS SPINAL FUSION,ANT,EA ADNL LEVEL      Thorasic    BUNIONECTOMY Left        Family History  "  Problem Relation Age of Onset    Bone Cancer Mother     Chronic Obstructive Pulmonary Disease Father     Heart Disease Brother     Kidney Disease Brother     Diabetes Type 2  Brother     Alcoholism Brother     Heart Disease Brother     Kidney Disease Brother     Alcoholism Brother     Diabetes Type 2  Brother     Colon Cancer Maternal Grandmother        Social History     Tobacco Use    Smoking status: Never     Passive exposure: Never    Smokeless tobacco: Never   Substance Use Topics    Alcohol use: Yes     Comment: 3 drinks weekly       Diet: regular, low salt/low fat, intermittent fasting  Physical Activity: patient exercises 5 times weekly, gym membership, walking  Depression Screen:    Over the past 2 weeks, patient has felt down, depressed, or hopeless:  No    Over the past 2 weeks, patient has felt little interest or pleasure in doing things: No    Functional ability/Safety screen:  Up and go test (able to get up and walk longer than 30 seconds): Passed, she uses a cane when traveling  Patient needs assistance with: nothing  Patient's home has the following possible safety concerns: none identified  Patient has concerns about her hearing:  No  Cognitive Screen  Patient repeats three objects (ball, flag, tree)      Clock drawing test:   NORMAL  Recalls three objects after 3 minutes (ball,flag,tree):      recalls 2 objects (2 points)    Physical Exam:  /72 (BP Location: Right arm, Patient Position: Sitting, Cuff Size: Adult Large)   Pulse 64   Temp 97.8  F (36.6  C) (Temporal)   Ht 1.638 m (5' 4.5\")   Wt 91.6 kg (202 lb)   SpO2 97%   BMI 34.14 kg/m     Body mass index is 34.14 kg/m .     Health Maintenance   Topic Date Due    COVID-19 Vaccine (4 - 2023-24 season) 09/01/2023    ASTHMA CONTROL TEST  12/05/2023    COLORECTAL CANCER SCREENING  02/19/2024    MEDICARE ANNUAL WELLNESS VISIT  03/07/2024    PHQ-9  03/29/2024    ASTHMA ACTION PLAN  06/05/2024    DTAP/TDAP/TD IMMUNIZATION (1 - Tdap) " 03/11/2025 (Originally 3/27/2013)    MAMMO SCREENING  04/18/2025    FALL RISK ASSESSMENT  06/19/2025    DEXA  02/19/2026    GLUCOSE  09/29/2026    ADVANCE CARE PLANNING  01/06/2028    LIPID  09/29/2028    HEPATITIS C SCREENING  Completed    DEPRESSION ACTION PLAN  Completed    INFLUENZA VACCINE  Completed    Pneumococcal Vaccine: 65+ Years  Completed    ZOSTER IMMUNIZATION  Completed    RSV VACCINE (Pregnancy & 60+)  Completed    IPV IMMUNIZATION  Aged Out    HPV IMMUNIZATION  Aged Out    MENINGITIS IMMUNIZATION  Aged Out    RSV MONOCLONAL ANTIBODY  Aged Out         End of Life Planning:   Patient currently has an advanced directive: she has at home and will bring in a copy    Education/Counseling:   Based on review of the above information, the following items were addressed:      Healthy diet and regular exercise.    Appropriate preventive services were discussed with this patient, including applicable screening as appropriate for cardiovascular disease, diabetes, osteopenia/osteoporosis, and glaucoma.  As appropriate for age/gender, discussed screening for colorectal cancer, prostate cancer, breast cancer, and cervical cancer.   Checklist reviewing preventive services available has been given to the patient.    Hm reviewed and is up to date

## 2024-06-19 NOTE — NURSING NOTE
Chief Complaint   Patient presents with    Recheck Medication     Refill medications, fasting today      Pre-visit Screening:  Immunizations:  up to date  Colonoscopy: ordered today  Mammogram: is up to date  Asthma Action Test/Plan:  NA  PHQ9:  Done today  GAD7:  Done today  Questioned patient about current smoking habits Pt. has never smoked.  Ok to leave detailed message on voice mail for today's visit only Yes, phone # [phone]

## 2024-06-19 NOTE — LETTER
My Asthma Action Plan    Name: Ely Pollack   YOB: 1950  Date: 6/19/2024   My doctor: Noemi Kirkpatrick MD   My clinic: [facility]        My Rescue Medicine:   Albuterol inhaler (Proair/Ventolin/Proventil HFA)  2-4 puffs EVERY 4 HOURS as needed. Use a spacer if recommended by your provider.   My Asthma Severity:   Intermittent / Exercise Induced  Know your asthma triggers:                GREEN ZONE   Good Control  I feel good  No cough or wheeze  Can work, sleep and play without asthma symptoms       Take your asthma control medicine every day.     If exercise triggers your asthma, take your rescue medication  15 minutes before exercise or sports, and  During exercise if you have asthma symptoms  Spacer to use with inhaler: If you have a spacer, make sure to use it with your inhaler             YELLOW ZONE Getting Worse  I have ANY of these:  I do not feel good  Cough or wheeze  Chest feels tight  Wake up at night   Keep taking your Green Zone medications  Start taking your rescue medicine:  every 20 minutes for up to 1 hour. Then every 4 hours for 24-48 hours.  If you stay in the Yellow Zone for more than 12-24 hours, contact your doctor.  If you do not return to the Green Zone in 12-24 hours or you get worse, start taking your oral steroid medicine if prescribed by your provider.           RED ZONE Medical Alert - Get Help  I have ANY of these:  I feel awful  Medicine is not helping  Breathing getting harder  Trouble walking or talking  Nose opens wide to breathe       Take your rescue medicine NOW  If your provider has prescribed an oral steroid medicine, start taking it NOW  Call your doctor NOW  If you are still in the Red Zone after 20 minutes and you have not reached your doctor:  Take your rescue medicine again and  Call 911 or go to the emergency room right away    See your regular doctor within 2 weeks of an Emergency Room or Urgent Care visit for follow-up treatment.           Annual Reminders:  Meet with Asthma Educator,  Flu Shot in the Fall, consider Pneumonia Vaccination for patients with asthma (aged 19 and older).    Pharmacy: [facility] [id number] [place] [facility] [address] [address]    Electronically signed by Noemi Kirkpatrick MD   Date: 06/19/24                    Asthma Triggers  How To Control Things That Make Your Asthma Worse    Triggers are things that make your asthma worse.  Look at the list below to help you find your triggers and   what you can do about them. You can help prevent asthma flare-ups by staying away from your triggers.      Trigger                                                          What you can do   Cigarette Smoke  Tobacco smoke can make asthma worse. Do not allow smoking in your home, car or around you.  Be sure no one smokes at a child s day care or school.  If you smoke, ask your health care provider for ways to help you quit.  Ask family members to quit too.  Ask your health care provider for a referral to Quit Plan to help you quit smoking, or call 9-698-699-PLAN.     Colds, Flu, Bronchitis  These are common triggers of asthma. Wash your hands often.  Don t touch your eyes, nose or mouth.  Get a flu shot every year.     Dust Mites  These are tiny bugs that live in cloth or carpet. They are too small to see. Wash sheets and blankets in hot water every week.   Encase pillows and mattress in dust mite proof covers.  Avoid having carpet if you can. If you have carpet, vacuum weekly.   Use a dust mask and HEPA vacuum.   Pollen and Outdoor Mold  Some people are allergic to trees, grass, or weed pollen, or molds. Try to keep your windows closed.  Limit time out doors when pollen count is high.   Ask you health care provider about taking medicine during allergy season.     Animal Dander  Some people are allergic to skin flakes, urine or saliva from pets with fur or feathers. Keep pets with fur or feathers out of your home.    If you can t keep the  pet outdoors, then keep the pet out of your bedroom.  Keep the bedroom door closed.  Keep pets off cloth furniture and away from stuffed toys.     Mice, Rats, and Cockroaches  Some people are allergic to the waste from these pests.   Cover food and garbage.  Clean up spills and food crumbs.  Store grease in the refrigerator.   Keep food out of the bedroom.   Indoor Mold  This can be a trigger if your home has high moisture. Fix leaking faucets, pipes, or other sources of water.   Clean moldy surfaces.  Dehumidify basement if it is damp and smelly.   Smoke, Strong Odors, and Sprays  These can reduce air quality. Stay away from strong odors and sprays, such as perfume, powder, hair spray, paints, smoke incense, paint, cleaning products, candles and new carpet.   Exercise or Sports  Some people with asthma have this trigger. Be active!  Ask your doctor about taking medicine before sports or exercise to prevent symptoms.    Warm up for 5-10 minutes before and after sports or exercise.     Other Triggers of Asthma  Cold air:  Cover your nose and mouth with a scarf.  Sometimes laughing or crying can be a trigger.  Some medicines and food can trigger asthma.

## 2024-06-30 LAB — NONINV COLON CA DNA+OCC BLD SCRN STL QL: NEGATIVE

## 2024-08-19 ENCOUNTER — OFFICE VISIT (OUTPATIENT)
Dept: FAMILY MEDICINE | Facility: CLINIC | Age: 74
End: 2024-08-19

## 2024-08-19 VITALS
WEIGHT: 202 LBS | TEMPERATURE: 97.8 F | HEART RATE: 52 BPM | SYSTOLIC BLOOD PRESSURE: 124 MMHG | OXYGEN SATURATION: 97 % | DIASTOLIC BLOOD PRESSURE: 78 MMHG | BODY MASS INDEX: 34.14 KG/M2

## 2024-08-19 DIAGNOSIS — K11.20 PAROTITIS: Primary | ICD-10-CM

## 2024-08-19 DIAGNOSIS — B00.1 COLD SORE: ICD-10-CM

## 2024-08-19 PROCEDURE — G2211 COMPLEX E/M VISIT ADD ON: HCPCS | Performed by: FAMILY MEDICINE

## 2024-08-19 PROCEDURE — 99214 OFFICE O/P EST MOD 30 MIN: CPT | Performed by: FAMILY MEDICINE

## 2024-08-19 RX ORDER — CLINDAMYCIN HCL 300 MG
300 CAPSULE ORAL 3 TIMES DAILY
Qty: 30 CAPSULE | Refills: 0 | Status: SHIPPED | OUTPATIENT
Start: 2024-08-19 | End: 2024-08-29

## 2024-08-19 RX ORDER — VALACYCLOVIR HYDROCHLORIDE 1 G/1
2000 TABLET, FILM COATED ORAL 2 TIMES DAILY
Qty: 4 TABLET | Refills: 2 | Status: SHIPPED | OUTPATIENT
Start: 2024-08-19 | End: 2024-08-20

## 2024-08-19 NOTE — PROGRESS NOTES
"Assessment & Plan   Problem List Items Addressed This Visit    None  Visit Diagnoses       Parotitis    -  Primary    Relevant Medications    clindamycin (CLEOCIN) 300 MG capsule    Other Relevant Orders    Adult ENT  Referral    Cold sore        Relevant Medications    valACYclovir (VALTREX) 1000 mg tablet           1. Parotitis  Parotid gland swelling, unclear if this is due to a stone or infection or reaction to her cold sore. Warm packs, fluids, oral antibiotics, if worsening, recheck urgently. Otherwise referral done to ENT.  - Adult ENT  Referral  - clindamycin (CLEOCIN) 300 MG capsule; Take 1 capsule (300 mg) by mouth 3 times daily for 10 days  Dispense: 30 capsule; Refill: 0    2. Cold sore  Valtrex.  - valACYclovir (VALTREX) 1000 mg tablet; Take 2 tablets (2,000 mg) by mouth 2 times daily for 1 day  Dispense: 4 tablet; Refill: 2            BMI  Estimated body mass index is 34.14 kg/m  as calculated from the following:    Height as of 6/19/24: 1.638 m (5' 4.5\").    Weight as of this encounter: 91.6 kg (202 lb).         FUTURE APPOINTMENTS:       - Follow-up visit as needed. We manage her chronic medical care.    No follow-ups on file.    Noemi Kirkpatrick MD  [facility]    Subjective     Nursing Notes:   China Vasques CMA  8/19/2024 12:53 PM  Signed  Chief Complaint   Patient presents with    Swelling     Swelling on right side of face near her ear and around the backside of her ear, she noticed this start last week, warmth to that area, she is feeling \"lumps\" under her skin     Pre-visit Screening:  Immunizations:  not up to date - tdap at pharmacy  Colonoscopy:  is up to date  Mammogram: is up to date  Asthma Action Test/Plan:  NA  PHQ9:  NA  GAD7:  NA  Questioned patient about current smoking habits Pt. has never smoked.  Ok to leave detailed message on voice mail for today's visit only Yes, phone # [phone]       Ely Pollack is a 74 year old female who " presents to clinic today for the following health issues   HPI     Lumps in the right neck Thursday around the ear, a cold sore started on upper lip around the same time. The lumps hurt. No sore throat, no ear pain. Anterior to the ear. No pain with eating.        Review of Systems   Constitutional, HEENT, cardiovascular, pulmonary, gi and gu systems are negative, except as otherwise noted.      Objective    /78 (BP Location: Right arm, Patient Position: Sitting, Cuff Size: Adult Large)   Pulse 52   Temp 97.8  F (36.6  C) (Temporal)   Wt 91.6 kg (202 lb)   SpO2 97%   BMI 34.14 kg/m    Body mass index is 34.14 kg/m .  Physical Exam   GENERAL: alert and no distress  EYES: Eyes grossly normal to inspection, PERRL and conjunctivae and sclerae normal  HENT: ear canals and TM's normal, nose and mouth without ulcers or lesions, but ulcerated lesion/cold sore right upper lip/face area.  Tenderness and swelling anterior to right ear.  MS: no gross musculoskeletal defects noted, no edema  NEURO: Normal strength and tone, mentation intact and speech normal  PSYCH: mentation appears normal, affect normal/bright

## 2024-08-19 NOTE — NURSING NOTE
"Chief Complaint   Patient presents with    Swelling     Swelling on right side of face near her ear and around the backside of her ear, she noticed this start last week, warmth to that area, she is feeling \"lumps\" under her skin     Pre-visit Screening:  Immunizations:  not up to date - tdap at pharmacy  Colonoscopy:  is up to date  Mammogram: is up to date  Asthma Action Test/Plan:  NA  PHQ9:  NA  GAD7:  NA  Questioned patient about current smoking habits Pt. has never smoked.  Ok to leave detailed message on voice mail for today's visit only Yes, phone # [phone]    "

## 2024-09-20 ENCOUNTER — TRANSFERRED RECORDS (OUTPATIENT)
Dept: FAMILY MEDICINE | Facility: CLINIC | Age: 74
End: 2024-09-20

## 2024-10-12 DIAGNOSIS — I10 ESSENTIAL HYPERTENSION: ICD-10-CM

## 2024-10-12 DIAGNOSIS — K11.20 PAROTITIS: ICD-10-CM

## 2024-10-12 DIAGNOSIS — F32.89 OTHER DEPRESSION: ICD-10-CM

## 2024-10-12 DIAGNOSIS — F41.9 ANXIETY: ICD-10-CM

## 2024-10-14 RX ORDER — POTASSIUM CHLORIDE 1500 MG/1
20 TABLET, EXTENDED RELEASE ORAL DAILY
COMMUNITY
Start: 2024-10-14

## 2024-10-14 RX ORDER — CITALOPRAM HYDROBROMIDE 40 MG/1
40 TABLET ORAL DAILY
COMMUNITY
Start: 2024-10-14

## 2024-10-14 RX ORDER — ATENOLOL 50 MG/1
50 TABLET ORAL DAILY
COMMUNITY
Start: 2024-10-14

## 2024-10-14 RX ORDER — BUSPIRONE HYDROCHLORIDE 15 MG/1
7.5 TABLET ORAL 2 TIMES DAILY
COMMUNITY
Start: 2024-10-14

## 2024-10-14 RX ORDER — CLINDAMYCIN HYDROCHLORIDE 300 MG/1
CAPSULE ORAL
COMMUNITY
Start: 2024-10-14

## 2024-10-14 NOTE — TELEPHONE ENCOUNTER
Ely Pollack is requesting a refill of:    Refused Prescriptions:                       Disp   Refills    citalopram (CELEXA) 40 MG tablet [Pharmacy*                Sig: TAKE 1 TABLET BY MOUTH EVERY DAY  Refused By: HECTOR QUIÑONEZ  Reason for Refusal: Patient has requested refill too soon    KLOR-CON M20 20 MEQ CR tablet [Pharmacy Me*                Sig: TAKE 1 TABLET BY MOUTH DAILY  Refused By: HECTOR QUIÑONEZ  Reason for Refusal: Patient has requested refill too soon    atenolol (TENORMIN) 50 MG tablet [Pharmacy*                Sig: TAKE 1 TABLET BY MOUTH EVERY DAY  Refused By: HECTOR QUIÑONEZ  Reason for Refusal: Patient has requested refill too soon    busPIRone (BUSPAR) 15 MG tablet [Pharmacy *                Sig: TAKE 0.5 TABLETS (7.5 MG) BY MOUTH 2 TIMES DAILY  Refused By: HECTOR QUIÑONEZ  Reason for Refusal: Patient has requested refill too soon    clindamycin (CLEOCIN) 300 MG capsule [Phar*                Sig: TAKE 1 CAPSULE BY MOUTH 3 TIMES DAILY FOR 10 DAYS  Refused By: HECTOR QUIÑONEZ  Reason for Refusal: Patient has requested refill too soon    Refills sent on 06/19/24 for 6 months of medications

## 2024-11-22 NOTE — PROGRESS NOTES
Assessment & Plan   Problem List Items Addressed This Visit       Essential hypertension    Relevant Medications    hydrochlorothiazide (HYDRODIURIL) 25 MG tablet    KLOR-CON M20 20 MEQ CR tablet    atenolol (TENORMIN) 50 MG tablet    Other Relevant Orders    VENOUS COLLECTION (Completed)    Lipid Panel (BFP)    Comprehensive Metobolic Panel (BFP)    Anxiety    Relevant Medications    busPIRone (BUSPAR) 15 MG tablet    citalopram (CELEXA) 40 MG tablet    Mild intermittent asthma without complication    Relevant Medications    albuterol (PROAIR HFA/PROVENTIL HFA/VENTOLIN HFA) 108 (90 Base) MCG/ACT inhaler    fluticasone-vilanterol (BREO ELLIPTA) 100-25 MCG/ACT inhaler    Other Relevant Orders    Asthma Action Plan (AAP) (Completed)     Other Visit Diagnoses       Intermittent asthma, unspecified asthma severity, unspecified whether complicated        Relevant Medications    albuterol (PROAIR HFA/PROVENTIL HFA/VENTOLIN HFA) 108 (90 Base) MCG/ACT inhaler    fluticasone-vilanterol (BREO ELLIPTA) 100-25 MCG/ACT inhaler    Other depression        Relevant Medications    busPIRone (BUSPAR) 15 MG tablet    citalopram (CELEXA) 40 MG tablet    Cold sore        Acute cough        Relevant Medications    albuterol (PROAIR HFA/PROVENTIL HFA/VENTOLIN HFA) 108 (90 Base) MCG/ACT inhaler    fluticasone-vilanterol (BREO ELLIPTA) 100-25 MCG/ACT inhaler    benzonatate (TESSALON) 100 MG capsule           1. Intermittent asthma, unspecified asthma severity, unspecified whether complicated  Refilled.  - albuterol (PROAIR HFA/PROVENTIL HFA/VENTOLIN HFA) 108 (90 Base) MCG/ACT inhaler; Inhale 2 puffs into the lungs every 6 hours as needed for shortness of breath or wheezing.  Dispense: 54 g; Refill: 0    2. Essential hypertension  Controlled on medications. Check labs. Refilled.  - hydrochlorothiazide (HYDRODIURIL) 25 MG tablet; Take 0.5 tablets (12.5 mg) by mouth daily.  Dispense: 45 tablet; Refill: 1  - KLOR-CON M20 20 MEQ CR tablet; Take  "1 tablet (20 mEq) by mouth daily.  Dispense: 90 tablet; Refill: 1  - atenolol (TENORMIN) 50 MG tablet; Take 1 tablet (50 mg) by mouth daily.  Dispense: 90 tablet; Refill: 1  - VENOUS COLLECTION  - Lipid Panel (BFP)  - Comprehensive Metobolic Panel (BFP)    3. Other depression  Controlled, refilled.  - busPIRone (BUSPAR) 15 MG tablet; Take 0.5 tablets (7.5 mg) by mouth 2 times daily.  Dispense: 90 tablet; Refill: 1  - citalopram (CELEXA) 40 MG tablet; Take 1 tablet (40 mg) by mouth daily.  Dispense: 90 tablet; Refill: 1    4. Anxiety  Symptoms are controlled, refilled.  - busPIRone (BUSPAR) 15 MG tablet; Take 0.5 tablets (7.5 mg) by mouth 2 times daily.  Dispense: 90 tablet; Refill: 1  - citalopram (CELEXA) 40 MG tablet; Take 1 tablet (40 mg) by mouth daily.  Dispense: 90 tablet; Refill: 1    5. Mild intermittent asthma without complication  Refilled.  - fluticasone-vilanterol (BREO ELLIPTA) 100-25 MCG/ACT inhaler; Inhale 1 puff into the lungs daily.  Dispense: 180 each; Refill: 1  - Asthma Action Plan (AAP)    6. Cold sore      7. Acute cough  refilled  - benzonatate (TESSALON) 100 MG capsule; Take 1 capsule (100 mg) by mouth 3 times daily as needed.  Dispense: 20 capsule; Refill: 0            BMI  Estimated body mass index is 33.97 kg/m  as calculated from the following:    Height as of 6/19/24: 1.638 m (5' 4.5\").    Weight as of this encounter: 91.2 kg (201 lb).         FUTURE APPOINTMENTS:       - Follow-up visit in 6 months. We manage her chronic medical care.    No follow-ups on file.    Noemi Kirkpatrick MD  [facility]    Subjective     Nursing Notes:   China Vasques CMA  12/4/2024 10:32 AM  Signed  Chief Complaint   Patient presents with    Recheck Medication     Fasting today, refill medications     Pre-visit Screening:  Immunizations:  up to date  Colonoscopy:  is up to date  Mammogram: is up to date  Asthma Action Test/Plan:  Done today  PHQ9:  Done today  GAD7:  Done today  Questioned " patient about current smoking habits Pt. has never smoked.  Ok to leave detailed message on voice mail for today's visit only Yes, phone # [phone]       Ely Pollack is a 74 year old female who presents to clinic today for the following health issues   HPI     Here to followup on medications. She is doing well.  Hypertension: is doing well on medications.   Depression and anxiety: controlled. Albuterol used as needed. Inhalers are ok.  Wants a small refill on tessalon for occ cough.  Will be going on a cruise around [place] and [place]        Review of Systems   Constitutional, HEENT, cardiovascular, pulmonary, gi and gu systems are negative, except as otherwise noted.      Objective    /78 (BP Location: Right arm, Patient Position: Sitting, Cuff Size: Adult Large)   Pulse 60   Temp 97.8  F (36.6  C) (Temporal)   Wt 91.2 kg (201 lb)   SpO2 97%   BMI 33.97 kg/m    Body mass index is 33.97 kg/m .  Physical Exam   GENERAL: alert and no distress  RESP: lungs clear to auscultation - no rales, rhonchi or wheezes  CV: regular rate and rhythm, normal S1 S2, no S3 or S4, no murmur, click or rub, no peripheral edema  MS: no gross musculoskeletal defects noted, no edema  PSYCH: mentation appears normal, affect normal/bright    No results found for any visits on 12/04/24.

## 2024-11-26 ENCOUNTER — HOSPITAL ENCOUNTER (EMERGENCY)
Facility: CLINIC | Age: 74
Discharge: HOME | End: 2024-11-26
Attending: EMERGENCY MEDICINE | Admitting: EMERGENCY MEDICINE
Payer: COMMERCIAL

## 2024-11-26 ENCOUNTER — APPOINTMENT (OUTPATIENT)
Dept: MRI IMAGING | Facility: CLINIC | Age: 74
End: 2024-11-26
Attending: NURSE PRACTITIONER
Payer: COMMERCIAL

## 2024-11-26 VITALS
SYSTOLIC BLOOD PRESSURE: 168 MMHG | TEMPERATURE: 97 F | OXYGEN SATURATION: 98 % | HEART RATE: 51 BPM | DIASTOLIC BLOOD PRESSURE: 77 MMHG | RESPIRATION RATE: 18 BRPM

## 2024-11-26 DIAGNOSIS — H53.9 VISUAL DISTURBANCE: ICD-10-CM

## 2024-11-26 LAB
ANION GAP SERPL CALCULATED.3IONS-SCNC: 11 MMOL/L (ref 7–15)
ATRIAL RATE - MUSE: 62 BPM
BASOPHILS # BLD AUTO: 0.1 10E3/UL (ref 0–0.2)
BASOPHILS NFR BLD AUTO: 1 %
BUN SERPL-MCNC: 14.8 MG/DL (ref 8–23)
CALCIUM SERPL-MCNC: 9.5 MG/DL (ref 8.8–10.4)
CHLORIDE SERPL-SCNC: 98 MMOL/L (ref 98–107)
CREAT SERPL-MCNC: 0.63 MG/DL (ref 0.51–0.95)
DIASTOLIC BLOOD PRESSURE - MUSE: NORMAL MMHG
EGFRCR SERPLBLD CKD-EPI 2021: >90 ML/MIN/1.73M2
EOSINOPHIL # BLD AUTO: 0.1 10E3/UL (ref 0–0.7)
EOSINOPHIL NFR BLD AUTO: 2 %
ERYTHROCYTE [DISTWIDTH] IN BLOOD BY AUTOMATED COUNT: 13.2 % (ref 10–15)
GLUCOSE SERPL-MCNC: 105 MG/DL (ref 70–99)
HCO3 SERPL-SCNC: 27 MMOL/L (ref 22–29)
HCT VFR BLD AUTO: 44.2 % (ref 35–47)
HGB BLD-MCNC: 14.7 G/DL (ref 11.7–15.7)
HOLD SPECIMEN: NORMAL
HOLD SPECIMEN: NORMAL
IMM GRANULOCYTES # BLD: 0 10E3/UL
IMM GRANULOCYTES NFR BLD: 0 %
INTERPRETATION ECG - MUSE: NORMAL
LYMPHOCYTES # BLD AUTO: 1.2 10E3/UL (ref 0.8–5.3)
LYMPHOCYTES NFR BLD AUTO: 19 %
MCH RBC QN AUTO: 29.3 PG (ref 26.5–33)
MCHC RBC AUTO-ENTMCNC: 33.3 G/DL (ref 31.5–36.5)
MCV RBC AUTO: 88 FL (ref 78–100)
MONOCYTES # BLD AUTO: 0.4 10E3/UL (ref 0–1.3)
MONOCYTES NFR BLD AUTO: 6 %
NEUTROPHILS # BLD AUTO: 4.6 10E3/UL (ref 1.6–8.3)
NEUTROPHILS NFR BLD AUTO: 72 %
NRBC # BLD AUTO: 0 10E3/UL
NRBC BLD AUTO-RTO: 0 /100
P AXIS - MUSE: 83 DEGREES
PLATELET # BLD AUTO: 228 10E3/UL (ref 150–450)
POTASSIUM SERPL-SCNC: 4 MMOL/L (ref 3.4–5.3)
PR INTERVAL - MUSE: 242 MS
QRS DURATION - MUSE: 90 MS
QT - MUSE: 432 MS
QTC - MUSE: 438 MS
R AXIS - MUSE: 46 DEGREES
RBC # BLD AUTO: 5.01 10E6/UL (ref 3.8–5.2)
SODIUM SERPL-SCNC: 136 MMOL/L (ref 135–145)
SYSTOLIC BLOOD PRESSURE - MUSE: NORMAL MMHG
T AXIS - MUSE: 64 DEGREES
TROPONIN T SERPL HS-MCNC: 9 NG/L
TROPONIN T SERPL HS-MCNC: 9 NG/L
VENTRICULAR RATE- MUSE: 62 BPM
WBC # BLD AUTO: 6.4 10E3/UL (ref 4–11)

## 2024-11-26 PROCEDURE — 84484 ASSAY OF TROPONIN QUANT: CPT | Performed by: EMERGENCY MEDICINE

## 2024-11-26 PROCEDURE — 255N000002 HC RX 255 OP 636: Performed by: NURSE PRACTITIONER

## 2024-11-26 PROCEDURE — 36415 COLL VENOUS BLD VENIPUNCTURE: CPT | Performed by: EMERGENCY MEDICINE

## 2024-11-26 PROCEDURE — 80048 BASIC METABOLIC PNL TOTAL CA: CPT | Performed by: EMERGENCY MEDICINE

## 2024-11-26 PROCEDURE — 85004 AUTOMATED DIFF WBC COUNT: CPT | Performed by: EMERGENCY MEDICINE

## 2024-11-26 PROCEDURE — 70553 MRI BRAIN STEM W/O & W/DYE: CPT

## 2024-11-26 PROCEDURE — 82565 ASSAY OF CREATININE: CPT | Performed by: EMERGENCY MEDICINE

## 2024-11-26 PROCEDURE — 99285 EMERGENCY DEPT VISIT HI MDM: CPT | Mod: 25

## 2024-11-26 PROCEDURE — A9585 GADOBUTROL INJECTION: HCPCS | Performed by: NURSE PRACTITIONER

## 2024-11-26 PROCEDURE — 250N000013 HC RX MED GY IP 250 OP 250 PS 637: Performed by: EMERGENCY MEDICINE

## 2024-11-26 RX ORDER — GADOBUTROL 604.72 MG/ML
9 INJECTION INTRAVENOUS ONCE
Status: DISCONTINUED | OUTPATIENT
Start: 2024-11-26 | End: 2024-11-26 | Stop reason: HOSPADM

## 2024-11-26 RX ORDER — TETRACAINE HYDROCHLORIDE 5 MG/ML
2 SOLUTION OPHTHALMIC ONCE
Status: COMPLETED | OUTPATIENT
Start: 2024-11-26 | End: 2024-11-26

## 2024-11-26 RX ORDER — ACETAMINOPHEN 500 MG
1000 TABLET ORAL ONCE
Status: COMPLETED | OUTPATIENT
Start: 2024-11-26 | End: 2024-11-26

## 2024-11-26 RX ORDER — GADOBUTROL 604.72 MG/ML
9 INJECTION INTRAVENOUS ONCE
Status: COMPLETED | OUTPATIENT
Start: 2024-11-26 | End: 2024-11-26

## 2024-11-26 RX ORDER — DIAZEPAM 2 MG/1
2 TABLET ORAL ONCE
Status: COMPLETED | OUTPATIENT
Start: 2024-11-26 | End: 2024-11-26

## 2024-11-26 RX ADMIN — GADOBUTROL 9 ML: 604.72 INJECTION INTRAVENOUS at 13:44

## 2024-11-26 RX ADMIN — DIAZEPAM 2 MG: 2 TABLET ORAL at 13:23

## 2024-11-26 RX ADMIN — ACETAMINOPHEN 1000 MG: 500 TABLET, FILM COATED ORAL at 13:22

## 2024-11-26 ASSESSMENT — ACTIVITIES OF DAILY LIVING (ADL)
ADLS_ACUITY_SCORE: 53

## 2024-11-26 ASSESSMENT — COLUMBIA-SUICIDE SEVERITY RATING SCALE - C-SSRS
6. HAVE YOU EVER DONE ANYTHING, STARTED TO DO ANYTHING, OR PREPARED TO DO ANYTHING TO END YOUR LIFE?: NO
1. IN THE PAST MONTH, HAVE YOU WISHED YOU WERE DEAD OR WISHED YOU COULD GO TO SLEEP AND NOT WAKE UP?: NO
2. HAVE YOU ACTUALLY HAD ANY THOUGHTS OF KILLING YOURSELF IN THE PAST MONTH?: NO

## 2024-11-26 ASSESSMENT — VISUAL ACUITY
OS: 20/60;WITH CORRECTIVE LENSES
OD: 20/40;WITH CORRECTIVE LENSES

## 2024-11-26 NOTE — ED TRIAGE NOTES
Arrives ambulatory from the community. States last week was seen by the eye doctor, states was seen due to floaters in her eyes.   States yesterday those floaters became more circular and defined. Also reports eye pressure, and pressure on the left side.     Denies fevers at home.

## 2024-11-26 NOTE — CONSULTS
"[facility]    Stroke Telephone Note    I was called by Belia Hartman on 11/26/24 regarding patient Ely Pollack. The patient is a 74 year old female with PMHx significant for HTN, cervical radiculopathy. Per chart review, she is on aspirin 81 mg PTA. She presented to the ED today with left eye pressure and blurry vision/floaters. Symptoms started one week ago. She was seen in an eye clinic on 11/20 and was found to have left eye vitreous syneresis and cataracts. She was to follow up in 4-6 weeks. Since that time, blurred vision has gotten worse and she is now feeling pressure behind her eye and left sided facial heaviness. She also notes acute on chronic gait ataxia.     Vitals  BP: (!) 183/80   Pulse: 55   Resp: 18   Temp: 97  F (36.1  C)        Imaging Findings  Pending     Impression  Worsening left eye floaters/blurry vision  New feeling of pressure behind the left eye and left sided facial \"heaviness\"  Acute on chronic gait ataxia     Recommendations  - continue PTA aspirin  - MRI brain and orbits w/ and w/out contrast, ordered  - further recommendations pending MRI result    Case discussed with vascular neurology attending Dr. Ge.    My recommendations are based on the information provided over the phone by Ely Pollack's in-person providers. They are not intended to replace the clinical judgment of her in-person providers. I was not requested to personally see or examine the patient at this time.     Yoanna Morales NP  Vascular Neurology    To page me or covering stroke neurology team member, click here: [facility]COM  Choose \"On Call\" tab at top, then select \"NEUROLOGY/ALL SITES\" from middle drop-down box, press Enter, then look for \"stroke\" or \"telestroke\" for your site.   "

## 2024-11-26 NOTE — ED PROVIDER NOTES
"  Emergency Department Note      History of Present Illness     Chief Complaint   Eye Problem      HPI   Ely Pollack is a 74 year old female with history of hypertension, choroidal nevus, vitreous syneresis, and cataract who presents to the ED for a left eye problem. Patient was seen by optometry 6 days ago for floaters with no acute findings. Evan reports her doctor said this might be due to a lack of fluid in the left eyes, right eye is fine. She was prescribed eye drops but this caused burning in her eyes and made the floaters worse. She bought OTC systane eye drops which she has been using. Yesterday mid-morning, Evan noticed a solid black [place] in her left visual field that has become \"thicker\" and \"darker.\" Reports associated blurriness and vertical diplopia in the left eye. She has been getting intermittent mild headaches. Patient endorses left sided facial \"heaviness\" and her left eye feels \"thick.\" She has been using sunglasses for photophobia as this exacerbates symptoms. Evan states she has had gait ataxia chronically but this has worsened in the past month and become more prominent. Describes this as \"feeling out of alignment.\" No flashers, chest pain, shortness of breath, or history of ocular migraine. Denies numbness, weakness, or tingling in the extremities. No speech changes, per family member present.    Independent Historian   None    Review of External Notes   I reviewed Rubina Allen's 11/20/24 Optometry Note regarding eye problem. No holes, tears, or detachments.     Past Medical History     Medical History and Problem List   Hypertension  Asthma  Anxiety  Chronic pain  Cervical radiculopathy at C7  Choroidal nevus, right eye  Degenerative arthritis of lumbar spine  Degenerative drusen, bilateral  Dermatochalasis of both upper eyelids  Squamous cell carcinoma of skin  Major depressive disorder  Nuclear sclerotic cataract, bilateral  Presbyopia  Regular astigmatism, bilateral  Obesity "   Visual disturbance  Vitreous syneresis of left eye   Hypokalemia   Degenerative drusen     Medications   Albuterol  Aspirin 81 mg  Atenolol  Benzonatate  Breo ellipta  Buspirone  Citalopram  Hydrochlorothiazide  Klor-con  Melatonin  Omeprazole  Valacyclovir     Surgical History   Bilateral total knee arthroplasty  Revision right total knee arthroplasty  Thoracic decompression spinal cord, 1 seg  Spinal fusion, thoracic   Left bunionectomy   MOHS surgery  Skin biopsy  Menisectomy, right   Right foot bone spur removed  Blepharoplasty     Physical Exam     Patient Vitals for the past 24 hrs:   BP Temp Temp src Pulse Resp SpO2   11/26/24 1315 (!) 168/77 -- -- 51 -- 98 %   11/26/24 1304 (!) 183/80 -- -- 55 -- 99 %   11/26/24 1245 (!) 206/91 -- -- 59 -- 98 %   11/26/24 1239 (!) 199/88 -- -- 56 -- 95 %   11/26/24 1117 (!) 199/88 97  F (36.1  C) Temporal 62 18 98 %     Physical Exam  General: Resting on the bed.  Head: No obvious trauma to head.  Ears, Nose, Throat:  External ears normal.  Nose normal.    Eyes:  Conjunctivae clear.  Pupils are equal, round, and reactive.  EOMI.    Neck: Normal range of motion.  Neck supple.  No nuchal rigidity.    CV: Regular rate and rhythm.  No murmurs.      Respiratory: Effort normal and breath sounds normal.  No wheezing or crackles.   Gastrointestinal: Soft.  No distension. There is no tenderness.  There is no rigidity, no rebound and no guarding.   Musculoskeletal: Normal range of motion.  Non tender extremities to palpations.    Neuro: Alert. Moving all extremities appropriately.  Normal speech.  CN II-XII grossly intact, no pronator drift, normal finger-nose-finger with subtle past pointing on the left, visual fields intact.  Gross muscle strength intact of the proximal and distal bilateral upper and lower extremities.  Sensation intact to light touch in all 4 extremities.  Normal gait.  Negative romberg.    Skin: Skin is warm and dry.  No rash noted.     Diagnostics     Lab  Results   Labs Ordered and Resulted from Time of ED Arrival to Time of ED Departure   BASIC METABOLIC PANEL - Abnormal       Result Value    Sodium 136      Potassium 4.0      Chloride 98      Carbon Dioxide (CO2) 27      Anion Gap 11      Urea Nitrogen 14.8      Creatinine 0.63      GFR Estimate >90      Calcium 9.5      Glucose 105 (*)    TROPONIN T, HIGH SENSITIVITY - Normal    Troponin T, High Sensitivity 9     TROPONIN T, HIGH SENSITIVITY - Normal    Troponin T, High Sensitivity 9     CBC WITH PLATELETS AND DIFFERENTIAL    WBC Count 6.4      RBC Count 5.01      Hemoglobin 14.7      Hematocrit 44.2      MCV 88      MCH 29.3      MCHC 33.3      RDW 13.2      Platelet Count 228      % Neutrophils 72      % Lymphocytes 19      % Monocytes 6      % Eosinophils 2      % Basophils 1      % Immature Granulocytes 0      NRBCs per 100 WBC 0      Absolute Neutrophils 4.6      Absolute Lymphocytes 1.2      Absolute Monocytes 0.4      Absolute Eosinophils 0.1      Absolute Basophils 0.1      Absolute Immature Granulocytes 0.0      Absolute NRBCs 0.0         Imaging   MR Brain and Orbits w/o & w Contrast   Final Result   IMPRESSION:    1. Unremarkable MRI of the orbits.   2. No acute infarct, mass, hemorrhage, or herniation.   3. Mild diffuse parenchymal volume loss and white matter changes which   are most likely due to chronic microvascular ischemic disease.      HANNAH MULLEN MD            SYSTEM ID:  [id number]      POC US SOFT TISSUE   Final Result   Ocular ultrasound performed.   No visualized detachment or irregularity to the posterior chamber          EKG   ECG taken at 1252, ECG read at 1252  Sinus rhythm with 1st degree AV block  Otherwise normal ECG    No significant change as compared to prior, dated 12/9/22.  Rate 62 bpm. MN interval 242 ms. QRS duration 90 ms. QT/QTc 432/438 ms. P-R-T axes 83 46 64.    Independent Interpretation   None    ED Course      Medications Administered   Medications   gadobutrol  (GADAVIST) injection 9 mL ( Intravenous Canceled Entry 11/26/24 1332)   tetracaine (PONTOCAINE) 0.5 % ophthalmic solution 2 drop (2 drops Both Eyes Not Given 11/26/24 1336)   fluorescein (FUL-DILLAN) ophthalmic strip 1 strip (1 strip Both Eyes Not Given 11/26/24 1336)   gadobutrol (GADAVIST) injection 9 mL (9 mLs Intravenous $Given 11/26/24 1344)   acetaminophen (TYLENOL) tablet 1,000 mg (1,000 mg Oral $Given 11/26/24 1322)   diazepam (VALIUM) tablet 2 mg (2 mg Oral $Given 11/26/24 1323)       Procedures   Procedures     Discussion of Management   Neurology, see below    ED Course   ED Course as of 11/26/24 1847 Tue Nov 26, 2024   1225 I obtained history and examined the patient as noted above.    1234 Tonometry: 18 in the left, 19 in the right    1239 I spoke with Yoanna Morales, Stroke Neurology, regarding the patient's history and presentation in the emergency department today. Recommended MRI.     1806 I reassessed the patient and explained results         Additional Documentation  None    Medical Decision Making / Diagnosis     CMS Diagnoses: None    MIPS       None    MDM   Ely Pollack is a 74 year old female who presents emergency department for vision changes and disturbance.  Vital signs are reassuring, initially hypertensive but did improve over the course her stay.  Broad differential was considered including not limited to stroke, intracranial hemorrhage, tumor, electrolyte, metabolic, renal abnormality, ACS arrhythmia, hypertensive urgency or emergency, retinal detachment, floaters, etc.  CBC without leukocytosis or anemia.  BMP without acute electrolyte, metabolic or renal dysfunction.  EKG showing sinus rhythm.  Troponin x 2 within normal limits.  Patient has no chest pain or short of breath.  I do not suspect ACS or arrhythmia.  Consulted with stroke neurology recommended MRI MR orbits.  Fortunately this was negative for acute intracranial hemorrhage, tumor, inflammation of the optic nerves etc.   With reassuring MRI stroke does not have any further recommendations but rather follow-up with ophthalmology.  Quick bedside ultrasound does not show any obvious retinal detachments.  I discussed with patient possibility of transfer to the [facility] for a dilated eye exam.  Given that she has had the symptoms since last Thursday and had exam at that time without any concerning features she wishes to wait till tomorrow for follow-up with her ophthalmologist.  Pressures are normal no signs of acute angle-closure glaucoma.  Visual acuity is grossly intact.  We have given return precautions including worsening flashers, floaters, vision change etc.  Discussed close follow-up with primary doctor and ophthalmology.  At this point she wishes to discharge home and is feeling improved.    Disposition   The patient was discharged.     Diagnosis     ICD-10-CM    1. Visual disturbance  H53.9            Discharge Medications   Discharge Medication List as of 11/26/2024  6:07 PM            Scribe Disclosure:  I, Jacquelyn Forde, am serving as a scribe at 12:36 PM on 11/26/2024 to document services personally performed by Belia Hartman MD based on my observations and the provider's statements to me.        Belia Hartman MD  11/26/24 [id number]

## 2024-11-26 NOTE — PROGRESS NOTES
Interval Vascular Neurology Note    MRI brain and orbits appears normal; no findings to explain pt's presentation. Symptoms may represent hypertensive encephalopathy or migraine. No further stroke evaluation required; stroke service will sign off.   Patient should follow up with ophthalmology as planned. Lubricating eye drops as needed.     Yoanna Morales, NP

## 2024-11-27 NOTE — DISCHARGE INSTRUCTIONS
Call your eye doctor tomorrow for follow up.  Return if worsening floaters, flashers or curtain drop of vision.      Please have your PCP follow up with your in the next 2-3 days for repeat blood pressure check.  If you have chest pain, shortness of breath, increased swelling, difficulty doing your normal activities or other concerns return to the ED sooner.      Discharge Instructions  Hypertension - High Blood Pressure    During you visit to the Emergency Department, your blood pressure was higher than the recommended blood pressure.  This may be related to stress, pain, medication or other temporary conditions. In these cases, your blood pressure may return to normal on its own. If you have a history of high blood pressure, you may need to have your provider adjust your medications. Sometimes, your high measurement here may indicate that you have developed high blood pressure that will stay high unless it is treated. As a general rule, high blood pressure causes problems over years rather than days, weeks, or months. So, while it is important to treat blood pressure, it is rarely important to treat blood pressure immediately. Occasionally we will begin a medication in the Emergency Department; more often we will recommend close follow-up for medications with a primary doctor/clinic.    Generally, every Emergency Department visit should have a follow-up clinic visit with either a primary or a specialty clinic/provider. Please follow-up as instructed by your emergency provider today.    Return to the Emergency Department if you start to have:  A severe headache.  Chest pain.  Shortness of breath.  Weakness or numbness that affects one part of the body.  Confusion.  Vision changes.  Significant swelling of legs and/or eyes.  A reaction to any medication started in the Emergency Department.    What can I do to help myself?  Avoid alcohol.  Take any blood pressure medicine that you are prescribed.  Get a good night s  sleep.  Lower your salt intake.  Exercise.  Lose weight.  Manage stress.  See your doctor regularly    If blood pressure medication was started in the Emergency Department:  The medicine may not have an immediate effect. The body and brain determine what blood pressure you have. The medicine s job is to retrain the body s  thermostat  to a lower blood pressure.  You will need to follow up with your provider to see how this medicine is working for you.  If you were given a prescription for medicine here today, be sure to read all of the information (including the package insert) that comes with your prescription.  This will include important information about the medicine, its side effects, and any warnings that you need to know about.  The pharmacist who fills the prescription can provide more information and answer questions you may have about the medicine.  If you have questions or concerns that the pharmacist cannot address, please call or return to the Emergency Department.   Remember that you can always come back to the Emergency Department if you are not able to see your regular provider in the amount of time listed above, if you get any new symptoms, or if there is anything that worries you.

## 2024-12-04 ENCOUNTER — OFFICE VISIT (OUTPATIENT)
Dept: FAMILY MEDICINE | Facility: CLINIC | Age: 74
End: 2024-12-04

## 2024-12-04 VITALS
WEIGHT: 201 LBS | HEART RATE: 60 BPM | TEMPERATURE: 97.8 F | OXYGEN SATURATION: 97 % | DIASTOLIC BLOOD PRESSURE: 78 MMHG | BODY MASS INDEX: 33.97 KG/M2 | SYSTOLIC BLOOD PRESSURE: 138 MMHG

## 2024-12-04 DIAGNOSIS — J45.20 MILD INTERMITTENT ASTHMA WITHOUT COMPLICATION: ICD-10-CM

## 2024-12-04 DIAGNOSIS — J45.20 INTERMITTENT ASTHMA, UNSPECIFIED ASTHMA SEVERITY, UNSPECIFIED WHETHER COMPLICATED: ICD-10-CM

## 2024-12-04 DIAGNOSIS — I10 ESSENTIAL HYPERTENSION: ICD-10-CM

## 2024-12-04 DIAGNOSIS — F32.89 OTHER DEPRESSION: ICD-10-CM

## 2024-12-04 DIAGNOSIS — R05.1 ACUTE COUGH: ICD-10-CM

## 2024-12-04 DIAGNOSIS — B00.1 COLD SORE: ICD-10-CM

## 2024-12-04 DIAGNOSIS — F41.9 ANXIETY: ICD-10-CM

## 2024-12-04 PROBLEM — H43.392 VITREOUS SYNERESIS OF LEFT EYE: Status: ACTIVE | Noted: 2024-11-20

## 2024-12-04 LAB
ALBUMIN SERPL-MCNC: 4 G/DL (ref 3.6–5.1)
ALP SERPL-CCNC: 75 U/L (ref 33–130)
ALT 1742-6: 9 U/L (ref 0–32)
AST 1920-8: 11 U/L (ref 0–35)
BILIRUB SERPL-MCNC: 0.8 MG/DL (ref 0.2–1.2)
BUN SERPL-MCNC: 15 MG/DL (ref 7–25)
BUN/CREATININE RATIO: 20 (ref 6–32)
CALCIUM SERPL-MCNC: 9.3 MG/DL (ref 8.6–10.3)
CHLORIDE SERPLBLD-SCNC: 99.9 MMOL/L (ref 98–110)
CHOLEST SERPL-MCNC: 171 MG/DL (ref 0–199)
CHOLEST/HDLC SERPL: 3 {RATIO} (ref 0–5)
CO2 SERPL-SCNC: 30.1 MMOL/L (ref 20–32)
CREAT SERPL-MCNC: 0.74 MG/DL (ref 0.6–1.3)
GLUCOSE SERPL-MCNC: 98 MG/DL (ref 60–99)
HDLC SERPL-MCNC: 58 MG/DL (ref 40–150)
LDLC SERPL CALC-MCNC: 92 MG/DL (ref 0–129)
POTASSIUM SERPL-SCNC: 4.6 MMOL/L (ref 3.5–5.3)
PROT SERPL-MCNC: 6.9 G/DL (ref 6.1–8.1)
SODIUM SERPL-SCNC: 136.9 MMOL/L (ref 135–146)
TRIGL SERPL-MCNC: 104 MG/DL (ref 0–149)

## 2024-12-04 RX ORDER — ATENOLOL 50 MG/1
50 TABLET ORAL DAILY
Qty: 90 TABLET | Refills: 1 | Status: SHIPPED | OUTPATIENT
Start: 2024-12-04

## 2024-12-04 RX ORDER — BUSPIRONE HYDROCHLORIDE 15 MG/1
7.5 TABLET ORAL 2 TIMES DAILY
Qty: 90 TABLET | Refills: 1 | Status: SHIPPED | OUTPATIENT
Start: 2024-12-04

## 2024-12-04 RX ORDER — FLUTICASONE FUROATE AND VILANTEROL 100; 25 UG/1; UG/1
1 POWDER RESPIRATORY (INHALATION) DAILY
Qty: 180 EACH | Refills: 1 | Status: SHIPPED | OUTPATIENT
Start: 2024-12-04 | End: 2024-12-05

## 2024-12-04 RX ORDER — ALBUTEROL SULFATE 90 UG/1
2 INHALANT RESPIRATORY (INHALATION) EVERY 6 HOURS PRN
Qty: 54 G | Refills: 0 | Status: SHIPPED | OUTPATIENT
Start: 2024-12-04

## 2024-12-04 RX ORDER — POTASSIUM CHLORIDE 1500 MG/1
20 TABLET, EXTENDED RELEASE ORAL DAILY
Qty: 90 TABLET | Refills: 1 | Status: SHIPPED | OUTPATIENT
Start: 2024-12-04

## 2024-12-04 RX ORDER — HYDROCHLOROTHIAZIDE 25 MG/1
12.5 TABLET ORAL DAILY
Qty: 45 TABLET | Refills: 1 | Status: SHIPPED | OUTPATIENT
Start: 2024-12-04

## 2024-12-04 RX ORDER — BENZONATATE 100 MG/1
100 CAPSULE ORAL 3 TIMES DAILY PRN
Qty: 20 CAPSULE | Refills: 0 | Status: SHIPPED | OUTPATIENT
Start: 2024-12-04

## 2024-12-04 RX ORDER — CITALOPRAM HYDROBROMIDE 40 MG/1
40 TABLET ORAL DAILY
Qty: 90 TABLET | Refills: 1 | Status: SHIPPED | OUTPATIENT
Start: 2024-12-04

## 2024-12-04 ASSESSMENT — PATIENT HEALTH QUESTIONNAIRE - PHQ9
5. POOR APPETITE OR OVEREATING: SEVERAL DAYS
SUM OF ALL RESPONSES TO PHQ QUESTIONS 1-9: 3

## 2024-12-04 ASSESSMENT — ANXIETY QUESTIONNAIRES
5. BEING SO RESTLESS THAT IT IS HARD TO SIT STILL: NOT AT ALL
6. BECOMING EASILY ANNOYED OR IRRITABLE: NOT AT ALL
IF YOU CHECKED OFF ANY PROBLEMS ON THIS QUESTIONNAIRE, HOW DIFFICULT HAVE THESE PROBLEMS MADE IT FOR YOU TO DO YOUR WORK, TAKE CARE OF THINGS AT HOME, OR GET ALONG WITH OTHER PEOPLE: SOMEWHAT DIFFICULT
7. FEELING AFRAID AS IF SOMETHING AWFUL MIGHT HAPPEN: NOT AT ALL
GAD7 TOTAL SCORE: 4
3. WORRYING TOO MUCH ABOUT DIFFERENT THINGS: SEVERAL DAYS
GAD7 TOTAL SCORE: 4
1. FEELING NERVOUS, ANXIOUS, OR ON EDGE: SEVERAL DAYS
2. NOT BEING ABLE TO STOP OR CONTROL WORRYING: SEVERAL DAYS

## 2024-12-04 ASSESSMENT — ASTHMA QUESTIONNAIRES: ACT_TOTALSCORE: 21

## 2024-12-04 NOTE — LETTER
My Asthma Action Plan    Name: Ely Pollack   YOB: 1950  Date: 12/4/2024   My doctor: Noemi Kirkpatrick MD   My clinic: [facility]        My Rescue Medicine:   Albuterol inhaler (Proair/Ventolin/Proventil HFA)  2-4 puffs EVERY 4 HOURS as needed. Use a spacer if recommended by your provider.   My Asthma Severity:   Intermittent / Exercise Induced  Know your asthma triggers:                GREEN ZONE   Good Control  I feel good  No cough or wheeze  Can work, sleep and play without asthma symptoms       Take your asthma control medicine every day.     If exercise triggers your asthma, take your rescue medication  15 minutes before exercise or sports, and  During exercise if you have asthma symptoms  Spacer to use with inhaler: If you have a spacer, make sure to use it with your inhaler             YELLOW ZONE Getting Worse  I have ANY of these:  I do not feel good  Cough or wheeze  Chest feels tight  Wake up at night   Keep taking your Green Zone medications  Start taking your rescue medicine:  every 20 minutes for up to 1 hour. Then every 4 hours for 24-48 hours.  If you stay in the Yellow Zone for more than 12-24 hours, contact your doctor.  If you do not return to the Green Zone in 12-24 hours or you get worse, start taking your oral steroid medicine if prescribed by your provider.           RED ZONE Medical Alert - Get Help  I have ANY of these:  I feel awful  Medicine is not helping  Breathing getting harder  Trouble walking or talking  Nose opens wide to breathe       Take your rescue medicine NOW  If your provider has prescribed an oral steroid medicine, start taking it NOW  Call your doctor NOW  If you are still in the Red Zone after 20 minutes and you have not reached your doctor:  Take your rescue medicine again and  Call 911 or go to the emergency room right away    See your regular doctor within 2 weeks of an Emergency Room or Urgent Care visit for follow-up treatment.           Annual Reminders:  Meet with Asthma Educator,  Flu Shot in the Fall, consider Pneumonia Vaccination for patients with asthma (aged 19 and older).    Pharmacy: [facility] [id number] [place] [facility] [place] [address] [address]    Electronically signed by Noemi Kirkpatrick MD   Date: 12/04/24                    Asthma Triggers  How To Control Things That Make Your Asthma Worse    Triggers are things that make your asthma worse.  Look at the list below to help you find your triggers and   what you can do about them. You can help prevent asthma flare-ups by staying away from your triggers.      Trigger                                                          What you can do   Cigarette Smoke  Tobacco smoke can make asthma worse. Do not allow smoking in your home, car or around you.  Be sure no one smokes at a child s day care or school.  If you smoke, ask your health care provider for ways to help you quit.  Ask family members to quit too.  Ask your health care provider for a referral to Quit Plan to help you quit smoking, or call 0-913-581-PLAN.     Colds, Flu, Bronchitis  These are common triggers of asthma. Wash your hands often.  Don t touch your eyes, nose or mouth.  Get a flu shot every year.     Dust Mites  These are tiny bugs that live in cloth or carpet. They are too small to see. Wash sheets and blankets in hot water every week.   Encase pillows and mattress in dust mite proof covers.  Avoid having carpet if you can. If you have carpet, vacuum weekly.   Use a dust mask and HEPA vacuum.   Pollen and Outdoor Mold  Some people are allergic to trees, grass, or weed pollen, or molds. Try to keep your windows closed.  Limit time out doors when pollen count is high.   Ask you health care provider about taking medicine during allergy season.     Animal Dander  Some people are allergic to skin flakes, urine or saliva from pets with fur or feathers. Keep pets with fur or feathers out of your home.    If you can t keep the  pet outdoors, then keep the pet out of your bedroom.  Keep the bedroom door closed.  Keep pets off cloth furniture and away from stuffed toys.     Mice, Rats, and Cockroaches  Some people are allergic to the waste from these pests.   Cover food and garbage.  Clean up spills and food crumbs.  Store grease in the refrigerator.   Keep food out of the bedroom.   Indoor Mold  This can be a trigger if your home has high moisture. Fix leaking faucets, pipes, or other sources of water.   Clean moldy surfaces.  Dehumidify basement if it is damp and smelly.   Smoke, Strong Odors, and Sprays  These can reduce air quality. Stay away from strong odors and sprays, such as perfume, powder, hair spray, paints, smoke incense, paint, cleaning products, candles and new carpet.   Exercise or Sports  Some people with asthma have this trigger. Be active!  Ask your doctor about taking medicine before sports or exercise to prevent symptoms.    Warm up for 5-10 minutes before and after sports or exercise.     Other Triggers of Asthma  Cold air:  Cover your nose and mouth with a scarf.  Sometimes laughing or crying can be a trigger.  Some medicines and food can trigger asthma.

## 2024-12-04 NOTE — NURSING NOTE
Chief Complaint   Patient presents with    Recheck Medication     Fasting today, refill medications     Pre-visit Screening:  Immunizations:  up to date  Colonoscopy:  is up to date  Mammogram: is up to date  Asthma Action Test/Plan:  Done today  PHQ9:  Done today  GAD7:  Done today  Questioned patient about current smoking habits Pt. has never smoked.  Ok to leave detailed message on voice mail for today's visit only Yes, phone # [phone]

## 2024-12-26 ENCOUNTER — OFFICE VISIT (OUTPATIENT)
Dept: FAMILY MEDICINE | Facility: CLINIC | Age: 74
End: 2024-12-26

## 2024-12-26 VITALS
OXYGEN SATURATION: 97 % | TEMPERATURE: 97.9 F | SYSTOLIC BLOOD PRESSURE: 128 MMHG | DIASTOLIC BLOOD PRESSURE: 76 MMHG | HEART RATE: 63 BPM

## 2024-12-26 DIAGNOSIS — M54.50 ACUTE BILATERAL LOW BACK PAIN WITHOUT SCIATICA: Primary | ICD-10-CM

## 2024-12-26 RX ORDER — METHYLPREDNISOLONE 4 MG/1
TABLET ORAL
Qty: 21 TABLET | Refills: 0 | Status: SHIPPED | OUTPATIENT
Start: 2024-12-26

## 2024-12-26 NOTE — NURSING NOTE
Chief Complaint   Patient presents with    Pain     Pain in low back and hips, pain radiates down both legs and into both thighs, she has upcoming trip to [place]     Pre-visit Screening:  Immunizations:  up to date  Colonoscopy:  is up to date  Mammogram: is up to date  Asthma Action Test/Plan:  NA  PHQ9:  NA  GAD7:  NA  Questioned patient about current smoking habits Pt. has never smoked.  Ok to leave detailed message on voice mail for today's visit only Yes, phone # [phone]

## 2024-12-26 NOTE — PROGRESS NOTES
Assessment & Plan     Acute bilateral low back pain without sciatica-no red flags on exam, will trial anti-inflammatory treatment and await improvement  Seek emergency care or RTC if any leg weakness/numbness/tingling-concerns for possible early spinal stenosis  Minimize Aleve use, can add Tylenol  - methylPREDNISolone (MEDROL DOSEPAK) 4 MG tablet therapy pack  Dispense: 21 tablet; Refill: 0          Follow up as needed    No follow-ups on file.    Subjective   Evan is a 74 year old, presenting for the following health issues:  Pain (Pain in low back and hips, pain radiates down both legs and into both thighs, she has upcoming trip to [place])    HPI     Pt presents with low back pain for last 2 weeks. Started with lifting some boxes for [employer] decorations-ongoing since. Has seen chiropractor a few times, got hips aligned, helped for a few days but ongoing symptoms. Symptoms include anterior thigh pain and walking slightly bent forward-better with bending forward. Has a history of back issues in the past-this is more mild. No leg weakness that is unusual for her. No numbness, tingling in legs. No bowel/bladder dysfunction. Had T9-T10 fusion and decompression done 8 years ago.     Has a trip to [place] coming up-concerned how she will get through the flight. Leaving in 5 days.       Review of Systems  Constitutional, neuro, ENT, endocrine, pulmonary, cardiac, gastrointestinal, genitourinary, musculoskeletal, integument and psychiatric systems are negative, except as otherwise noted.      Objective    /76 (BP Location: Right arm, Patient Position: Sitting, Cuff Size: Adult Large)   Pulse 63   Temp 97.9  F (36.6  C) (Temporal)   SpO2 97%   There is no height or weight on file to calculate BMI.  Physical Exam   GENERAL: alert and no distress  NECK: no adenopathy, no asymmetry, masses, or scars  RESP: lungs clear to auscultation - no rales, rhonchi or wheezes  CV: regular rate and rhythm, normal S1 S2,  no S3 or S4, no murmur, click or rub, no peripheral edema  ABDOMEN: soft, nontender, no hepatosplenomegaly, no masses and bowel sounds normal  MS: no gross musculoskeletal defects noted, no edema  NEURO: Normal strength and tone, mentation intact and speech normal  Comprehensive back pain exam:  No tenderness, Lower extremity strength functional and equal on both sides, Lower extremity sensation normal and equal on both sides, and Straight leg raise negative bilaterally            Signed Electronically by: Ben Jewell PA-C

## 2025-01-21 ENCOUNTER — OFFICE VISIT (OUTPATIENT)
Dept: FAMILY MEDICINE | Facility: CLINIC | Age: 75
End: 2025-01-21

## 2025-01-21 VITALS
OXYGEN SATURATION: 96 % | SYSTOLIC BLOOD PRESSURE: 128 MMHG | HEART RATE: 77 BPM | WEIGHT: 207 LBS | TEMPERATURE: 97.9 F | BODY MASS INDEX: 34.98 KG/M2 | DIASTOLIC BLOOD PRESSURE: 82 MMHG

## 2025-01-21 DIAGNOSIS — U07.1 SARS-COV-2 POSITIVE: ICD-10-CM

## 2025-01-21 DIAGNOSIS — R05.8 OTHER SPECIFIED COUGH: Primary | ICD-10-CM

## 2025-01-21 LAB
COVID-19: POSITIVE
FLUAV AG UPPER RESP QL IA.RAPID: NORMAL
FLUBV AG UPPER RESP QL IA.RAPID: NORMAL

## 2025-01-21 PROCEDURE — 87804 INFLUENZA ASSAY W/OPTIC: CPT | Mod: 59 | Performed by: FAMILY MEDICINE

## 2025-01-21 PROCEDURE — G2211 COMPLEX E/M VISIT ADD ON: HCPCS | Performed by: FAMILY MEDICINE

## 2025-01-21 PROCEDURE — 87635 SARS-COV-2 COVID-19 AMP PRB: CPT | Performed by: FAMILY MEDICINE

## 2025-01-21 PROCEDURE — 99214 OFFICE O/P EST MOD 30 MIN: CPT | Performed by: FAMILY MEDICINE

## 2025-01-21 NOTE — PROGRESS NOTES
"Assessment & Plan   Problem List Items Addressed This Visit    None  Visit Diagnoses       Other specified cough    -  Primary    Relevant Orders    Influenza A and B ([facility])    COVID-19 ([facility])    SARS-CoV-2 positive        Relevant Medications    nirmatrelvir and ritonavir (PAXLOVID) 300 mg/100 mg therapy pack           1. Other specified cough (Primary)    - Influenza A and B ([facility])  - COVID-19 ([facility])    2. SARS-CoV-2 positive  Covid positive. Treat with paxlovid, normal kidney function. Discussed risks and benefits, she has taken this in the past.  - nirmatrelvir and ritonavir (PAXLOVID) 300 mg/100 mg therapy pack; Take 3 tablets by mouth 2 times daily for 5 days.  Dispense: 30 tablet; Refill: 0            BMI  Estimated body mass index is 34.98 kg/m  as calculated from the following:    Height as of 6/19/24: 1.638 m (5' 4.5\").    Weight as of this encounter: 93.9 kg (207 lb).         FUTURE APPOINTMENTS:       - Follow-up visit as needed. We manage her chronic medical care.    No follow-ups on file.    Noemi Kirkpatrick MD  [facility]    Subjective     Nursing Notes:   China Vasques CMA  1/21/2025 10:17 AM  Signed  Chief Complaint   Patient presents with    URI     Symptoms started Friday with cough and congestion, sinus headache, body aches, chills, SOB, wheezing      Pre-visit Screening:  Immunizations:  up to date  Colonoscopy:  is up to date  Mammogram: is up to date  Asthma Action Test/Plan:  NA  PHQ9:  NA  GAD7:  NA  Questioned patient about current smoking habits Pt. has never smoked.  Ok to leave detailed message on voice mail for today's visit only Yes, phone # [phone]       Ely Pollack is a 74 year old female who presents to clinic today for the following health issues HPI     Here with , similar sx. Was on a cruise, no headaches, no fevers. Sx started 3-4 days ago coughing phlegm. Sinus pressure. Breathing seems to be ok. Coughing at night. Otc medications are not " really helping. Taking robitussin otc. Took the cough perles, this seems to help.         Review of Systems   Constitutional, HEENT, cardiovascular, pulmonary, gi and gu systems are negative, except as otherwise noted.      Objective    /82 (BP Location: Right arm, Patient Position: Sitting, Cuff Size: Adult Large)   Pulse 77   Temp 97.9  F (36.6  C) (Temporal)   Wt 93.9 kg (207 lb)   SpO2 96%   BMI 34.98 kg/m    Body mass index is 34.98 kg/m .  Physical Exam   GENERAL: alert and no distress  RESP: lungs clear to auscultation - no rales, rhonchi or wheezes  CV: regular rate and rhythm, normal S1 S2, no S3 or S4, no murmur, click or rub, no peripheral edema  MS: no gross musculoskeletal defects noted, no edema  PSYCH: mentation appears normal, affect normal/bright    No results found for any visits on 01/21/25.

## 2025-01-21 NOTE — NURSING NOTE
Chief Complaint   Patient presents with    URI     Symptoms started Friday with cough and congestion, sinus headache, body aches, chills, SOB, wheezing      Pre-visit Screening:  Immunizations:  up to date  Colonoscopy:  is up to date  Mammogram: is up to date  Asthma Action Test/Plan:  NA  PHQ9:  NA  GAD7:  NA  Questioned patient about current smoking habits Pt. has never smoked.  Ok to leave detailed message on voice mail for today's visit only Yes, phone # [phone]

## 2025-02-15 DIAGNOSIS — J45.20 INTERMITTENT ASTHMA, UNSPECIFIED ASTHMA SEVERITY, UNSPECIFIED WHETHER COMPLICATED: ICD-10-CM

## 2025-02-17 RX ORDER — ALBUTEROL SULFATE 90 UG/1
2 INHALANT RESPIRATORY (INHALATION) EVERY 6 HOURS PRN
COMMUNITY
Start: 2025-02-17

## 2025-02-17 NOTE — TELEPHONE ENCOUNTER
Ely Pollack is requesting a refill of:    Refused Prescriptions:                       Disp   Refills    albuterol (PROAIR HFA/PROVENTIL HFA/VENTOL*                Sig: INHALE 2 PUFFS INTO THE LUNGS EVERY 6 HOURS AS NEEDED           FOR SHORTNESS OF BREATH OR WHEEZING.  Refused By: HECTOR QUIÑONEZ  Reason for Refusal: Patient has requested refill too soon    54g (3 inhalers) sent on 12/04/24

## 2025-04-23 DIAGNOSIS — F41.9 ANXIETY: ICD-10-CM

## 2025-04-23 DIAGNOSIS — I10 ESSENTIAL HYPERTENSION: ICD-10-CM

## 2025-04-23 DIAGNOSIS — F32.89 OTHER DEPRESSION: ICD-10-CM

## 2025-04-23 RX ORDER — BUSPIRONE HYDROCHLORIDE 15 MG/1
7.5 TABLET ORAL 2 TIMES DAILY
COMMUNITY
Start: 2025-04-23

## 2025-04-23 RX ORDER — ATENOLOL 50 MG/1
50 TABLET ORAL DAILY
COMMUNITY
Start: 2025-04-23

## 2025-04-23 RX ORDER — CITALOPRAM HYDROBROMIDE 40 MG/1
40 TABLET ORAL DAILY
COMMUNITY
Start: 2025-04-23

## 2025-04-23 NOTE — TELEPHONE ENCOUNTER
Ely Pollack is requesting a refill of:    Refused Prescriptions:                       Disp   Refills    citalopram (CELEXA) 40 MG tablet [Pharmacy*                Sig: TAKE 1 TABLET BY MOUTH EVERY DAY  Refused By: HECTOR QUIÑONEZ  Reason for Refusal: Patient has requested refill too soon    atenolol (TENORMIN) 50 MG tablet [Pharmacy*                Sig: TAKE 1 TABLET BY MOUTH EVERY DAY  Refused By: HECTOR QUIÑONEZ  Reason for Refusal: Patient has requested refill too soon    busPIRone (BUSPAR) 15 MG tablet [Pharmacy *                Sig: TAKE 0.5 TABLETS (7.5 MG) BY MOUTH 2 TIMES DAILY.  Refused By: HECTOR QUIÑONEZ  Reason for Refusal: Patient has requested refill too soon    Refills sent on 12/04/24 for 6 months, enough medication until 06/25 when pt is due for OV

## 2025-05-03 ENCOUNTER — HEALTH MAINTENANCE LETTER (OUTPATIENT)
Age: 75
End: 2025-05-03

## 2025-05-19 ENCOUNTER — RESULTS FOLLOW-UP (OUTPATIENT)
Dept: FAMILY MEDICINE | Facility: CLINIC | Age: 75
End: 2025-05-19

## 2025-05-27 ENCOUNTER — RESULTS FOLLOW-UP (OUTPATIENT)
Dept: FAMILY MEDICINE | Facility: CLINIC | Age: 75
End: 2025-05-27

## 2025-07-05 ENCOUNTER — HEALTH MAINTENANCE LETTER (OUTPATIENT)
Age: 75
End: 2025-07-05

## 2025-08-19 ENCOUNTER — HOSPITAL ENCOUNTER (OUTPATIENT)
Dept: MAMMOGRAPHY | Facility: CLINIC | Age: 75
Discharge: HOME | End: 2025-08-19
Attending: FAMILY MEDICINE
Payer: COMMERCIAL

## 2025-08-19 PROCEDURE — 77063 BREAST TOMOSYNTHESIS BI: CPT

## (undated) DEVICE — SOLUTION WOUND CLEANSING 3/4OZ 10% PVP EA-L3011FB-50

## (undated) DEVICE — GLOVE BIOGEL PI SZ 8.0 40880

## (undated) DEVICE — BONE CEMENT MIXEVAC III HI VAC KIT  0206-015-000

## (undated) DEVICE — DRAPE IOBAN INCISE 23X17" 6650EZ

## (undated) DEVICE — IMM KNEE 24" 0814-2664

## (undated) DEVICE — ESU GROUND PAD UNIVERSAL W/O CORD

## (undated) DEVICE — DRSG TEGADERM 4X4 3/4" 1626

## (undated) DEVICE — STPL SKIN 35W 6.9MM  PXW35

## (undated) DEVICE — DRSG KERLIX FLUFFS X5

## (undated) DEVICE — LINEN TOWEL PACK X5 5464

## (undated) DEVICE — SYR 50ML LL W/O NDL 309653

## (undated) DEVICE — GLOVE BIOGEL PI MICRO INDICATOR UNDERGLOVE SZ 8.0 48980

## (undated) DEVICE — SOL WATER IRRIG 1000ML BOTTLE 2F7114

## (undated) DEVICE — SOL NACL 0.9% IRRIG 1000ML BOTTLE 2F7124

## (undated) DEVICE — DRSG GAUZE 4X4" 3033

## (undated) DEVICE — SU VICRYL 0 CT-1 27" J340H

## (undated) DEVICE — DRSG ADAPTIC 3X8" 6113

## (undated) DEVICE — PACK TOTAL KNEE SOP15TKFSD

## (undated) DEVICE — BONE CLEANING TIP INTERPULSE  0210-010-000

## (undated) DEVICE — BLADE CLIPPER 4412A

## (undated) DEVICE — SU ETHIBOND 1 CT-1 30" X425H

## (undated) DEVICE — DRAPE SHEET REV FOLD 3/4 9349

## (undated) DEVICE — STOCKINETTE COMPRESSION EDEMAWEAR LARGE 24X46IN B120L01

## (undated) DEVICE — DRAPE STERI U 1015

## (undated) DEVICE — CAST PADDING 6" UNSTERILE 9046

## (undated) DEVICE — PUMP UNIT NEGATIVE PRESSURE PREVENA PLUS PRE4010.S

## (undated) DEVICE — NDL SPINAL 18GA 3.5" 405184

## (undated) DEVICE — MANIFOLD NEPTUNE 4 PORT 700-20

## (undated) DEVICE — DRAPE STERI TOWEL LG 1010

## (undated) DEVICE — SYR 50ML CATH TIP W/O NDL 309620

## (undated) DEVICE — Device

## (undated) DEVICE — CAST PADDING 6" STERILE 9046S

## (undated) DEVICE — SUCTION IRR SYSTEM W/O TIP INTERPULSE HANDPIECE 0210-100-000

## (undated) DEVICE — SU VICRYL 2-0 CT-1 27" UND J259H

## (undated) DEVICE — SU PDO 1 STRATAFIX 36X36CM CTX TAPERPOINT SXPD2B405

## (undated) DEVICE — SOL BENZOIN 0.5OZ

## (undated) DEVICE — DRSG PREVENA NEGATIVE PRESSURE 20CM WND PRE1001US

## (undated) DEVICE — BLADE SAW RECIP STRK 70X12.5X1.2MM 0277-096-281

## (undated) DEVICE — BLADE SAW SAGITTAL STRK 18X90X1.19MM HD SYS 6 6118-119-090

## (undated) DEVICE — SU ETHICON STRATAFIX SPR PS 3-0 30X30CM SXMP2B412

## (undated) DEVICE — DRSG STERI STRIP 1/2X4" R1547

## (undated) RX ORDER — TOBRAMYCIN 1.2 G/30ML
INJECTION, POWDER, LYOPHILIZED, FOR SOLUTION INTRAVENOUS
Status: DISPENSED
Start: 2023-01-18

## (undated) RX ORDER — ONDANSETRON 2 MG/ML
INJECTION INTRAMUSCULAR; INTRAVENOUS
Status: DISPENSED
Start: 2023-01-18

## (undated) RX ORDER — DEXAMETHASONE SODIUM PHOSPHATE 4 MG/ML
INJECTION, SOLUTION INTRA-ARTICULAR; INTRALESIONAL; INTRAMUSCULAR; INTRAVENOUS; SOFT TISSUE
Status: DISPENSED
Start: 2023-01-18

## (undated) RX ORDER — HYDROMORPHONE HYDROCHLORIDE 1 MG/ML
INJECTION, SOLUTION INTRAMUSCULAR; INTRAVENOUS; SUBCUTANEOUS
Status: DISPENSED
Start: 2023-01-18

## (undated) RX ORDER — NEOSTIGMINE METHYLSULFATE 1 MG/ML
VIAL (ML) INJECTION
Status: DISPENSED
Start: 2023-01-18

## (undated) RX ORDER — TRANEXAMIC ACID 650 MG/1
TABLET ORAL
Status: DISPENSED
Start: 2023-01-18

## (undated) RX ORDER — PROPOFOL 10 MG/ML
INJECTION, EMULSION INTRAVENOUS
Status: DISPENSED
Start: 2023-01-18

## (undated) RX ORDER — GLYCOPYRROLATE 0.2 MG/ML
INJECTION, SOLUTION INTRAMUSCULAR; INTRAVENOUS
Status: DISPENSED
Start: 2023-01-18

## (undated) RX ORDER — FENTANYL CITRATE 50 UG/ML
INJECTION, SOLUTION INTRAMUSCULAR; INTRAVENOUS
Status: DISPENSED
Start: 2023-01-18

## (undated) RX ORDER — ACETAMINOPHEN 325 MG/1
TABLET ORAL
Status: DISPENSED
Start: 2023-01-18

## (undated) RX ORDER — VANCOMYCIN HYDROCHLORIDE 1 G/20ML
INJECTION, POWDER, LYOPHILIZED, FOR SOLUTION INTRAVENOUS
Status: DISPENSED
Start: 2023-01-18